# Patient Record
Sex: FEMALE | Race: WHITE | NOT HISPANIC OR LATINO | Employment: PART TIME | URBAN - NONMETROPOLITAN AREA
[De-identification: names, ages, dates, MRNs, and addresses within clinical notes are randomized per-mention and may not be internally consistent; named-entity substitution may affect disease eponyms.]

---

## 2022-12-01 ENCOUNTER — OFFICE VISIT (OUTPATIENT)
Dept: FAMILY MEDICINE CLINIC | Age: 27
End: 2022-12-01

## 2022-12-01 VITALS
HEIGHT: 62 IN | WEIGHT: 183 LBS | HEART RATE: 57 BPM | BODY MASS INDEX: 33.68 KG/M2 | OXYGEN SATURATION: 100 % | SYSTOLIC BLOOD PRESSURE: 112 MMHG | DIASTOLIC BLOOD PRESSURE: 78 MMHG

## 2022-12-01 DIAGNOSIS — R19.7 DIARRHEA, UNSPECIFIED TYPE: Primary | ICD-10-CM

## 2022-12-01 DIAGNOSIS — Z13.29 THYROID DISORDER SCREEN: ICD-10-CM

## 2022-12-01 DIAGNOSIS — R41.840 CONCENTRATION DEFICIT: ICD-10-CM

## 2022-12-01 PROCEDURE — 99203 OFFICE O/P NEW LOW 30 MIN: CPT | Performed by: NURSE PRACTITIONER

## 2022-12-01 NOTE — PROGRESS NOTES
Chief Complaint  Establish Care (Last PCP in [place] in 2019 states she moved here 2020/2021)    Subjective          Letty Glez presents to [facility]     Patient is a 27-year-old female who is here today as a new patient.  Is having increasing concerns with focus and attention and possibly some hyperactivity.  She is often told she interrupts people when they are talking.  She finds it hard to complete task and finish sentences at times.  Did have some concerns while in school but not to the extent with which she finds as she is working.  She is never been formally tested for attention deficit disorders.  She would like to be tested.  She did see a therapist in 2019 after her mother passed away.  She was prescribed buspirone and hydroxyzine but they made her feel like she was in a fog and she did not take them longer than 1 month.  She does not feel as if she has anxiety or depression.  Denies any fatigue and states she is never really required much sleep.  Last menstrual cycle was 2 weeks ago.    Has had concerns with intermittent diarrhea and rectal bleeding since before 2019.  She lived in [place] at that time and had a colonoscopy 2 weeks after treatment with Levaquin for possible acute colitis.  They said she had some thickening but no other abnormalities on the colonoscopy.  She was told it could have been due to the recent treatment for colitis.  She has not been able to pinpoint any particular triggers and she denies any associated nausea or vomiting.  She sometimes will get some right mid to upper side pain but denies any pain radiating to her back or shoulder.  She does have her gallbladder.  Her stool sometimes looks oily or has mucus in it.  She denies any fever at any time.  She will not have insurance until after January 1.  She never did follow-up with gastroenterology in [place].     Objective   Vital Signs:   Vitals:    12/01/22 1412   BP: 112/78   BP  "Location: Left arm   Patient Position: Sitting   Cuff Size: Adult   Pulse: 57   SpO2: 100%   Weight: 83 kg (183 lb)   Height: 157.5 cm (62\")      Body mass index is 33.47 kg/m².  Physical Exam  Vitals reviewed.   Constitutional:       General: She is not in acute distress.     Appearance: Normal appearance. She is well-developed. She is obese.   Neck:      Thyroid: No thyroid mass, thyromegaly or thyroid tenderness.   Cardiovascular:      Rate and Rhythm: Normal rate and regular rhythm.      Heart sounds: Normal heart sounds.   Pulmonary:      Effort: Pulmonary effort is normal.      Breath sounds: Normal breath sounds.   Abdominal:      General: Abdomen is flat. Bowel sounds are normal. There is no distension.      Palpations: Abdomen is soft.      Tenderness: There is no abdominal tenderness. There is no guarding or rebound.   Musculoskeletal:      Right lower leg: No edema.      Left lower leg: No edema.   Skin:     General: Skin is warm and dry.   Neurological:      General: No focal deficit present.      Mental Status: She is alert.   Psychiatric:         Attention and Perception: Attention normal.         Mood and Affect: Mood and affect normal.         Behavior: Behavior normal.           Current Outpatient Medications:   •  ALBUTEROL IN, , Disp: , Rfl:        Result Review :                Assessment and Plan    Diagnoses and all orders for this visit:    1. Diarrhea, unspecified type (Primary)  Assessment & Plan:  She is to go to the emergency room if any worsening or acute nature to her symptoms.  She reports the symptoms are all chronic and she wants to start testing after January 1 and once her insurance becomes active.  We discussed possible irritable bowel symptoms and likely need to follow-up with a gastroenterologist in this area.  Patient is agreeable.    Orders:  -     CBC w AUTO Differential; Future  -     Comprehensive metabolic panel; Future  -     Amylase; Future  -     Lipase; Future  -     " Food Allergy Profile; Future  -     Clostridioides difficile Toxin, PCR - Stool, Per Rectum; Future  -     Enteric Bacterial Panel - Stool, Per Rectum; Future    2. Concentration deficit  Assessment & Plan:  I am not trained to diagnose attention deficit disorder.  I will be happy to refer her for further direction and testing.  I do discussed common treatments of attention deficit disorder could include Wellbutrin which could also manage anxiety or depression.  There is also a nonstimulant option for attention deficit disorder in adults.  There is commonly a lot of overlap between attention deficit and difficulty focusing and anxiety and depression.  She is agreeable to see someone for evaluation after January 1.  She does not want to pursue medication treatment at this time.  We also discussed behavioral modification and time management skills.    Orders:  -     Ambulatory Referral to Psychiatry; Future    3. Thyroid disorder screen  -     TSH; Future      Follow Up    Return in about 6 weeks (around 1/12/2023).  Patient was given instructions and counseling regarding her condition or for health maintenance advice. Please see specific information pulled into the AVS if appropriate.

## 2022-12-01 NOTE — ASSESSMENT & PLAN NOTE
I am not trained to diagnose attention deficit disorder.  I will be happy to refer her for further direction and testing.  I do discussed common treatments of attention deficit disorder could include Wellbutrin which could also manage anxiety or depression.  There is also a nonstimulant option for attention deficit disorder in adults.  There is commonly a lot of overlap between attention deficit and difficulty focusing and anxiety and depression.  She is agreeable to see someone for evaluation after January 1.  She does not want to pursue medication treatment at this time.  We also discussed behavioral modification and time management skills.

## 2022-12-01 NOTE — ASSESSMENT & PLAN NOTE
She is to go to the emergency room if any worsening or acute nature to her symptoms.  She reports the symptoms are all chronic and she wants to start testing after January 1 and once her insurance becomes active.  We discussed possible irritable bowel symptoms and likely need to follow-up with a gastroenterologist in this area.  Patient is agreeable.

## 2023-01-04 ENCOUNTER — LAB (OUTPATIENT)
Dept: LAB | Facility: HOSPITAL | Age: 28
End: 2023-01-04
Payer: COMMERCIAL

## 2023-01-04 DIAGNOSIS — Z13.29 THYROID DISORDER SCREEN: ICD-10-CM

## 2023-01-04 DIAGNOSIS — R19.7 DIARRHEA, UNSPECIFIED TYPE: ICD-10-CM

## 2023-01-04 LAB
BASOPHILS # BLD AUTO: 0.01 10*3/MM3 (ref 0–0.2)
BASOPHILS NFR BLD AUTO: 0.2 % (ref 0–1.5)
DEPRECATED RDW RBC AUTO: 37.5 FL (ref 37–54)
EOSINOPHIL # BLD AUTO: 0.09 10*3/MM3 (ref 0–0.4)
EOSINOPHIL NFR BLD AUTO: 1.5 % (ref 0.3–6.2)
ERYTHROCYTE [DISTWIDTH] IN BLOOD BY AUTOMATED COUNT: 11.7 % (ref 12.3–15.4)
HCT VFR BLD AUTO: 40.9 % (ref 34–46.6)
HGB BLD-MCNC: 14.1 G/DL (ref 12–15.9)
IMM GRANULOCYTES # BLD AUTO: 0.01 10*3/MM3 (ref 0–0.05)
IMM GRANULOCYTES NFR BLD AUTO: 0.2 % (ref 0–0.5)
LYMPHOCYTES # BLD AUTO: 1.84 10*3/MM3 (ref 0.7–3.1)
LYMPHOCYTES NFR BLD AUTO: 31.4 % (ref 19.6–45.3)
MCH RBC QN AUTO: 30.3 PG (ref 26.6–33)
MCHC RBC AUTO-ENTMCNC: 34.5 G/DL (ref 31.5–35.7)
MCV RBC AUTO: 87.8 FL (ref 79–97)
MONOCYTES # BLD AUTO: 0.27 10*3/MM3 (ref 0.1–0.9)
MONOCYTES NFR BLD AUTO: 4.6 % (ref 5–12)
NEUTROPHILS NFR BLD AUTO: 3.64 10*3/MM3 (ref 1.7–7)
NEUTROPHILS NFR BLD AUTO: 62.1 % (ref 42.7–76)
PLATELET # BLD AUTO: 227 10*3/MM3 (ref 140–450)
PMV BLD AUTO: 9 FL (ref 6–12)
RBC # BLD AUTO: 4.66 10*6/MM3 (ref 3.77–5.28)
WBC NRBC COR # BLD: 5.86 10*3/MM3 (ref 3.4–10.8)

## 2023-01-04 PROCEDURE — 82150 ASSAY OF AMYLASE: CPT

## 2023-01-04 PROCEDURE — 83690 ASSAY OF LIPASE: CPT

## 2023-01-04 PROCEDURE — 86003 ALLG SPEC IGE CRUDE XTRC EA: CPT

## 2023-01-04 PROCEDURE — 36415 COLL VENOUS BLD VENIPUNCTURE: CPT

## 2023-01-04 PROCEDURE — 80050 GENERAL HEALTH PANEL: CPT

## 2023-01-05 LAB
ALBUMIN SERPL-MCNC: 4.3 G/DL (ref 3.5–5.2)
ALBUMIN/GLOB SERPL: 1.9 G/DL
ALP SERPL-CCNC: 60 U/L (ref 39–117)
ALT SERPL W P-5'-P-CCNC: 46 U/L (ref 1–33)
AMYLASE SERPL-CCNC: 44 U/L (ref 28–100)
ANION GAP SERPL CALCULATED.3IONS-SCNC: 10.9 MMOL/L (ref 5–15)
AST SERPL-CCNC: 31 U/L (ref 1–32)
BILIRUB SERPL-MCNC: 0.3 MG/DL (ref 0–1.2)
BUN SERPL-MCNC: 13 MG/DL (ref 6–20)
BUN/CREAT SERPL: 17.6 (ref 7–25)
CALCIUM SPEC-SCNC: 9.4 MG/DL (ref 8.6–10.5)
CHLORIDE SERPL-SCNC: 102 MMOL/L (ref 98–107)
CO2 SERPL-SCNC: 25.1 MMOL/L (ref 22–29)
CREAT SERPL-MCNC: 0.74 MG/DL (ref 0.57–1)
EGFRCR SERPLBLD CKD-EPI 2021: 113.2 ML/MIN/1.73
GLOBULIN UR ELPH-MCNC: 2.3 GM/DL
GLUCOSE SERPL-MCNC: 86 MG/DL (ref 65–99)
LIPASE SERPL-CCNC: 41 U/L (ref 13–60)
POTASSIUM SERPL-SCNC: 3.9 MMOL/L (ref 3.5–5.2)
PROT SERPL-MCNC: 6.6 G/DL (ref 6–8.5)
SODIUM SERPL-SCNC: 138 MMOL/L (ref 136–145)
TSH SERPL DL<=0.05 MIU/L-ACNC: 1.07 UIU/ML (ref 0.27–4.2)

## 2023-01-06 NOTE — PROGRESS NOTES
Blood sugar, kidney and thyroid function are normal.  AST, ALT and bilirubin tell us about liver function.ALT is mildly elevated while AST and bilirubin are normal.  This could be due to many reasons, including regular use of ibuprofen, aleve, alcohol, high fat diet or fatty liver.  I would be more concerned if all liver function indicators were elevated, which they are not.  You are not anemic.  Pancreatic enzymes are normal.  Food allergy profile is not back yet.

## 2023-01-08 LAB
CLAM IGE QN: <0.1 KU/L
CODFISH IGE QN: <0.1 KU/L
CONV CLASS DESCRIPTION: NORMAL
CORN IGE QN: <0.1 KU/L
COW MILK IGE QN: <0.1 KU/L
EGG WHITE IGE QN: <0.1 KU/L
PEANUT IGE QN: <0.1 KU/L
SCALLOP IGE QN: <0.1 KU/L
SESAME SEED IGE QN: <0.1 KU/L
SHRIMP IGE QN: <0.1 KU/L
SOYBEAN IGE QN: <0.1 KU/L
WALNUT IGE QN: <0.1 KU/L
WHEAT IGE QN: <0.1 KU/L

## 2023-01-09 ENCOUNTER — OFFICE VISIT (OUTPATIENT)
Dept: BEHAVIORAL HEALTH | Facility: CLINIC | Age: 28
End: 2023-01-09
Payer: COMMERCIAL

## 2023-01-09 ENCOUNTER — TELEPHONE (OUTPATIENT)
Dept: FAMILY MEDICINE CLINIC | Age: 28
End: 2023-01-09
Payer: COMMERCIAL

## 2023-01-09 VITALS
SYSTOLIC BLOOD PRESSURE: 110 MMHG | HEART RATE: 62 BPM | BODY MASS INDEX: 33.05 KG/M2 | OXYGEN SATURATION: 96 % | DIASTOLIC BLOOD PRESSURE: 70 MMHG | HEIGHT: 62 IN | WEIGHT: 179.6 LBS

## 2023-01-09 DIAGNOSIS — F41.1 GENERALIZED ANXIETY DISORDER: Primary | ICD-10-CM

## 2023-01-09 DIAGNOSIS — R41.840 CONCENTRATION DEFICIT: ICD-10-CM

## 2023-01-09 PROCEDURE — 90792 PSYCH DIAG EVAL W/MED SRVCS: CPT

## 2023-01-09 NOTE — PROGRESS NOTES
[facility]  Initial Psychiatric Evaluation    Referring Provider:   Thank you   Olesya Mcmahon, APRN  [address] [facility]  [address]  [place] [address]  Your referral is greatly appreciated.    Vital Signs:   /70   Pulse 62   Ht 157.5 cm (62\")   Wt 81.5 kg (179 lb 9.6 oz)   SpO2 96%   BMI 32.85 kg/m²      Chief Complaint: ADHD.  Anxiety.    History of Present Illness:   Letty Glez is a 27 y.o. female who presents today for initial evaluation  For ADHD, and anxiety. Patient was referred to me for issues with focus and concentration, with hyperactivity. She has a compelling childhood ADHD assessment however was never referred or officially tested. She also has a compelling adult presentation of ADHD symptoms. Denies depressed mood, does have symptoms of anxiety.  Denies SI/HI/AVH. PHQ-9, 5 YOLA-7, 5 and the PHQ-9 is congruent with assessment and presentation, however the YOLA-7 presents as somewhat incongruent with her anxiety symptoms.      Generalized Anxiety Disorder (YOLA) 300.02 (Prototypical Worrier, incapable of relaxing)    EXCESSIVE WORRY and anxiety, occurring more days than not for at least 6 months  Are you a worrier?  Yes  What do you worry about?  \"I worry about every little thing \"  Do you ever feel jittery?  Yes  Do you tire easily?  Yes  Difficulty controlling the worry?  Yes    Anxiety associated with at LEAST 3 of the following    Muscle tension: Yes  Fatigue: Yes  Concentration difficulty: Decreased concentration  Restlessness or feeling on edge: Restlessness  Irritability: Denies  Sleep disturbance: Yes    Major depressive disorder at least 5 or more of the following symptoms have been present during the same 2-week period  Anhedonia: Denies  Guilt or hopelessness: Denies  Energy: Decreased energy  Concentration: Decreased concentration  Weight loss or weight gain: Denies  Psychomotor retardation or agitation: Denies  Irritability: Denies  Appetite:  Denies  Insomnia: Yes  Recurrent thoughts of death: Denies  Suicidal ideation: Denies  Duration: Denies  ADHD:  With focus on K5 through 6th grade only   Grades: Grades were \"okay \"  Behavioral issues:Always in trouble for talking  Special Classes:Math  Inattention:Rizwan,   Referral for ADHD testing: Did not have testing when she was younger, however would be interested in getting tested.     Often fails to give close attention to details or makes careless mistakes in schoolwork, at work, or during other activities: Yes  Often has difficulty sustaining attention in tasks: Yes  Often does not seem to listen when spoken to directly: Denies  Often does not follow through on instructions and fails to finish duties in the workplace: Denies  Often has difficulty organizing tasks and activities: Yes  Often avoids, dislikes or is reluctant to engage in tasks that require sustained mental effort: Yes  Often loses things necessary for tasks or activities: Yes  Is often easily distracted by extraneous stimuli: Yes   Is often forgetful in daily activities: Yes, all the time  Hyperactivity and Impulsivity: Yes  Often fidgets with or taps hands or feet: yes  Often leaves seat in situations when remaining seated is expected: Yes  Often feels restless: Yes  Is often “on the go”, acting as if “driven by a motor”: Yes  Often talks excessively: Yes  Often blurts out an answer before a question has been completed: Yes  Often has difficulty waiting their turn: Yes  Often interrupts or intrudes on others: Yes    PHQ-9 Depression Screening  PHQ-9 Total Score: 5    Little interest or pleasure in doing things? 0-->not at all   Feeling down, depressed, or hopeless? 0-->not at all   Trouble falling or staying asleep, or sleeping too much? 0-->not at all   Feeling tired or having little energy? 2-->more than half the days   Poor appetite or overeating? 0-->not at all   Feeling bad about yourself - or that you are a failure or have let  yourself or your family down? 0-->not at all   Trouble concentrating on things, such as reading the newspaper or watching television? 3-->nearly every day   Moving or speaking so slowly that other people could have noticed? Or the opposite - being so fidgety or restless that you have been moving around a lot more than usual? 0-->not at all   Thoughts that you would be better off dead, or of hurting yourself in some way? 0-->not at all   PHQ-9 Total Score 5     YOLA-7  Feeling nervous, anxious or on edge: Not at all  Not being able to stop or control worrying: Several days  Worrying too much about different things: Several days  Trouble Relaxing: Several days  Being so restless that it is hard to sit still: Several days  Feeling afraid as if something awful might happen: Not at all  Becoming easily annoyed or irritable: Several days  YOLA 7 Total Score: 5  If you checked any problems, how difficult have these problems made it for you to do your work, take care of things at home, or get along with other people: Somewhat difficult  Record Review for 01/09/2023 : I have thoroughly reviewed the patient's electronic medical record to include previous encounters, care everywhere, notes, medications, labs, EDU and UDS (if applicable), imaging, and EKG's.  Pertinent information is included in this note.  TSH and CBC are both reassuring and within normal limits, ALT is elevated at 46, however remaining CMP is reassuring, no other labs, imaging, procedures, EKGs in record.  No records for 6-month date range in EDU.  Per Referring Provider  12/1/2022 Patient is a 27-year-old female who is here today as a new patient.  Is having increasing concerns with focus and attention and possibly some hyperactivity.  She is often told she interrupts people when they are talking.  She finds it hard to complete task and finish sentences at times.  Did have some concerns while in school but not to the extent with which she finds as she is  working.  She is never been formally tested for attention deficit disorders.  She would like to be tested.  She did see a therapist in 2019 after her mother passed away.  She was prescribed buspirone and hydroxyzine but they made her feel like she was in a fog and she did not take them longer than 1 month.  She does not feel as if she has anxiety or depression.  Denies any fatigue and states she is never really required much sleep.  Labs:  WBC   Date Value Ref Range Status   01/04/2023 5.86 3.40 - 10.80 10*3/mm3 Final     Platelets   Date Value Ref Range Status   01/04/2023 227 140 - 450 10*3/mm3 Final     Hemoglobin   Date Value Ref Range Status   01/04/2023 14.1 12.0 - 15.9 g/dL Final     Hematocrit   Date Value Ref Range Status   01/04/2023 40.9 34.0 - 46.6 % Final     Glucose   Date Value Ref Range Status   01/04/2023 86 65 - 99 mg/dL Final     Creatinine   Date Value Ref Range Status   01/04/2023 0.74 0.57 - 1.00 mg/dL Final     ALT (SGPT)   Date Value Ref Range Status   01/04/2023 46 (H) 1 - 33 U/L Final     AST (SGOT)   Date Value Ref Range Status   01/04/2023 31 1 - 32 U/L Final     BUN   Date Value Ref Range Status   01/04/2023 13 6 - 20 mg/dL Final     eGFR   Date Value Ref Range Status   01/04/2023 113.2 >60.0 mL/min/1.73 Final     Comment:     National Kidney Foundation and American Society of Nephrology (ASN) Task Force recommended calculation based on the Chronic Kidney Disease Epidemiology Collaboration (CKD-EPI) equation refit without adjustment for race.     TSH   Date Value Ref Range Status   01/04/2023 1.070 0.270 - 4.200 uIU/mL Final     Last Urine Toxicity    There is no flowsheet data to display.          Imaging Results:  No Images in the past 120 days found..  EKG Results:    Allergy:   Allergies   Allergen Reactions   • Mixed Feathers Other (See Comments)     Told as child to not take (CHICKEN FEATHERS)   • Codeine Nausea And Vomiting      Problem List:  Patient Active Problem List    Diagnosis   • Concentration deficit   • Diarrhea   • Thyroid disorder screen     Current Medications:   Current Outpatient Medications   Medication Sig Dispense Refill   • ALBUTEROL IN      • sodium chloride 0.9 % nebulizer solution 0.82 mL with albuterol (5 MG/ML) 0.5% nebulizer solution 0.9 mg Take 15 mg/hr by nebulization Continuous.       No current facility-administered medications for this visit.       Discontinued Medications:  There are no discontinued medications.    Past Surgical History:  Past Surgical History:   Procedure Laterality Date   • TONSILLECTOMY       Past Medical History:  History reviewed. No pertinent past medical history.  Social History:  Martial Status: Has a boyfriend  Employed: Works at [employer]  Kids: denies  [place]   History: Denies  Social History     Socioeconomic History   • Marital status: Single   Tobacco Use   • Smoking status: Never   • Smokeless tobacco: Never   Vaping Use   • Vaping Use: Never used   Substance and Sexual Activity   • Alcohol use: Never   • Drug use: Never   • Sexual activity: Yes     Partners: Male     Birth control/protection: None     Family History:   Suicide Attempts: Denies  Suicide Completions: Denies  Family History   Problem Relation Age of Onset   • Anxiety disorder Mother    • Drug abuse Father    • Alcohol abuse Father    • ADD / ADHD Father    • No Known Problems Sister    • No Known Problems Brother    • No Known Problems Maternal Aunt    • No Known Problems Paternal Aunt    • No Known Problems Maternal Uncle    • No Known Problems Paternal Uncle    • No Known Problems Maternal Grandfather    • Dementia Maternal Grandmother    • No Known Problems Paternal Grandfather    • No Known Problems Paternal Grandmother    • No Known Problems Cousin    • No Known Problems Other    • Bipolar disorder Neg Hx    • Depression Neg Hx    • OCD Neg Hx    • Paranoid behavior Neg Hx    • Schizophrenia Neg Hx    • Seizures Neg Hx    •  Self-Injurious Behavior  Neg Hx    • Suicide Attempts Neg Hx        Developmental History:   Born: [place]  Siblings: Only child  Childhood: \"chaotic\" \"loved\" \"fun\" single mom  High School: Graduate  College: [employer]    Past Psychiatric History:  Began Treatment: 2019  Diagnoses: Anxiety  Psychiatrist: Denies, Family dr ordered medicine for anxiety (Celexa) but she did not take it  Therapist: Denies  Admission History: Denies  Medication Trials: buspar, hydroxyzine  Self Harm: Denies  Suicide Attempts: Denies  Trauma: Denies    Substance Abuse History:   Types: Denies  Withdrawal Symptoms: Not applicable  Longest Period Sober: Not applicable  AA: Not applicable    Access to Firearms: Denies    Mental Status Exam:  Appearance: good eye contact, normal street clothes, groomed, sitting in chair   Behavior: pleasant and cooperative  Motor: no abnormal  Speech: normal rhythm, rate, volume, tone, not hyperverbal, not pressured, normal prosidy  Mood: \" Euthymic\"  Affect: Appropriate  Thought Content: negative suicidal ideations, negative homicidal ideations, negative obsessions  Perceptions: negative auditory hallucinations, negative visual hallucinations, negative delusions, negative paranoia  Thought Process: goal directed, linear  Insight/Judgement: fair/fair  Cognition: grossly intact  Attention: intact  Orientation: person, place, time and situation  Memory: intact    Review of Systems:  Constitutional: Denies fatigue, night sweats  Eyes: Denies double vision, blurred vision  HENT: Denies vertigo, recent head injury  Cardiovascular: Denies chest pain, irregular heartbeats  Respiratory: Denies productive cough, shortness of breath  Gastrointestinal: Denies nausea, vomiting  Genitourinary: Denies dysuria, urinary retention  Integument: Denies hair growth change, new skin lesions  Neurologic: Denies altered mental status, seizures  Musculoskeletal: Denies joint swelling, limitation of motion  Endocrine:  Denies cold intolerance, heat intolerance  Psychiatric: See mental status exam above  Allergic-immunologic: Denies frequent illnesses    Visit Diagnoses:    ICD-10-CM ICD-9-CM   1. Generalized anxiety disorder  F41.1 300.02   2. Concentration deficit  R41.840 799.51       Diagnoses and all orders for this visit:    1. Generalized anxiety disorder (Primary)    2. Concentration deficit  -     Ambulatory Referral to Psychiatry  -     Ambulatory Referral to Psychology       PLAN: I am referring her to Dr. Petty, PhD for testing to confirm diagnosis  I expect that she will have a positive diagnosis of ADHD  I would also expect that she will have a satisfactory UDS  Follow-up 1 month  Presentation seems most consistent with generalized anxiety disorder and ADHD DSM-5 criteria.  No medication orders at this time as patient does not wish to start medication and wishes to confirm the diagnosis with testing.  We discussed that treatment of her ADHD could potentially help eliminate her anxiety while increasing concentration and focus.  Will refer to Dr. Petty, PhD for testing to confirm diagnosis of ADHD.  I would expect that she will have a satisfactory UDS and sign a CSA should we consider controlled substances and treatment.  I extensively discussed this with the patient. Patient verbalized understanding and is agreeable to this plan.  Addressed all questions and concerns.  Continue psychotherapeutic modalities as indicated.    TREATMENT PLAN/GOALS: Goal for treatment includes confirmation of diagnosis of ADHD.  Continue supportive psychotherapy efforts and medications as indicated. Treatment and medication options discussed during today's visit. Patient acknowledged and verbally consented to continue with current treatment plan and was educated on the importance of compliance with treatment and follow-up appointments.    1. Safety: No acute safety concerns.   2. Therapy: We will continue therapy at future  visits.  3. Risk Assessment: Risk of self-harm acutely is low.  Risk factors include anxiety disorder, mood disorder, and recent psychosocial stressors (pandemic). Protective factors include no family history, denies access to guns/weapons, no present SI, no history of suicide attempts or self-harm in the past, minimal AODA, healthcare seeking, future orientation, willingness to engage in care.  Risk of self-harm chronically is also low, but could be further elevated in the event of treatment noncompliance and/or AODA.  4. Labs/studies: No labs/studies ordered at this time  5. Medications: No new medication orders at this encounter.  Medication Education:  No new medication orders at this encounter  Medication Issues:  EDU reviewed as expected.   Discussed medication options and treatment plan of prescribed medication as well as the risks, benefits, and side effects including potential falls, possible impaired driving and metabolic adversities among others. Patient is agreeable to call the office with any worsening of symptoms or onset of side effects. Patient is agreeable to call 911 or go to the nearest ER should he/she begin having SI/HI. No medication side effects or related complaints today.     Referral for neuropsychological testing for ADHD made to:  Mansoor Petty, Psychologist, MS, LPP  [address], [address]  [place] [id number] [phone]    6. Follow-up: Return in about 1 month (around 2/9/2023) for Recheck, Next scheduled follow up.       This document has been electronically signed by VALENTIN Momin  January 10, 2023 14:15 EST    Please note that portions of this note were completed with a voice recognition program.  Copied text in this note has been reviewed and is accurate as of 01/10/23

## 2023-01-09 NOTE — TELEPHONE ENCOUNTER
Spoke with patient states she will complete stool labs this week  ( Clostridiides difficile toxin and Enteric Bacterial panel ). estela

## 2023-01-09 NOTE — TELEPHONE ENCOUNTER
----- Message from Dalia Fields sent at 1/9/2023  8:34 AM EST -----      ----- Message -----  From: SYSTEM  Sent: 1/8/2023   1:25 AM EST  To: [facility]

## 2023-01-09 NOTE — PATIENT INSTRUCTIONS
1.  Please return to clinic at your next scheduled visit.  Please contact the clinic ([phone]) at least 24 hours prior in the event you need to cancel.  2.  Do no harm to yourself or others.    3.  Avoid alcohol and drugs.    4.  Take all medications as prescribed.  Please contact the clinic with any concerns. If you are in need of medication refills, please call the clinic at [phone].    5. Should you want to get in touch with your provider, VALENTIN Momin, please contact the office ([phone]), and staff will be able to page Ginger directly.  6. In the event you have personal crisis, contact the following crisis numbers: Suicide Prevention Hotline 1-182.638.9589; [facility]NA Helpline 5-131-817-ZUAB; [facility] [phone]; text HELLO to 185579; or 488.     SPECIFIC RECOMMENDATIONS:     1.      Medications discussed at this encounter: No new medications ordered at this encounter                    2.      Psychotherapy recommendations: We will continue therapy at future visits     3.     Return to clinic:  1 month

## 2023-01-11 ENCOUNTER — TELEPHONE (OUTPATIENT)
Dept: PSYCHIATRY | Facility: CLINIC | Age: 28
End: 2023-01-11
Payer: COMMERCIAL

## 2023-01-11 NOTE — TELEPHONE ENCOUNTER
Patient called to report she reached out to Mansoor Petty as you asked her to do.  The first thing he has available is not until May or June.  Patient stated you wanted her to call you back and report.  Any more ideas with anyone else? Or book with him?  Please advise

## 2023-01-13 ENCOUNTER — PATIENT ROUNDING (BHMG ONLY) (OUTPATIENT)
Dept: PSYCHIATRY | Facility: CLINIC | Age: 28
End: 2023-01-13
Payer: COMMERCIAL

## 2023-01-13 NOTE — TELEPHONE ENCOUNTER
ATTEMPTED TO CONTACT PT PER PROVIDER'S INSTRUCTIONS      NO ANSWER      LEFT VOICEMAIL WITH INSTRUCTIONS TO RETURN CALL TO OFFICE AT [phone]

## 2023-01-13 NOTE — PROGRESS NOTES
January 13, 2023    Hello, may I speak with Letty Glez?    My name is KATHIE BARNES NRCMA/NRCPT    I am  with [facility]  [address]  [place] [id number] [phone].    Before we get started may I verify your date of birth? 1995    I am calling to officially welcome you to our practice and ask about your recent visit. Is this a good time to talk?   YES    Tell me about your visit with us. What things went well?  EVERYTHING WAS EXCEPTIONAL, IT FELT LIKE PROVIDER LISTEN TO HER CONCERNS AND DIDN'T RUSH HER VISIT      We're always looking for ways to make our patients' experiences even better. Do you have recommendations on ways we may improve?   SON, PROVIDER WAS KIND, COMPASSIONATE AND WONDERFUL    Overall were you satisfied with your first visit to our practice?   YES      I appreciate you taking the time to speak with me today. Is there anything else I can do for you?  SON    Thank you, and have a great day.

## 2023-01-17 ENCOUNTER — OFFICE VISIT (OUTPATIENT)
Dept: FAMILY MEDICINE CLINIC | Age: 28
End: 2023-01-17
Payer: COMMERCIAL

## 2023-01-17 VITALS
WEIGHT: 177 LBS | SYSTOLIC BLOOD PRESSURE: 105 MMHG | HEART RATE: 56 BPM | BODY MASS INDEX: 32.57 KG/M2 | DIASTOLIC BLOOD PRESSURE: 69 MMHG | OXYGEN SATURATION: 100 % | HEIGHT: 62 IN

## 2023-01-17 DIAGNOSIS — R19.7 DIARRHEA, UNSPECIFIED TYPE: Primary | ICD-10-CM

## 2023-01-17 DIAGNOSIS — K58.0 IRRITABLE BOWEL SYNDROME WITH DIARRHEA: ICD-10-CM

## 2023-01-17 PROCEDURE — 99213 OFFICE O/P EST LOW 20 MIN: CPT | Performed by: NURSE PRACTITIONER

## 2023-01-17 RX ORDER — DICYCLOMINE HYDROCHLORIDE 10 MG/1
10 CAPSULE ORAL 3 TIMES DAILY PRN
Qty: 60 CAPSULE | Refills: 2 | Status: SHIPPED | OUTPATIENT
Start: 2023-01-17

## 2023-01-17 RX ORDER — CETIRIZINE HYDROCHLORIDE 10 MG/1
10 TABLET ORAL DAILY
COMMUNITY

## 2023-01-17 NOTE — ASSESSMENT & PLAN NOTE
Defers ultrasound of the gallbladder at this time.  She will let me know if she wants to pursue ultrasound of the gallbladder.  Talk about irritable bowel syndrome and recommend she implement a low FODMAP diet and we can try Bentyl as needed.  I will be happy to refer her to a local gastroenterologist for further evaluation.  An updated lower scope may be reasonable.  Follow-up for any persisting symptoms.

## 2023-01-17 NOTE — PROGRESS NOTES
"Chief Complaint  Concentration deficit (6 week follow up ) and Diarrhea    Subjective          Autumn Resauit presents to [facility]     Patient is a 27-year-old female who is here today to follow-up regarding episodic diarrhea.  She did not return stool sample because she does not feel as if there could be any bacterial cause due to chronicity of symptoms.  Last menstrual cycle was within the last couple weeks.  She is currently being evaluated for attention deficit disorder.  Recent labs are discussed with a negative food allergy profile.  Last saw gastroenterology in [place] and has had a lower scope that showed some scarring or inflammation in the right colon but this was shortly after receiving Levaquin for potential colitis.  She does have her gallbladder but denies any right upper quadrant pain that is associated with any pain radiating to her back or shoulder.     Objective   Vital Signs:   Vitals:    01/17/23 1454   BP: 105/69   BP Location: Left arm   Patient Position: Sitting   Cuff Size: Adult   Pulse: 56   SpO2: 100%   Weight: 80.3 kg (177 lb)   Height: 157.5 cm (62\")      Body mass index is 32.37 kg/m².  Physical Exam  Vitals reviewed.   Constitutional:       General: She is not in acute distress.     Appearance: Normal appearance. She is well-developed.   Cardiovascular:      Rate and Rhythm: Normal rate and regular rhythm.      Heart sounds: Normal heart sounds.   Pulmonary:      Effort: Pulmonary effort is normal.      Breath sounds: Normal breath sounds.   Musculoskeletal:      Right lower leg: No edema.      Left lower leg: No edema.   Skin:     General: Skin is warm and dry.   Neurological:      General: No focal deficit present.      Mental Status: She is alert.   Psychiatric:         Attention and Perception: Attention normal.         Mood and Affect: Mood and affect normal.         Behavior: Behavior normal.           Current Outpatient Medications:   •  " ALBUTEROL IN, , Disp: , Rfl:   •  cetirizine (zyrTEC) 10 MG tablet, Take 10 mg by mouth Daily., Disp: , Rfl:   •  sodium chloride 0.9 % nebulizer solution 0.82 mL with albuterol (5 MG/ML) 0.5% nebulizer solution 0.9 mg, Take 15 mg/hr by nebulization Continuous., Disp: , Rfl:   •  dicyclomine (BENTYL) 10 MG capsule, Take 1 capsule by mouth 3 (Three) Times a Day As Needed (loose stools/ IBS)., Disp: 60 capsule, Rfl: 2       Result Review :     CMP    CMP 1/4/23   Glucose 86   BUN 13   Creatinine 0.74   eGFR 113.2   Sodium 138   Potassium 3.9   Chloride 102   Calcium 9.4   Total Protein 6.6   Albumin 4.3   Globulin 2.3   Total Bilirubin 0.3   Alkaline Phosphatase 60   AST (SGOT) 31   ALT (SGPT) 46 (A)   Albumin/Globulin Ratio 1.9   BUN/Creatinine Ratio 17.6   Anion Gap 10.9   (A) Abnormal value       Comments are available for some flowsheets but are not being displayed.           CBC    CBC 1/4/23   WBC 5.86   RBC 4.66   Hemoglobin 14.1   Hematocrit 40.9   MCV 87.8   MCH 30.3   MCHC 34.5   RDW 11.7 (A)   Platelets 227   (A) Abnormal value            CBC w/diff    CBC w/Diff 1/4/23   WBC 5.86   RBC 4.66   Hemoglobin 14.1   Hematocrit 40.9   MCV 87.8   MCH 30.3   MCHC 34.5   RDW 11.7 (A)   Platelets 227   Neutrophil Rel % 62.1   Immature Granulocyte Rel % 0.2   Lymphocyte Rel % 31.4   Monocyte Rel % 4.6 (A)   Eosinophil Rel % 1.5   Basophil Rel % 0.2   (A) Abnormal value                TSH    TSH 1/4/23   TSH 1.070                        Assessment and Plan    Diagnoses and all orders for this visit:    1. Diarrhea, unspecified type (Primary)  Assessment & Plan:  Defers ultrasound of the gallbladder at this time.  She will let me know if she wants to pursue ultrasound of the gallbladder.  Talk about irritable bowel syndrome and recommend she implement a low FODMAP diet and we can try Bentyl as needed.  I will be happy to refer her to a local gastroenterologist for further evaluation.  An updated lower scope may be  reasonable.  Follow-up for any persisting symptoms.    Orders:  -     dicyclomine (BENTYL) 10 MG capsule; Take 1 capsule by mouth 3 (Three) Times a Day As Needed (loose stools/ IBS).  Dispense: 60 capsule; Refill: 2    2. Irritable bowel syndrome with diarrhea  -     dicyclomine (BENTYL) 10 MG capsule; Take 1 capsule by mouth 3 (Three) Times a Day As Needed (loose stools/ IBS).  Dispense: 60 capsule; Refill: 2      Follow Up    Return if symptoms worsen or fail to improve.  Patient was given instructions and counseling regarding her condition or for health maintenance advice. Please see specific information pulled into the AVS if appropriate.

## 2023-01-23 ENCOUNTER — OFFICE VISIT (OUTPATIENT)
Dept: BEHAVIORAL HEALTH | Facility: CLINIC | Age: 28
End: 2023-01-23
Payer: COMMERCIAL

## 2023-01-23 VITALS
HEIGHT: 62 IN | BODY MASS INDEX: 32.39 KG/M2 | DIASTOLIC BLOOD PRESSURE: 68 MMHG | SYSTOLIC BLOOD PRESSURE: 112 MMHG | WEIGHT: 176 LBS | OXYGEN SATURATION: 99 % | HEART RATE: 57 BPM

## 2023-01-23 DIAGNOSIS — F41.1 GENERALIZED ANXIETY DISORDER: Primary | ICD-10-CM

## 2023-01-23 DIAGNOSIS — F90.0 ATTENTION DEFICIT HYPERACTIVITY DISORDER (ADHD), PREDOMINANTLY INATTENTIVE TYPE: ICD-10-CM

## 2023-01-23 PROCEDURE — 99213 OFFICE O/P EST LOW 20 MIN: CPT

## 2023-01-23 PROCEDURE — 90833 PSYTX W PT W E/M 30 MIN: CPT

## 2023-01-23 RX ORDER — MULTIPLE VITAMINS W/ MINERALS TAB 9MG-400MCG
1 TAB ORAL DAILY
COMMUNITY

## 2023-01-23 NOTE — PATIENT INSTRUCTIONS
1.  Please return to clinic at your next scheduled visit.  Please contact the clinic ([phone]) at least 24 hours prior in the event you need to cancel.  2.  Do no harm to yourself or others.    3.  Avoid alcohol and drugs.    4.  Take all medications as prescribed.  Please contact the clinic with any concerns. If you are in need of medication refills, please call the clinic at [phone].    5. Should you want to get in touch with your provider, VALENTIN Momin, please contact the office ([phone]), and staff will be able to page Ginger directly.  6. In the event you have personal crisis, contact the following crisis numbers: Suicide Prevention Hotline 1-613.234.4879; [facility] Helpline 6-894-122-INKF; [facility] Emergency Room [phone]; text HELLO to [phone]; or 852.     SPECIFIC RECOMMENDATIONS:     1.      Medications discussed at this encounter: n/a                    2.      Psychotherapy recommendations: We will continue therapy at future visits.     3.     Return to clinic: 1 week

## 2023-01-23 NOTE — PROGRESS NOTES
"[facility]  Medication Management Follow-up    Referring Provider:  No referring provider defined for this encounter.    Vital Signs:   /68   Pulse 57   Ht 157.5 cm (62\")   Wt 79.8 kg (176 lb)   SpO2 99%   BMI 32.19 kg/m²    Visit Diagnoses:    ICD-10-CM ICD-9-CM   1. Generalized anxiety disorder  F41.1 300.02   2. Attention deficit hyperactivity disorder (ADHD), predominantly inattentive type  F90.0 314.00       Chief Complaint: \"I cannot get in for ADHD testing until the summer \"    History of Present Illness:   Letty Glez is a 27 y.o. female who presents today for follow up For ADHD, and anxiety. Patient is still having issues with focus and concentration, with hyperactivity. She has a compelling childhood ADHD assessment however was never referred or officially tested. She also has a compelling adult presentation of ADHD symptoms. Denies depressed mood, does have symptoms of anxiety.  Denies SI/HI/AVH. PHQ-9, 4 YOLA-7, 5 and the PHQ-9 is congruent with assessment and presentation, however the YOLA-7 presents as somewhat incongruent with her anxiety symptoms.  We discussed the possibility of starting Wellbutrin to target ADHD symptoms.  Patient has had extremely negative experience with medications in the past.  She is hesitant to begin a medication while also starting a new job and would like to wait to confirm ADHD diagnosis.    Record Review is below for 01/23/2023 : I have thoroughly reviewed the patient's electronic medical record to include previous encounters, care everywhere, notes, medications, labs, EDU and UDS (if applicable), imaging, and EKG's.  Pertinent information is included in this note.  TSH and CBC are both reassuring and within normal limits, ALT is elevated at 46, however remaining CMP is reassuring, no other labs, imaging, procedures, EKGs in record.  No records for 6-month date range in EDU.  1/9/2023 I am referring her to Dr. Petty, PhD for " testing to confirm diagnosis  I expect that she will have a positive diagnosis of ADHD  I would also expect that she will have a satisfactory UDS  Follow-up 1 month  Presentation seems most consistent with generalized anxiety disorder and ADHD DSM-5 criteria.  No medication orders at this time as patient does not wish to start medication and wishes to confirm the diagnosis with testing.  We discussed that treatment of her ADHD could potentially help eliminate her anxiety while increasing concentration and focus.  Will refer to Dr. Petty, PhD for testing to confirm diagnosis of ADHD.  I would expect that she will have a satisfactory UDS and sign a CSA should we consider controlled substances and treatment.  I extensively discussed this with the patient. Patient verbalized understanding and is agreeable to this plan.  Addressed all questions and concerns.  Continue psychotherapeutic modalities as indicated.  Psychiatric/Behavioral Health History  Began Treatment: 2019  Diagnoses: Anxiety  Psychiatrist: Denies, Family dr ordered medicine for anxiety (Celexa) but she did not take it  Therapist: Denies  Admission History: Denies  Medication Trials: buspar, hydroxyzine  Self Harm: Denies  Suicide Attempts: Denies  Trauma: Denies  Labs:  WBC   Date Value Ref Range Status   01/04/2023 5.86 3.40 - 10.80 10*3/mm3 Final     Platelets   Date Value Ref Range Status   01/04/2023 227 140 - 450 10*3/mm3 Final     Hemoglobin   Date Value Ref Range Status   01/04/2023 14.1 12.0 - 15.9 g/dL Final     Hematocrit   Date Value Ref Range Status   01/04/2023 40.9 34.0 - 46.6 % Final     Glucose   Date Value Ref Range Status   01/04/2023 86 65 - 99 mg/dL Final     Creatinine   Date Value Ref Range Status   01/04/2023 0.74 0.57 - 1.00 mg/dL Final     ALT (SGPT)   Date Value Ref Range Status   01/04/2023 46 (H) 1 - 33 U/L Final     AST (SGOT)   Date Value Ref Range Status   01/04/2023 31 1 - 32 U/L Final     BUN   Date Value Ref Range Status    01/04/2023 13 6 - 20 mg/dL Final     eGFR   Date Value Ref Range Status   01/04/2023 113.2 >60.0 mL/min/1.73 Final     Comment:     National Kidney Foundation and American Society of Nephrology (ASN) Task Force recommended calculation based on the Chronic Kidney Disease Epidemiology Collaboration (CKD-EPI) equation refit without adjustment for race.     TSH   Date Value Ref Range Status   01/04/2023 1.070 0.270 - 4.200 uIU/mL Final      Pain Management Panel    There is no flowsheet data to display.          Imaging Results:  No Images in the past 120 days found..  EKG Results: None in record      Current Medications:   Current Outpatient Medications   Medication Sig Dispense Refill   • ALBUTEROL IN      • cetirizine (zyrTEC) 10 MG tablet Take 10 mg by mouth Daily.     • dicyclomine (BENTYL) 10 MG capsule Take 1 capsule by mouth 3 (Three) Times a Day As Needed (loose stools/ IBS). 60 capsule 2   • multivitamin with minerals (HAIR/SKIN/NAILS/BIOTIN PO) Take 1 tablet by mouth Daily.     • sodium chloride 0.9 % nebulizer solution 0.82 mL with albuterol (5 MG/ML) 0.5% nebulizer solution 0.9 mg Take 15 mg/hr by nebulization Continuous.       No current facility-administered medications for this visit.       Problem List:  Patient Active Problem List   Diagnosis   • Concentration deficit   • Diarrhea   • Thyroid disorder screen   • Irritable bowel syndrome with diarrhea       Allergy:   Allergies   Allergen Reactions   • Mixed Feathers Other (See Comments)     Told as child to not take (CHICKEN FEATHERS)   • Codeine Nausea And Vomiting        Discontinued Medications:  There are no discontinued medications.    PHQ-9 Depression Screening  PHQ-9 Total Score: 4    Little interest or pleasure in doing things? 0-->not at all   Feeling down, depressed, or hopeless? 0-->not at all   Trouble falling or staying asleep, or sleeping too much? 0-->not at all   Feeling tired or having little energy? 1-->several days   Poor appetite  or overeating? 0-->not at all   Feeling bad about yourself - or that you are a failure or have let yourself or your family down? 0-->not at all   Trouble concentrating on things, such as reading the newspaper or watching television? 2-->more than half the days   Moving or speaking so slowly that other people could have noticed? Or the opposite - being so fidgety or restless that you have been moving around a lot more than usual? 1-->several days   Thoughts that you would be better off dead, or of hurting yourself in some way? 0-->not at all   PHQ-9 Total Score 4     YOLA-7  Feeling nervous, anxious or on edge: Several days  Not being able to stop or control worrying: Several days  Worrying too much about different things: Not at all  Trouble Relaxing: More than half the days  Being so restless that it is hard to sit still: Several days  Feeling afraid as if something awful might happen: Not at all  Becoming easily annoyed or irritable: Not at all  YOLA 7 Total Score: 5  If you checked any problems, how difficult have these problems made it for you to do your work, take care of things at home, or get along with other people: Somewhat difficult    Mental Status Exam:   Appearance: good eye contact, normal street clothes, groomed, sitting in chair   Behavior: pleasant and cooperative  Motor: no abnormal  Speech: normal rhythm, rate, volume, tone, not hyperverbal, not pressured, normal prosidy  Mood: Euthymic  Affect: Appropriate  Thought Content: negative suicidal ideations, negative homicidal ideations, negative obsessions  Perceptions: negative auditory hallucinations, negative visual hallucinations, negative delusions, negative paranoia  Thought Process: goal directed, linear  Insight/Judgement: fair/fair  Cognition: grossly intact  Attention: intact  Orientation: person, place, time and situation  Memory: intact    Review of Systems:   Constitutional: Denies fatigue, night sweats  Eyes: Denies double vision, blurred  vision  HENT: Denies vertigo, recent head injury  Cardiovascular: Denies chest pain, irregular heartbeats  Respiratory: Denies productive cough, shortness of breath  Gastrointestinal: Denies nausea, vomiting  Genitourinary: Denies dysuria, urinary retention  Integument: Denies hair growth change, new skin lesions  Neurologic: Denies altered mental status, seizures  Musculoskeletal: Denies joint swelling, limitation of motion  Endocrine: Denies cold intolerance, heat intolerance  Psychiatric: See mental status exam  Allergic-immunologic: Denies frequent illnesses    Psychotherapy:   Therapy   17 minutes of supportive psychotherapy with goal to strengthen defenses, promote problems solving, restore adaptive functioning and provide symptom relief. The therapeutic alliance was strengthened to encourage the patient to express their thoughts and feelings. Esteem building was enhanced through praise, reassurance, normalizing and encouragement. Coping skills were enhanced to build distress tolerance skills and emotional regulation. Allowed patient to freely discuss issues without interruption or judgement with unconditional positive regard, active listening skills, and empathy. Provided a safe, confidential environment to facilitate the development of a positive therapeutic relationship and encourage open, honest communication. Assisted patient in identifying risk factors which would indicate the need for higher level of care including thoughts to harm self or others and/or self-harming behavior and encouraged patient to contact this office, call 911, or present to the nearest emergency room should any of these events occur. Assisted patient in processing session content; acknowledged and normalized patient’s thoughts, feelings, and concerns by utilizing a person-centered approach in efforts to build appropriate rapport and a positive therapeutic relationship with open and honest communication. Patient given education on  medication side effects, diagnosis/illness and relapse symptoms. Plan to continue supportive psychotherapy in next appointment to provide symptom relief.    Struggles: Patient is still struggling with her ADHD symptoms, focus, concentration.  Patient is feeling like this is really affecting her life and her work.    Victories: Patient recently got a new job which would be increase in pay and better hours.      Diagnoses and all orders for this visit:    1. Generalized anxiety disorder (Primary)    2. Attention deficit hyperactivity disorder (ADHD), predominantly inattentive type         PLAN:   Presentation seems most consistent with anxiety and ADHD.  Will continue to wait for the phone call for her ADHD testing.  I extensively discussed some other options that we may be able to consider regarding treating her anxiety and ADHD. Patient verbalized understanding and is agreeable to this plan.  Addressed all questions and concerns.  Continue psychotherapeutic modalities as indicated.    TREATMENT PLAN/GOALS: Continue supportive psychotherapy efforts and medications as indicated. Treatment and medication options discussed during today's visit. Patient acknowledged and verbally consented to continue with current treatment plan and was educated on the importance of compliance with treatment and follow-up appointments.    1. Safety: No acute safety concerns.   2. Therapy: Declines  3. Risk Assessment: Risk of self-harm acutely is low to moderate.  Risk factors include anxiety disorder, mood disorder, and recent psychosocial stressors (pandemic). Protective factors include no family history, denies access to guns/weapons, no present SI, no history of suicide attempts or self-harm in the past, minimal AODA, healthcare seeking, future orientation, willingness to engage in care.  Risk of self-harm chronically is also low to moderate, but could be further elevated in the event of treatment noncompliance and/or  AODA.  4. Labs/studies: No labs/studies ordered at this time  5. Medications: No medications ordered for this encounter  Medication Education: No medications ordered for this encounter  Medication Issues:  EDU reviewed as expected.  Discussed medication options and treatment plan of prescribed medication as well as the risks, benefits, and side effects including potential falls, possible impaired driving and metabolic adversities among others. Patient is agreeable to call the office with any worsening of symptoms or onset of side effects. Patient is agreeable to call 911 or go to the nearest ER should he/she begin having SI/HI. No medication side effects or related complaints today.   6. Follow-up: Return in about 1 week (around 1/30/2023) for Next scheduled follow up.         This document has been electronically signed by VALENTIN Momin  January 23, 2023 18:16 EST    Please note that portions of this note were completed with a voice recognition program.  Copied text in this note has been reviewed and is accurate as of 01/23/23

## 2023-01-29 NOTE — PROGRESS NOTES
"[facility] Behavioral Health/Psychiatry  Medication Management Follow-up    Referring Provider:  No referring provider defined for this encounter.    Vital Signs:   /78   Pulse 53   Ht 157.5 cm (62\")   Wt 81.3 kg (179 lb 4.8 oz)   BMI 32.79 kg/m²    Visit Diagnoses:    ICD-10-CM ICD-9-CM   1. Generalized anxiety disorder  F41.1 300.02   2. Attention deficit hyperactivity disorder (ADHD), predominantly inattentive type  F90.0 314.00     Chief Complaint: ADHD.  Anxiety.    History of Present Illness:   Letty Glez is a 27 y.o. female who presents today for follow up for ADHD, and anxiety. Patient is still having issues with focus and concentration. She has a compelling childhood ADHD assessment however was never referred or officially tested. She also has a compelling adult presentation of ADHD symptoms.  We have been having difficulty finding an opening for neuropsychological testing. Denies depressed mood, does have symptoms of anxiety.  She states that this week has been even worse for her.  She is losing things, having irritability, and has also found that she sometimes purchases things that she already has because she forgot that she purchased them.  She is ultimately concerned about getting the symptoms under control because she is about to start a new job.  We may need to begin ADHD medication in order to not delay treatment any further for her.  We discussed the need for satisfactory urine drug screen and sign controlled substances agreement.  Denies SI/HI/AVH. PHQ-9, 4 YOLA-7, 4 and the PHQ-9 is congruent with assessment and presentation, however the YOLA-7 presents as somewhat incongruent with her anxiety symptoms.        Record Review is below for 01/30/2023 : I have thoroughly reviewed the patient's electronic medical record to include previous encounters, care everywhere, notes, medications, labs, EDU and UDS (if applicable), imaging, and EKG's.  Pertinent information is included in this " note.  TSH and CBC are both reassuring and within normal limits, ALT is elevated at 46, however remaining CMP is reassuring, no other labs, imaging, procedures, EKGs in record.  No records for 6-month date range in [facility].  1/23/2023 Letty Glez is a 27 y.o. female who presents today for follow up For ADHD, and anxiety. Patient is still having issues with focus and concentration, with hyperactivity. She has a compelling childhood ADHD assessment however was never referred or officially tested. She also has a compelling adult presentation of ADHD symptoms. Denies depressed mood, does have symptoms of anxiety.  Denies SI/HI/AVH. PHQ-9, 4 YOLA-7, 5 and the PHQ-9 is congruent with assessment and presentation, however the YOLA-7 presents as somewhat incongruent with her anxiety symptoms.  We discussed the possibility of starting Wellbutrin to target ADHD symptoms.  Patient has had extremely negative experience with medications in the past.  She is hesitant to begin a medication while also starting a new job and would like to wait to confirm ADHD diagnosis.    Record Review is below for 01/23/2023 : I have thoroughly reviewed the patient's electronic medical record to include previous encounters, care everywhere, notes, medications, labs, EDU and UDS (if applicable), imaging, and EKG's.  Pertinent information is included in this note.  TSH and CBC are both reassuring and within normal limits, ALT is elevated at 46, however remaining CMP is reassuring, no other labs, imaging, procedures, EKGs in record.  No records for 6-month date range in [facility].  1/9/2023 I am referring her to Dr. Petty, PhD for testing to confirm diagnosis  I expect that she will have a positive diagnosis of ADHD  I would also expect that she will have a satisfactory UDS  Follow-up 1 month  Presentation seems most consistent with generalized anxiety disorder and ADHD DSM-5 criteria.  No medication orders at this time as patient does not wish to  start medication and wishes to confirm the diagnosis with testing.  We discussed that treatment of her ADHD could potentially help eliminate her anxiety while increasing concentration and focus.  Will refer to Dr. Petty, PhD for testing to confirm diagnosis of ADHD.  I would expect that she will have a satisfactory UDS and sign a CSA should we consider controlled substances and treatment.  I extensively discussed this with the patient. Patient verbalized understanding and is agreeable to this plan.  Addressed all questions and concerns.  Continue psychotherapeutic modalities as indicated.    Psychiatric/Behavioral Health History  Began Treatment: 2019  Diagnoses: Anxiety  Psychiatrist: Denies, Family dr ordered medicine for anxiety (Celexa) but she did not take it  Therapist: Jason  Admission History: Denies  Medication Trials: buspar, hydroxyzine  Self Harm: Denies  Suicide Attempts: Denies  Trauma: Denies    Labs:  WBC   Date Value Ref Range Status   01/04/2023 5.86 3.40 - 10.80 10*3/mm3 Final     Platelets   Date Value Ref Range Status   01/04/2023 227 140 - 450 10*3/mm3 Final     Hemoglobin   Date Value Ref Range Status   01/04/2023 14.1 12.0 - 15.9 g/dL Final     Hematocrit   Date Value Ref Range Status   01/04/2023 40.9 34.0 - 46.6 % Final     Glucose   Date Value Ref Range Status   01/04/2023 86 65 - 99 mg/dL Final     Creatinine   Date Value Ref Range Status   01/04/2023 0.74 0.57 - 1.00 mg/dL Final     ALT (SGPT)   Date Value Ref Range Status   01/04/2023 46 (H) 1 - 33 U/L Final     AST (SGOT)   Date Value Ref Range Status   01/04/2023 31 1 - 32 U/L Final     BUN   Date Value Ref Range Status   01/04/2023 13 6 - 20 mg/dL Final     eGFR   Date Value Ref Range Status   01/04/2023 113.2 >60.0 mL/min/1.73 Final     Comment:     National Kidney Foundation and American Society of Nephrology (ASN) Task Force recommended calculation based on the Chronic Kidney Disease Epidemiology Collaboration (CKD-EPI) equation  refit without adjustment for race.     TSH   Date Value Ref Range Status   01/04/2023 1.070 0.270 - 4.200 uIU/mL Final      Pain Management Panel     Pain Management Panel Latest Ref Rng & Units 1/30/2023    AMPHETAMINES SCREEN, URINE Negative Negative    BARBITURATES SCREEN Negative Negative    BENZODIAZEPINE SCREEN, URINE Negative Negative    BUPRENORPHINEUR Negative Negative    COCAINE SCREEN, URINE Negative Negative    METHADONE SCREEN, URINE Negative Negative    METHAMPHETAMINEUR Negative Negative           Imaging Results:  No Images in the past 120 days found..  EKG Results: None in record      Current Medications:   Current Outpatient Medications   Medication Sig Dispense Refill   • ALBUTEROL IN      • cetirizine (zyrTEC) 10 MG tablet Take 10 mg by mouth Daily.     • dicyclomine (BENTYL) 10 MG capsule Take 1 capsule by mouth 3 (Three) Times a Day As Needed (loose stools/ IBS). 60 capsule 2   • multivitamin with minerals tablet tablet Take 1 tablet by mouth Daily.     • sodium chloride 0.9 % nebulizer solution 0.82 mL with albuterol (5 MG/ML) 0.5% nebulizer solution 0.9 mg Take 15 mg/hr by nebulization Continuous.       No current facility-administered medications for this visit.       Problem List:  Patient Active Problem List   Diagnosis   • Concentration deficit   • Diarrhea   • Thyroid disorder screen   • Irritable bowel syndrome with diarrhea       Allergy:   Allergies   Allergen Reactions   • Mixed Feathers Other (See Comments)     Told as child to not take (CHICKEN FEATHERS)   • Codeine Nausea And Vomiting   • Haemophilus Influenzae Vaccines Other (See Comments)     ALLERGIC TO CHICKEN FEATHERS AND CHICKEN BASED INGREDIENTS         Discontinued Medications:  There are no discontinued medications.    PHQ-9 Depression Screening  PHQ-9 Total Score: 4    Little interest or pleasure in doing things? 0-->not at all   Feeling down, depressed, or hopeless? 0-->not at all   Trouble falling or staying asleep, or  sleeping too much? 0-->not at all   Feeling tired or having little energy? 1-->several days   Poor appetite or overeating? 0-->not at all   Feeling bad about yourself - or that you are a failure or have let yourself or your family down? 0-->not at all   Trouble concentrating on things, such as reading the newspaper or watching television? 3-->nearly every day   Moving or speaking so slowly that other people could have noticed? Or the opposite - being so fidgety or restless that you have been moving around a lot more than usual? 0-->not at all   Thoughts that you would be better off dead, or of hurting yourself in some way? 0-->not at all   PHQ-9 Total Score 4     YOLA-7  Feeling nervous, anxious or on edge: Not at all  Not being able to stop or control worrying: Not at all  Worrying too much about different things: Not at all  Trouble Relaxing: Several days  Being so restless that it is hard to sit still: More than half the days  Feeling afraid as if something awful might happen: Not at all  Becoming easily annoyed or irritable: Several days  YOLA 7 Total Score: 4  If you checked any problems, how difficult have these problems made it for you to do your work, take care of things at home, or get along with other people: Somewhat difficult    Mental Status Exam:   Appearance: good eye contact, normal street clothes, groomed, sitting in chair   Behavior: pleasant and cooperative  Motor: no abnormal  Speech: normal rhythm, rate, volume, tone, not hyperverbal, not pressured, normal prosidy  Mood: Anxious  Affect: euthymic  Thought Content: negative suicidal ideations, negative homicidal ideations, negative obsessions  Perceptions: negative auditory hallucinations, negative visual hallucinations, negative delusions, negative paranoia  Thought Process: goal directed, linear  Insight/Judgement: fair/fair  Cognition: grossly intact  Attention: intact  Orientation: person, place, time and situation  Memory: intact    Review of  Systems:   Constitutional: Denies fatigue, night sweats  Eyes: Denies double vision, blurred vision  HENT: Denies vertigo, recent head injury  Cardiovascular: Denies chest pain, irregular heartbeats  Respiratory: Denies productive cough, shortness of breath  Gastrointestinal: Denies nausea, vomiting  Genitourinary: Denies dysuria, urinary retention  Integument: Denies hair growth change, new skin lesions  Neurologic: Denies altered mental status, seizures  Musculoskeletal: Denies joint swelling, limitation of motion  Endocrine: Denies cold intolerance, heat intolerance  Psychiatric: See mental status exam  Allergic-immunologic: Denies frequent illnesses    Psychotherapy:   17 minutes of supportive psychotherapy with goal to strengthen defenses, promote problems solving, restore adaptive functioning and provide symptom relief. The therapeutic alliance was strengthened to encourage the patient to express their thoughts and feelings. Esteem building was enhanced through praise, reassurance, normalizing and encouragement. Coping skills were enhanced to build distress tolerance skills and emotional regulation. Allowed patient to freely discuss issues without interruption or judgement with unconditional positive regard, active listening skills, and empathy. Provided a safe, confidential environment to facilitate the development of a positive therapeutic relationship and encourage open, honest communication. Assisted patient in identifying risk factors which would indicate the need for higher level of care including thoughts to harm self or others and/or self-harming behavior and encouraged patient to contact this office, call 911, or present to the nearest emergency room should any of these events occur. Assisted patient in processing session content; acknowledged and normalized patient’s thoughts, feelings, and concerns by utilizing a person-centered approach in efforts to build appropriate rapport and a positive  therapeutic relationship with open and honest communication. Patient given education on medication side effects, diagnosis/illness and relapse symptoms. Plan to continue supportive psychotherapy in next appointment to provide symptom relief.      Diagnoses and all orders for this visit:    1. Generalized anxiety disorder (Primary)    2. Attention deficit hyperactivity disorder (ADHD), predominantly inattentive type         PLAN:   UDS is satisfactory  POC Urine Drug Screen [facility] (01/30/2023 14:53)  EDU is satisfactory  MICROFILM RECORDS - SCAN - MANUAL [place] EDU REPORT 01/01/2023-01/24/2023 (01/26/2023)  CSA  MICROFILM RECORDS - SCAN - KAVEH SIGNED [facility] BEHAVIORAL HE[facility] CONTROLLED SUB CONTRACT (01/30/2023)  Start Adderall XR 5 mg  Follow-up 3 weeks  Presentation seems most consistent with ADHD and YOLA.  Will start Adderall XR for management of ADHD, anxiety, and overall mood.  I extensively discussed the need to have a satisfactory urine drug screen and a signed controlled substances agreement. Patient verbalized understanding and is agreeable to this plan.  Addressed all questions and concerns.  Continue psychotherapeutic modalities as indicated.    TREATMENT PLAN/GOALS:  Treatment plan: Continue supportive psychotherapy efforts and medications as indicated. Continue to challenge patterns of living conducive to pathology, strengthen defenses, promote problems solving, restore adaptive functioning and provide symptom relief. Treatment and medication options discussed during today's visit. Patient acknowledged and verbally consented to continue with current treatment plan and was educated on the importance of compliance with treatment and follow-up appointments.  Functional status:Good  Prognosis: Good  Progress:  We will monitor for continued improvement    1. Safety: No acute safety concerns.   2. Therapy: Declines  3. Risk Assessment: Risk of self-harm acutely is low to moderate.  Risk factors  include anxiety disorder, mood disorder, and recent psychosocial stressors (pandemic). Protective factors include no family history, denies access to guns/weapons, no present SI, no history of suicide attempts or self-harm in the past, minimal AODA, healthcare seeking, future orientation, willingness to engage in care.  Risk of self-harm chronically is also low to moderate, but could be further elevated in the event of treatment noncompliance and/or AODA.  4. Labs/studies: No labs/studies ordered at this time  Medications: No orders of the defined types were placed in this encounter.  5.    Medication Education:   ADDERALL XR (AMPHETAMINE) Risks, benefits, side effects discussed with patient including elevated heart rate, elevated blood pressure, irritability, insomnia, sexual dysfunction, appetite suppressing properties, psychosis.  After discussion of these risks and benefits, the patient voiced understanding and agreed to proceed. Edu reviewed, UDS ordered, and controlled substance agreement signed & witnessed.    Medication Issues:  EDU reviewed as expected and is satisfactory  Discussed medication options and treatment plan of prescribed medication as well as the risks, benefits, and side effects including potential falls, possible impaired driving and metabolic adversities among others. Patient is agreeable to call the office with any worsening of symptoms or onset of side effects. Patient is agreeable to call 911 or go to the nearest ER should he/she begin having SI/HI. No medication side effects or related complaints today.   6. Follow-up: Return in about 3 weeks (around 2/20/2023) for Next scheduled follow up, Recheck.         This document has been electronically signed by VALENTIN Momin  January 30, 2023 15:00 EST    Please note that portions of this note were completed with a voice recognition program.  Copied text in this note has been reviewed and is accurate as of 01/30/23

## 2023-01-30 ENCOUNTER — CLINICAL SUPPORT (OUTPATIENT)
Dept: INTERNAL MEDICINE | Facility: CLINIC | Age: 28
End: 2023-01-30
Payer: COMMERCIAL

## 2023-01-30 ENCOUNTER — OFFICE VISIT (OUTPATIENT)
Dept: BEHAVIORAL HEALTH | Facility: CLINIC | Age: 28
End: 2023-01-30
Payer: COMMERCIAL

## 2023-01-30 VITALS
DIASTOLIC BLOOD PRESSURE: 78 MMHG | WEIGHT: 179.3 LBS | HEART RATE: 53 BPM | SYSTOLIC BLOOD PRESSURE: 115 MMHG | HEIGHT: 62 IN | BODY MASS INDEX: 33 KG/M2

## 2023-01-30 DIAGNOSIS — Z79.899 MEDICATION MANAGEMENT: Primary | ICD-10-CM

## 2023-01-30 DIAGNOSIS — F41.1 GENERALIZED ANXIETY DISORDER: Primary | ICD-10-CM

## 2023-01-30 DIAGNOSIS — F90.0 ATTENTION DEFICIT HYPERACTIVITY DISORDER (ADHD), PREDOMINANTLY INATTENTIVE TYPE: ICD-10-CM

## 2023-01-30 DIAGNOSIS — F90.2 ATTENTION DEFICIT HYPERACTIVITY DISORDER, COMBINED TYPE: Primary | ICD-10-CM

## 2023-01-30 PROCEDURE — 99214 OFFICE O/P EST MOD 30 MIN: CPT

## 2023-01-30 PROCEDURE — 90833 PSYTX W PT W E/M 30 MIN: CPT

## 2023-01-30 PROCEDURE — 80305 DRUG TEST PRSMV DIR OPT OBS: CPT | Performed by: NURSE PRACTITIONER

## 2023-01-30 RX ORDER — DEXTROAMPHETAMINE SACCHARATE, AMPHETAMINE ASPARTATE MONOHYDRATE, DEXTROAMPHETAMINE SULFATE AND AMPHETAMINE SULFATE 1.25; 1.25; 1.25; 1.25 MG/1; MG/1; MG/1; MG/1
5 CAPSULE, EXTENDED RELEASE ORAL DAILY
Qty: 30 CAPSULE | Refills: 0 | Status: SHIPPED | OUTPATIENT
Start: 2023-01-30 | End: 2023-02-21 | Stop reason: DRUGHIGH

## 2023-01-30 NOTE — PATIENT INSTRUCTIONS
1.  Please return to clinic at your next scheduled visit.  Please contact the clinic ([phone]) at least 24 hours prior in the event you need to cancel.  2.  Do no harm to yourself or others.    3.  Avoid alcohol and drugs.    4.  Take all medications as prescribed.  Please contact the clinic with any concerns. If you are in need of medication refills, please call the clinic at [phone].    5. Should you want to get in touch with your provider, VALENTIN Momin, please contact the office ([phone]), and staff will be able to page Ginger directly.  6. In the event you have personal crisis, contact the following crisis numbers: Suicide Prevention Hotline 1-295.384.9942; [facility] Helpline 6-575-309-ECUS; [facility] Emergency Room [phone]; text HELLO to [phone]; or 711.     SPECIFIC RECOMMENDATIONS:     1.      Medications discussed at this encounter: ADDERALL XR (AMPHETAMINE) Risks, benefits, side effects discussed with patient including elevated heart rate, elevated blood pressure, irritability, insomnia, sexual dysfunction, appetite suppressing properties, psychosis.  After discussion of these risks and benefits, the patient voiced understanding and agreed to proceed. [place] reviewed, UDS ordered, and controlled substance agreement signed & witnessed.                      2.      Psychotherapy recommendations: We will continue therapy at future visits.     3.     Return to clinic:  3 weeks

## 2023-01-30 NOTE — TELEPHONE ENCOUNTER
Adderall XR 5 mg p.o. daily    Satisfactory UDS  POC Urine Drug Screen [facility] (01/30/2023 14:53)    Satisfactory EDU  MICROFILM RECORDS - SCAN - MANUAL [facility] REPORT 01/01/2023-01/24/2023 (01/26/2023)    CSA  MICROFILM RECORDS - SCAN - KAVEH [facility]ON [facility] [place] [facility] CONTROLLED SUB CONTRACT (01/30/2023)

## 2023-02-02 ENCOUNTER — TELEPHONE (OUTPATIENT)
Dept: PSYCHIATRY | Facility: CLINIC | Age: 28
End: 2023-02-02
Payer: COMMERCIAL

## 2023-02-02 NOTE — TELEPHONE ENCOUNTER
A prior authorization for the patient's adderall xr 5mg has been sent to the plan.  Awaiting rtesponse from insurance

## 2023-02-06 NOTE — TELEPHONE ENCOUNTER
Prior authorization for Adderall XR 5mg APPROVED from 01/03/2023 to 02/02/2024.     BEHAVIORAL HEALTH - SCAN - ADDERALL XR PA APPROVAL, [facility], 02/02/2023 (02/02/2023)

## 2023-02-20 ENCOUNTER — OFFICE VISIT (OUTPATIENT)
Dept: BEHAVIORAL HEALTH | Facility: CLINIC | Age: 28
End: 2023-02-20
Payer: COMMERCIAL

## 2023-02-20 VITALS
DIASTOLIC BLOOD PRESSURE: 78 MMHG | HEIGHT: 62 IN | SYSTOLIC BLOOD PRESSURE: 108 MMHG | BODY MASS INDEX: 31.14 KG/M2 | WEIGHT: 169.2 LBS | HEART RATE: 98 BPM

## 2023-02-20 DIAGNOSIS — F90.0 ATTENTION DEFICIT HYPERACTIVITY DISORDER (ADHD), PREDOMINANTLY INATTENTIVE TYPE: Primary | ICD-10-CM

## 2023-02-20 DIAGNOSIS — F41.1 GENERALIZED ANXIETY DISORDER: ICD-10-CM

## 2023-02-20 PROCEDURE — 99214 OFFICE O/P EST MOD 30 MIN: CPT

## 2023-02-20 PROCEDURE — 90833 PSYTX W PT W E/M 30 MIN: CPT

## 2023-02-20 NOTE — PROGRESS NOTES
"[facility]  Medication Management Follow-up    Referring Provider:  Olesya Mcmahon, APRN  [id number] [facility]  [address]  [place] [address]    Vital Signs:   /78   Pulse 98   Ht 157.5 cm (62\")   Wt 76.7 kg (169 lb 3.2 oz)   BMI 30.95 kg/m²    Visit Diagnoses:    ICD-10-CM ICD-9-CM   1. Attention deficit hyperactivity disorder (ADHD), predominantly inattentive type  F90.0 314.00   2. Generalized anxiety disorder  F41.1 300.02       Chief Complaint: ADHD.  Anxiety.    History of Present Illness:   02/20/2023Amiguel angel Glez is a 27 y.o. female who presents today for follow up for ADHD and anxiety.  Patient reports that her focus and concentration have greatly improved with the initiation of Adderall for treatment of her ADHD.  She described taking the medication and felt like it made her mind more \"quiet.\"  Patient says that she is already feeling like she can complete tasks more appropriately and feels more organized.  She does feel like she could use a little bit more help as she is going to be starting a new position on [employer].  Denies SI/HI/AVH.  Denies depressed mood.  PHQ-9 is 2 and YOLA-7 is 0.     Record Review is below for 02/20/2023 : I have thoroughly reviewed the patient's electronic medical record to include previous encounters, care everywhere, notes, medications, labs, EUD and UDS (if applicable), imaging, and EKG's.  Pertinent information is included in this note.  1/4/2023 TSH and CBC are both reassuring and within normal limits, ALT is elevated at 46, however remaining CMP is reassuring, no other labs, imaging, procedures, EKGs in record.     Letty Glez is a 27 y.o. female who presents today for follow up for ADHD, and anxiety. Patient is still having issues with focus and concentration. She has a compelling childhood ADHD assessment however was never referred or officially tested. She also has a compelling adult presentation of ADHD symptoms.  We have " been having difficulty finding an opening for neuropsychological testing. Denies depressed mood, does have symptoms of anxiety.  She states that this week has been even worse for her.  She is losing things, having irritability, and has also found that she sometimes purchases things that she already has because she forgot that she purchased them.  She is ultimately concerned about getting the symptoms under control because she is about to start a new job.  We may need to begin ADHD medication in order to not delay treatment any further for her.  We discussed the need for satisfactory urine drug screen and sign controlled substances agreement.  Denies SI/HI/AVH. PHQ-9, 4 YOLA-7, 4 and the PHQ-9 is congruent with assessment and presentation, however the YOLA-7 presents as somewhat incongruent with her anxiety symptoms.    UDS is satisfactory  POC Urine Drug Screen [facility] (01/30/2023 14:53)  EDU is satisfactory  MICROFILM RECORDS - SCAN - MANUAL [place] EDU REPORT 01/01/2023-01/24/2023 (01/26/2023)  CSA  MICROFILM RECORDS - SCAN - KAVEH SIGNED [facility] CONTROLLED SUB CONTRACT (01/30/2023)  Start Adderall XR 5 mg  Follow-up 3 weeks  Presentation seems most consistent with ADHD and YOLA.  Will start Adderall XR for management of ADHD, anxiety, and overall mood.  I extensively discussed the need to have a satisfactory urine drug screen and a signed controlled substances agreement. Patient verbalized understanding and is agreeable to this plan.  Addressed all questions and concerns.  Continue psychotherapeutic modalities as indicated.    Record Review is below for 01/30/2023 : I have thoroughly reviewed the patient's electronic medical record to include previous encounters, care everywhere, notes, medications, labs, EDU and UDS (if applicable), imaging, and EKG's.  Pertinent information is included in this note.  1/4/2023 TSH and CBC are both reassuring and within normal limits, ALT is elevated at  46, however remaining CMP is reassuring, no other labs, imaging, procedures, EKGs in record.      1/23/2023 Letty Glez is a 27 y.o. female who presents today for follow up For ADHD, and anxiety. Patient is still having issues with focus and concentration, with hyperactivity. She has a compelling childhood ADHD assessment however was never referred or officially tested. She also has a compelling adult presentation of ADHD symptoms. Denies depressed mood, does have symptoms of anxiety.  Denies SI/HI/AVH. PHQ-9, 4 YLOA-7, 5 and the PHQ-9 is congruent with assessment and presentation, however the YOLA-7 presents as somewhat incongruent with her anxiety symptoms.  We discussed the possibility of starting Wellbutrin to target ADHD symptoms.  Patient has had extremely negative experience with medications in the past.  She is hesitant to begin a medication while also starting a new job and would like to wait to confirm ADHD diagnosis.  Presentation seems most consistent with anxiety and ADHD.  Will continue to wait for the phone call for her ADHD testing.  I extensively discussed some other options that we may be able to consider regarding treating her anxiety and ADHD. Patient verbalized understanding and is agreeable to this plan.  Addressed all questions and concerns.  Continue psychotherapeutic modalities as indicated.    Record Review is below for 01/23/2023 : I have thoroughly reviewed the patient's electronic medical record to include previous encounters, care everywhere, notes, medications, labs, EDU and UDS (if applicable), imaging, and EKG's.  Pertinent information is included in this note.  TSH and CBC are both reassuring and within normal limits, ALT is elevated at 46, however remaining CMP is reassuring, no other labs, imaging, procedures, EKGs in record.  No records for 6-month date range in EDU.    1/9/2023 I am referring her to Dr. Petty, PhD for testing to confirm diagnosis  I expect that she will  have a positive diagnosis of ADHD  I would also expect that she will have a satisfactory UDS  Follow-up 1 month  Presentation seems most consistent with generalized anxiety disorder and ADHD DSM-5 criteria.  No medication orders at this time as patient does not wish to start medication and wishes to confirm the diagnosis with testing.  We discussed that treatment of her ADHD could potentially help eliminate her anxiety while increasing concentration and focus.  Will refer to Dr. Petty, PhD for testing to confirm diagnosis of ADHD.  I would expect that she will have a satisfactory UDS and sign a CSA should we consider controlled substances and treatment.  I extensively discussed this with the patient. Patient verbalized understanding and is agreeable to this plan.  Addressed all questions and concerns.  Continue psychotherapeutic modalities as indicated.    Psychiatric/Behavioral Health History  Began Treatment: 2019  Diagnoses: Anxiety  Psychiatrist: Denies, Family dr ordered medicine for anxiety (Celexa) but she did not take it  Therapist: Denies  Admission History: Denies  Medication Trials: buspar, hydroxyzine  Self Harm: Denies  Suicide Attempts: Denies  Trauma: Denies    PHQ-9 Depression Screening  PHQ-9 Total Score: 2    Little interest or pleasure in doing things? 0-->not at all   Feeling down, depressed, or hopeless? 0-->not at all   Trouble falling or staying asleep, or sleeping too much? 0-->not at all   Feeling tired or having little energy? 1-->several days   Poor appetite or overeating? 0-->not at all   Feeling bad about yourself - or that you are a failure or have let yourself or your family down? 0-->not at all   Trouble concentrating on things, such as reading the newspaper or watching television? 1-->several days   Moving or speaking so slowly that other people could have noticed? Or the opposite - being so fidgety or restless that you have been moving around a lot more than usual? 0-->not at all    Thoughts that you would be better off dead, or of hurting yourself in some way? 0-->not at all   PHQ-9 Total Score 2     YOLA-7  Feeling nervous, anxious or on edge: Not at all  Not being able to stop or control worrying: Not at all  Worrying too much about different things: Not at all  Trouble Relaxing: Not at all  Being so restless that it is hard to sit still: Not at all  Feeling afraid as if something awful might happen: Not at all  Becoming easily annoyed or irritable: Not at all  YOLA 7 Total Score: 0  If you checked any problems, how difficult have these problems made it for you to do your work, take care of things at home, or get along with other people: Somewhat difficult  Labs:  WBC   Date Value Ref Range Status   01/04/2023 5.86 3.40 - 10.80 10*3/mm3 Final     Platelets   Date Value Ref Range Status   01/04/2023 227 140 - 450 10*3/mm3 Final     Hemoglobin   Date Value Ref Range Status   01/04/2023 14.1 12.0 - 15.9 g/dL Final     Hematocrit   Date Value Ref Range Status   01/04/2023 40.9 34.0 - 46.6 % Final     Glucose   Date Value Ref Range Status   01/04/2023 86 65 - 99 mg/dL Final     Creatinine   Date Value Ref Range Status   01/04/2023 0.74 0.57 - 1.00 mg/dL Final     ALT (SGPT)   Date Value Ref Range Status   01/04/2023 46 (H) 1 - 33 U/L Final     AST (SGOT)   Date Value Ref Range Status   01/04/2023 31 1 - 32 U/L Final     BUN   Date Value Ref Range Status   01/04/2023 13 6 - 20 mg/dL Final     eGFR   Date Value Ref Range Status   01/04/2023 113.2 >60.0 mL/min/1.73 Final     Comment:     National Kidney Foundation and American Society of Nephrology (ASN) Task Force recommended calculation based on the Chronic Kidney Disease Epidemiology Collaboration (CKD-EPI) equation refit without adjustment for race.     TSH   Date Value Ref Range Status   01/04/2023 1.070 0.270 - 4.200 uIU/mL Final      Pain Management Panel     Pain Management Panel Latest Ref Rng & Units 1/30/2023    AMPHETAMINES SCREEN, URINE  Negative Negative    BARBITURATES SCREEN Negative Negative    BENZODIAZEPINE SCREEN, URINE Negative Negative    BUPRENORPHINEUR Negative Negative    COCAINE SCREEN, URINE Negative Negative    METHADONE SCREEN, URINE Negative Negative    METHAMPHETAMINEUR Negative Negative           Imaging Results:  No Images in the past 120 days found..  EKG Results: None in record      Current Medications:   Current Outpatient Medications   Medication Sig Dispense Refill   • ALBUTEROL IN      • amphetamine-dextroamphetamine XR (Adderall XR) 5 MG 24 hr capsule Take 1 capsule by mouth Daily 30 capsule 0   • cetirizine (zyrTEC) 10 MG tablet Take 10 mg by mouth Daily.     • dicyclomine (BENTYL) 10 MG capsule Take 1 capsule by mouth 3 (Three) Times a Day As Needed (loose stools/ IBS). 60 capsule 2   • multivitamin with minerals tablet tablet Take 1 tablet by mouth Daily.     • sodium chloride 0.9 % nebulizer solution 0.82 mL with albuterol (5 MG/ML) 0.5% nebulizer solution 0.9 mg Take 15 mg/hr by nebulization Continuous.       No current facility-administered medications for this visit.       Problem List:  Patient Active Problem List   Diagnosis   • Concentration deficit   • Diarrhea   • Thyroid disorder screen   • Irritable bowel syndrome with diarrhea       Allergy:   Allergies   Allergen Reactions   • Mixed Feathers Other (See Comments)     Told as child to not take (CHICKEN FEATHERS)   • Codeine Nausea And Vomiting   • Haemophilus Influenzae Vaccines Other (See Comments)     ALLERGIC TO CHICKEN FEATHERS AND CHICKEN BASED INGREDIENTS         Discontinued Medications:  There are no discontinued medications.    Mental Status Exam:   Appearance: good eye contact, normal street clothes, groomed, sitting in chair   Behavior: pleasant and cooperative  Motor: no abnormal  Speech: normal rhythm, rate, volume, tone, not hyperverbal, not pressured, normal prosidy  Mood: Euthymic  Affect: Appropriate  Thought Content: negative suicidal  ideations, negative homicidal ideations, negative obsessions  Perceptions: negative auditory hallucinations, negative visual hallucinations, negative delusions, negative paranoia  Thought Process: goal directed, linear  Insight/Judgement: fair/fair  Cognition: grossly intact  Attention: intact  Orientation: person, place, time and situation  Memory: intact    Review of Systems:   Constitutional: Denies fatigue, night sweats  Eyes: Denies double vision, blurred vision  HENT: Denies vertigo, recent head injury  Cardiovascular: Denies chest pain, irregular heartbeats  Respiratory: Denies productive cough, shortness of breath  Gastrointestinal: Denies nausea, vomiting  Genitourinary: Denies dysuria, urinary retention  Integument: Denies hair growth change, new skin lesions  Neurologic: Denies altered mental status, seizures  Musculoskeletal: Denies joint swelling, limitation of motion  Endocrine: Denies cold intolerance, heat intolerance  Psychiatric: See mental status exam  Allergic-immunologic: Denies frequent illnesses    Psychotherapy:     23 minutes of supportive psychotherapy with goal to strengthen defenses, promote problems solving, restore adaptive functioning and provide symptom relief. The therapeutic alliance was strengthened to encourage the patient to express their thoughts and feelings. Esteem building was enhanced through praise, reassurance, normalizing and encouragement. Coping skills were enhanced to build distress tolerance skills and emotional regulation. Allowed patient to freely discuss issues without interruption or judgement with unconditional positive regard, active listening skills, and empathy. Provided a safe, confidential environment to facilitate the development of a positive therapeutic relationship and encourage open, honest communication. Assisted patient in identifying risk factors which would indicate the need for higher level of care including thoughts to harm self or others and/or  self-harming behavior and encouraged patient to contact this office, call 911, or present to the nearest emergency room should any of these events occur. Assisted patient in processing session content; acknowledged and normalized patient’s thoughts, feelings, and concerns by utilizing a person-centered approach in efforts to build appropriate rapport and a positive therapeutic relationship with open and honest communication. Patient given education on medication side effects, diagnosis/illness and relapse symptoms. Plan to continue supportive psychotherapy in next appointment to provide symptom relief.      Diagnoses and all orders for this visit:    1. Attention deficit hyperactivity disorder (ADHD), predominantly inattentive type (Primary)    2. Generalized anxiety disorder         PLAN:   Increase Adderall XR to 10 mg daily  Patient will return Adderall XR 5 mg to pharmacy  Follow-up 1 month  Presentation seems most consistent with ADHD and YOLA.  Will increase Adderall XR to further target ADHD and anxiety.  I extensively discussed the need for the patient to return the remaining Adderall XR 5 mg to pharmacy in exchange for the new prescription. Patient verbalized understanding and is agreeable to this plan.  Addressed all questions and concerns.  Continue psychotherapeutic modalities as indicated.    TREATMENT PLAN/GOALS:  Treatment plan: Continue supportive psychotherapy efforts and medications as indicated. Continue to challenge patterns of living conducive to pathology, strengthen defenses, promote problems solving, restore adaptive functioning and provide symptom relief. Treatment and medication options discussed during today's visit. Patient acknowledged and verbally consented to continue with current treatment plan and was educated on the importance of compliance with treatment and follow-up appointments.  Functional status:Good  Prognosis: Good  Progress: Continued improvement    1. Safety: No acute  safety concerns.   2. Therapy: Declines  3. Risk Assessment: Risk of self-harm acutely is low to moderate.  Risk factors include anxiety disorder, mood disorder, and recent psychosocial stressors (pandemic). Protective factors include no family history, denies access to guns/weapons, no present SI, no history of suicide attempts or self-harm in the past, minimal AODA, healthcare seeking, future orientation, willingness to engage in care.  Risk of self-harm chronically is also low to moderate, but could be further elevated in the event of treatment noncompliance and/or AODA.  4. Labs/studies: No labs/studies ordered at this time  5. Medications: Adderall XR 10 mg by mouth daily  Medication Education:   ADDERALL XR (AMPHETAMINE) Risks, benefits, side effects discussed with patient including elevated heart rate, elevated blood pressure, irritability, insomnia, sexual dysfunction, appetite suppressing properties, psychosis.  After discussion of these risks and benefits, the patient voiced understanding and agreed to proceed. Edu reviewed, UDS ordered, and controlled substance agreement signed & witnessed.    Medication Issues:  EDU reviewed as expected.  Discussed medication options and treatment plan of prescribed medication as well as the risks, benefits, and side effects including potential falls, possible impaired driving and metabolic adversities among others. Patient is agreeable to call the office with any worsening of symptoms or onset of side effects. Patient is agreeable to call 911 or go to the nearest ER should he/she begin having SI/HI. No medication side effects or related complaints today.   6. Follow-up: Return in about 1 month (around 3/20/2023) for Recheck, Next scheduled follow up.         This document has been electronically signed by VALENTIN Momin  February 20, 2023 15:05 EST    Please note that portions of this note were completed with a voice recognition program.  Copied text in this  note has been reviewed and is accurate as of 02/20/23    Answers for HPI/ROS submitted by the patient on 2/13/2023  Please describe your symptoms.: Follow up  Have you had these symptoms before?: Yes  How long have you been having these symptoms?: Greater than 2 weeks  What is the primary reason for your visit?: Other

## 2023-02-20 NOTE — PATIENT INSTRUCTIONS
1.  Please return to clinic at your next scheduled visit.  Please contact the clinic ([phone]) at least 24 hours prior in the event you need to cancel.  2.  Do no harm to yourself or others.    3.  Avoid alcohol and drugs.    4.  Take all medications as prescribed.  Please contact the clinic with any concerns. If you are in need of medication refills, please call the clinic at [phone].    5. Should you want to get in touch with your provider, VALNETIN Momin, please contact the office ([phone]), and staff will be able to page Ginger directly.  6. In the event you have personal crisis, contact the following crisis numbers: Suicide Prevention Hotline 1-420.187.4100; [facility]NA Helpline 1-587-962-DDVS; [facility] [phone]; text HELLO to 380334; or 126.     SPECIFIC RECOMMENDATIONS:     1.      Medications discussed at this encounter: Adderall XR                    2.      Psychotherapy recommendations: We will continue therapy at future visits.     3.     Return to clinic: 1 month

## 2023-02-21 DIAGNOSIS — F90.2 ATTENTION DEFICIT HYPERACTIVITY DISORDER, COMBINED TYPE: ICD-10-CM

## 2023-02-21 DIAGNOSIS — F90.0 ADHD (ATTENTION DEFICIT HYPERACTIVITY DISORDER), INATTENTIVE TYPE: Primary | ICD-10-CM

## 2023-02-21 RX ORDER — DEXTROAMPHETAMINE SACCHARATE, AMPHETAMINE ASPARTATE MONOHYDRATE, DEXTROAMPHETAMINE SULFATE AND AMPHETAMINE SULFATE 2.5; 2.5; 2.5; 2.5 MG/1; MG/1; MG/1; MG/1
10 CAPSULE, EXTENDED RELEASE ORAL DAILY
Qty: 30 CAPSULE | Refills: 0 | Status: SHIPPED | OUTPATIENT
Start: 2023-02-21 | End: 2023-02-23 | Stop reason: SDUPTHER

## 2023-02-21 NOTE — TELEPHONE ENCOUNTER
Medication management follow-up    Increase Adderall XR to 10 mg by mouth daily    Discontinue Adderall XR 5 mg    Patient agreeable to return any Adderall XR 5 mg to pharmacy for exchange    EDU satisfactory  MICROFILM RECORDS - SCAN - MANUAL [place] EDU REPORT 02/15/2022-02/15/2023 (02/17/2023)    UDS satisfactory  POC Urine Drug Screen [facility] (01/30/2023 14:53)    CSA in record  MICROFILM RECORDS - SCAN - KAVEH SIGNED [facility] CONTROLLED SUB CONTRACT (01/30/2023)

## 2023-02-22 RX ORDER — DEXTROAMPHETAMINE SACCHARATE, AMPHETAMINE ASPARTATE MONOHYDRATE, DEXTROAMPHETAMINE SULFATE AND AMPHETAMINE SULFATE 2.5; 2.5; 2.5; 2.5 MG/1; MG/1; MG/1; MG/1
10 CAPSULE, EXTENDED RELEASE ORAL DAILY
Qty: 30 CAPSULE | Refills: 0 | Status: CANCELLED | OUTPATIENT
Start: 2023-02-22

## 2023-02-23 DIAGNOSIS — F90.0 ADHD (ATTENTION DEFICIT HYPERACTIVITY DISORDER), INATTENTIVE TYPE: ICD-10-CM

## 2023-02-23 NOTE — TELEPHONE ENCOUNTER
Removed pended Adderall XR 10mg and repended in new encounter.     Refill with Ginger Thakur APRN (02/23/2023)

## 2023-02-23 NOTE — TELEPHONE ENCOUNTER
Called pt back to confirm she needs adderall XR 10mg to be ordered to [facility] in [place] as her preferred pharmacy does not have this medication in stock.     Pt has upcoming appt on 03/21/2023.     Medication order pended to updated pharmacy.

## 2023-02-23 NOTE — TELEPHONE ENCOUNTER
Pt called to report preferred pharmacy does not have Adderall XR 10mg in stock, however [facility] does have it in stock.     Pt would like this order to be sent to [facility] so she's able to pickup her medication.     Pt did not specify which [facility] pharmacy to have this order sent to. Will call pt back to inquire.

## 2023-02-24 RX ORDER — DEXTROAMPHETAMINE SACCHARATE, AMPHETAMINE ASPARTATE MONOHYDRATE, DEXTROAMPHETAMINE SULFATE AND AMPHETAMINE SULFATE 2.5; 2.5; 2.5; 2.5 MG/1; MG/1; MG/1; MG/1
10 CAPSULE, EXTENDED RELEASE ORAL DAILY
Qty: 30 CAPSULE | Refills: 0 | Status: SHIPPED | OUTPATIENT
Start: 2023-02-24 | End: 2023-03-21 | Stop reason: SDUPTHER

## 2023-03-21 ENCOUNTER — TELEMEDICINE (OUTPATIENT)
Dept: BEHAVIORAL HEALTH | Facility: CLINIC | Age: 28
End: 2023-03-21
Payer: COMMERCIAL

## 2023-03-21 DIAGNOSIS — F41.1 GENERALIZED ANXIETY DISORDER: ICD-10-CM

## 2023-03-21 DIAGNOSIS — F90.0 ADHD (ATTENTION DEFICIT HYPERACTIVITY DISORDER), INATTENTIVE TYPE: ICD-10-CM

## 2023-03-21 DIAGNOSIS — F90.0 ATTENTION DEFICIT HYPERACTIVITY DISORDER (ADHD), PREDOMINANTLY INATTENTIVE TYPE: Primary | ICD-10-CM

## 2023-03-21 PROCEDURE — 90833 PSYTX W PT W E/M 30 MIN: CPT

## 2023-03-21 PROCEDURE — 99214 OFFICE O/P EST MOD 30 MIN: CPT

## 2023-03-21 RX ORDER — DEXTROAMPHETAMINE SACCHARATE, AMPHETAMINE ASPARTATE MONOHYDRATE, DEXTROAMPHETAMINE SULFATE AND AMPHETAMINE SULFATE 2.5; 2.5; 2.5; 2.5 MG/1; MG/1; MG/1; MG/1
10 CAPSULE, EXTENDED RELEASE ORAL DAILY
Qty: 30 CAPSULE | Refills: 0 | Status: SHIPPED | OUTPATIENT
Start: 2023-03-26

## 2023-03-21 NOTE — TELEPHONE ENCOUNTER
Refill request for Adderall XR 10 mg daily    Refill date 3/24/2023    I am able to only check dispensed meds.  This was satisfactory.    Unable to check EDU, but I expect it is satisfactory.    UDS satisfactory  CSA in record

## 2023-03-21 NOTE — PATIENT INSTRUCTIONS
1.  Please return to clinic at your next scheduled visit.  Please contact the clinic ([phone]) at least 24 hours prior in the event you need to cancel.  2.  Do no harm to yourself or others.    3.  Avoid alcohol and drugs.    4.  Take all medications as prescribed.  Please contact the clinic with any concerns. If you are in need of medication refills, please call the clinic at [phone].    5. Should you want to get in touch with your provider, VALENTIN Momin, please contact the office ([phone]), and staff will be able to page Ginger directly.  6. In the event you have personal crisis, contact the following crisis numbers: Suicide Prevention Hotline 1-571.845.9481; [facility]NA Helpline 1-776-772-EAFL; [facility] [phone]; text HELLO to 128128; or 366.     SPECIFIC RECOMMENDATIONS:     1.      Medications discussed at this encounter: ADDERALL (AMPHETAMINE) Risks, benefits, side effects discussed with patient including elevated heart rate, elevated blood pressure, irritability, insomnia, sexual dysfunction, appetite suppressing properties, psychosis.  After discussion of these risks and benefits, the patient voiced understanding and agreed to proceed. [place] reviewed, UDS ordered, and controlled substance agreement signed & witnessed.                      2.      Psychotherapy recommendations: We will continue therapy at future visits.     3.     Return to clinic:  6 Weeks

## 2023-03-21 NOTE — PROGRESS NOTES
"[facility]iatry  Medication Management Follow-up  Virtual MyChart Visit  This provider is located at [address] [place] [address], [address], [place] [id number]. The Patient is located at home. Patient is being seen remotely using "[employer]hart. Patient is being seen via telehealth and confirm that they are in a secure environment for this session. The patient's condition being diagnosed/treated is appropriate for telemedicine. The provider identified herself as well as her credentials. They may refuse to be seen remotely at any time. The electronic data is encrypted and password protected, and the patient has been advised of the potential risks to privacy not withstanding such measures. Virtual visit via Zoom audio and video due to the COVID-19 pandemic.  Patient is accepting of and agreeable to visit.  The visit consisted of the patient and I. PT Identifiers used: Name and .    Referring Provider:  No referring provider defined for this encounter.    Vital Signs:   There were no vitals taken for this visit.   Visit Diagnoses:    ICD-10-CM ICD-9-CM   1. Attention deficit hyperactivity disorder (ADHD), predominantly inattentive type  F90.0 314.00   2. Generalized anxiety disorder  F41.1 300.02     Chief Complaint: ADHD.  Anxiety.    History of Present Illness:   miguel angel Glez is a 27 y.o. female who presents today for follow up for ADHD and anxiety.   Letty started her new job, things are going really well. She thinks this job is maybe going to be more stress free.  She is able to able to get dressed up and put on her make-up and this is helping her feel much better. Patient states the increased dose of Adderall XR 10mg is definitely improving her ADHD symptoms. She feels like she in not \"waning\" at the end of the day. Focus, concentration, and energy levels are improved. She is starting to train for a half-marathon.  Anxiety has also improved. She states that she no longer feels that she has \"90 tabs\" " "open in her brain.  Nervous about meeting her boyfriend's mom is coming and going to stay for a month in June. We may be scheduling some appointments for therapy during this time as an outlet for anxiety. Patient has been taking Adderall XR daily and is tolerating it well.  Denies depressed mood.  Denies suicidal ideation.  Denies AVH.  We will continue to monitor for mood, behavior, and safety.    Record Review is below for 03/21/2023 : I have thoroughly reviewed the patient's electronic medical record to include previous encounters, care everywhere, notes, medications, labs, EDU and UDS (if applicable), imaging, and EKG's.  Pertinent information is included in this note.  1/4/2023 TSH and CBC are both reassuring and within normal limits, ALT is elevated at 46, however remaining CMP is reassuring, no other labs, imaging, procedures, EKGs in record.   EKG Results: None in record  02/20/2023Amiguel angel Glez is a 27 y.o. female who presents today for follow up for ADHD and anxiety.  Patient reports that her focus and concentration have greatly improved with the initiation of Adderall for treatment of her ADHD.  She described taking the medication and felt like it made her mind more \"quiet.\"  Patient says that she is already feeling like she can complete tasks more appropriately and feels more organized.  She does feel like she could use a little bit more help as she is going to be starting a new position on [employer].  Denies SI/HI/AVH.  Denies depressed mood.  PHQ-9 is 2 and YOLA-7 is 0.   Increase Adderall XR to 10 mg daily  Patient will return Adderall XR 5 mg to pharmacy  Follow-up 1 month  Presentation seems most consistent with ADHD and YOLA.  Will increase Adderall XR to further target ADHD and anxiety.  I extensively discussed the need for the patient to return the remaining Adderall XR 5 mg to pharmacy in exchange for the new prescription. Patient verbalized understanding and is agreeable to this plan.  " Addressed all questions and concerns.  Continue psychotherapeutic modalities as indicated.    Record Review is below for 02/20/2023 : I have thoroughly reviewed the patient's electronic medical record to include previous encounters, care everywhere, notes, medications, labs, EDU and UDS (if applicable), imaging, and EKG's.  Pertinent information is included in this note.  1/4/2023 TSH and CBC are both reassuring and within normal limits, ALT is elevated at 46, however remaining CMP is reassuring, no other labs, imaging, procedures, EKGs in record.     Letty Glez is a 27 y.o. female who presents today for follow up for ADHD, and anxiety. Patient is still having issues with focus and concentration. She has a compelling childhood ADHD assessment however was never referred or officially tested. She also has a compelling adult presentation of ADHD symptoms.  We have been having difficulty finding an opening for neuropsychological testing. Denies depressed mood, does have symptoms of anxiety.  She states that this week has been even worse for her.  She is losing things, having irritability, and has also found that she sometimes purchases things that she already has because she forgot that she purchased them.  She is ultimately concerned about getting the symptoms under control because she is about to start a new job.  We may need to begin ADHD medication in order to not delay treatment any further for her.  We discussed the need for satisfactory urine drug screen and sign controlled substances agreement.  Denies SI/HI/AVH. PHQ-9, 4 YOLA-7, 4 and the PHQ-9 is congruent with assessment and presentation, however the YOLA-7 presents as somewhat incongruent with her anxiety symptoms.    UDS is satisfactory  POC Urine Drug Screen [place] [facility] (01/30/2023 14:53)  EDU is satisfactory  MICROFILM RECORDS - SCAN - MANUAL [place] EDU REPORT 01/01/2023-01/24/2023 (01/26/2023)  SANTIAG[place]  MICROFILM RECORDS - SCAN - KAVEH SIGNED  [facility] CONTROLLED SUB CONTRACT (01/30/2023)  Start Adderall XR 5 mg  Follow-up 3 weeks  Presentation seems most consistent with ADHD and YOLA.  Will start Adderall XR for management of ADHD, anxiety, and overall mood.  I extensively discussed the need to have a satisfactory urine drug screen and a signed controlled substances agreement. Patient verbalized understanding and is agreeable to this plan.  Addressed all questions and concerns.  Continue psychotherapeutic modalities as indicated.    Record Review is below for 01/30/2023 : I have thoroughly reviewed the patient's electronic medical record to include previous encounters, care everywhere, notes, medications, labs, EDU and UDS (if applicable), imaging, and EKG's.  Pertinent information is included in this note.  1/4/2023 TSH and CBC are both reassuring and within normal limits, ALT is elevated at 46, however remaining CMP is reassuring, no other labs, imaging, procedures, EKGs in record.      1/23/2023 Letty Glez is a 27 y.o. female who presents today for follow up For ADHD, and anxiety. Patient is still having issues with focus and concentration, with hyperactivity. She has a compelling childhood ADHD assessment however was never referred or officially tested. She also has a compelling adult presentation of ADHD symptoms. Denies depressed mood, does have symptoms of anxiety.  Denies SI/HI/AVH. PHQ-9, 4 YOLA-7, 5 and the PHQ-9 is congruent with assessment and presentation, however the YOLA-7 presents as somewhat incongruent with her anxiety symptoms.  We discussed the possibility of starting Wellbutrin to target ADHD symptoms.  Patient has had extremely negative experience with medications in the past.  She is hesitant to begin a medication while also starting a new job and would like to wait to confirm ADHD diagnosis.  Presentation seems most consistent with anxiety and ADHD.  Will continue to wait for the phone call for her ADHD testing.   I extensively discussed some other options that we may be able to consider regarding treating her anxiety and ADHD. Patient verbalized understanding and is agreeable to this plan.  Addressed all questions and concerns.  Continue psychotherapeutic modalities as indicated.    Record Review is below for 01/23/2023 : I have thoroughly reviewed the patient's electronic medical record to include previous encounters, care everywhere, notes, medications, labs, EDU and UDS (if applicable), imaging, and EKG's.  Pertinent information is included in this note.  TSH and CBC are both reassuring and within normal limits, ALT is elevated at 46, however remaining CMP is reassuring, no other labs, imaging, procedures, EKGs in record.  No records for 6-month date range in EDU.    1/9/2023 I am referring her to Dr. Petty, PhD for testing to confirm diagnosis  I expect that she will have a positive diagnosis of ADHD  I would also expect that she will have a satisfactory UDS  Follow-up 1 month  Presentation seems most consistent with generalized anxiety disorder and ADHD DSM-5 criteria.  No medication orders at this time as patient does not wish to start medication and wishes to confirm the diagnosis with testing.  We discussed that treatment of her ADHD could potentially help eliminate her anxiety while increasing concentration and focus.  Will refer to Dr. Petty, PhD for testing to confirm diagnosis of ADHD.  I would expect that she will have a satisfactory UDS and sign a CSA should we consider controlled substances and treatment.  I extensively discussed this with the patient. Patient verbalized understanding and is agreeable to this plan.  Addressed all questions and concerns.  Continue psychotherapeutic modalities as indicated.    Psychiatric/Behavioral Health History  Began Treatment: 2019  Diagnoses: Anxiety  Psychiatrist: Denies, Family dr ordered medicine for anxiety (Celexa) but she did not take it  Therapist:  Denies  Admission History: Denies  Medication Trials: buspar, hydroxyzine  Self Harm: Denies  Suicide Attempts: Denies  Trauma: Denies    Feeling nervous, anxious or on edge: Not at all  Not being able to stop or control worrying: Not at all  Worrying too much about different things: Not at all  Trouble Relaxing: Not at all  Being so restless that it is hard to sit still: Not at all  Feeling afraid as if something awful might happen: Not at all  Becoming easily annoyed or irritable: More than half the days  YOLA 7 Total Score: 2  If you checked any problems, how difficult have these problems made it for you to do your work, take care of things at home, or get along with other people: Not difficult at all  Labs:  WBC   Date Value Ref Range Status   01/04/2023 5.86 3.40 - 10.80 10*3/mm3 Final     Platelets   Date Value Ref Range Status   01/04/2023 227 140 - 450 10*3/mm3 Final     Hemoglobin   Date Value Ref Range Status   01/04/2023 14.1 12.0 - 15.9 g/dL Final     Hematocrit   Date Value Ref Range Status   01/04/2023 40.9 34.0 - 46.6 % Final     Glucose   Date Value Ref Range Status   01/04/2023 86 65 - 99 mg/dL Final     Creatinine   Date Value Ref Range Status   01/04/2023 0.74 0.57 - 1.00 mg/dL Final     ALT (SGPT)   Date Value Ref Range Status   01/04/2023 46 (H) 1 - 33 U/L Final     AST (SGOT)   Date Value Ref Range Status   01/04/2023 31 1 - 32 U/L Final     BUN   Date Value Ref Range Status   01/04/2023 13 6 - 20 mg/dL Final     eGFR   Date Value Ref Range Status   01/04/2023 113.2 >60.0 mL/min/1.73 Final     Comment:     National Kidney Foundation and American Society of Nephrology (ASN) Task Force recommended calculation based on the Chronic Kidney Disease Epidemiology Collaboration (CKD-EPI) equation refit without adjustment for race.     TSH   Date Value Ref Range Status   01/04/2023 1.070 0.270 - 4.200 uIU/mL Final      Pain Management Panel     Pain Management Panel Latest Ref Rng & Units 1/30/2023     AMPHETAMINES SCREEN, URINE Negative Negative    BARBITURATES SCREEN Negative Negative    BENZODIAZEPINE SCREEN, URINE Negative Negative    BUPRENORPHINEUR Negative Negative    COCAINE SCREEN, URINE Negative Negative    METHADONE SCREEN, URINE Negative Negative    METHAMPHETAMINEUR Negative Negative           Imaging Results:  No Images in the past 120 days found..    Current Medications:   Current Outpatient Medications   Medication Sig Dispense Refill   • ALBUTEROL IN      • amphetamine-dextroamphetamine XR (Adderall XR) 10 MG 24 hr capsule Take 1 capsule by mouth Daily 30 capsule 0   • cetirizine (zyrTEC) 10 MG tablet Take 1 tablet by mouth Daily.     • COLLAGEN PO Take  by mouth. OTC     • dicyclomine (BENTYL) 10 MG capsule Take 1 capsule by mouth 3 (Three) Times a Day As Needed (loose stools/ IBS). 60 capsule 2   • Ferrous Sulfate (IRON PO) Take  by mouth. OTC     • multivitamin with minerals tablet tablet Take 1 tablet by mouth Daily.     • sodium chloride 0.9 % nebulizer solution 0.82 mL with albuterol (5 MG/ML) 0.5% nebulizer solution 0.9 mg Take 15 mg/hr by nebulization Continuous.       No current facility-administered medications for this visit.       Problem List:  Patient Active Problem List   Diagnosis   • Concentration deficit   • Diarrhea   • Thyroid disorder screen   • Irritable bowel syndrome with diarrhea       Allergy:   Allergies   Allergen Reactions   • Mixed Feathers Other (See Comments)     Told as child to not take (CHICKEN FEATHERS)   • Codeine Nausea And Vomiting   • Haemophilus Influenzae Vaccines Other (See Comments)     ALLERGIC TO CHICKEN FEATHERS AND CHICKEN BASED INGREDIENTS         Discontinued Medications:  There are no discontinued medications.    Mental Status Exam:   Appearance: good eye contact, normal street clothes, groomed, sitting in chair   Behavior: pleasant and cooperative  Motor: no abnormal  Speech: normal rhythm, rate, volume, tone, not hyperverbal, not pressured,  normal prosidy  Mood: Euthymic  Affect: Appropriate  Thought Content: negative suicidal ideations, negative homicidal ideations, negative obsessions  Perceptions: negative auditory hallucinations, negative visual hallucinations, negative delusions, negative paranoia  Thought Process: goal directed, linear  Insight/Judgement: fair/fair  Cognition: grossly intact  Attention: intact  Orientation: person, place, time and situation  Memory: intact    Review of Systems:   Constitutional: Denies fatigue, night sweats  Eyes: Denies double vision, blurred vision  HENT: Denies vertigo, recent head injury  Cardiovascular: Denies chest pain, irregular heartbeats  Respiratory: Denies productive cough, shortness of breath  Gastrointestinal: Denies nausea, vomiting  Genitourinary: Denies dysuria, urinary retention  Integument: Denies hair growth change, new skin lesions  Neurologic: Denies altered mental status, seizures  Musculoskeletal: Denies joint swelling, limitation of motion  Endocrine: Denies cold intolerance, heat intolerance  Psychiatric: See mental status exam  Allergic-immunologic: Denies frequent illnesses    Psychotherapy:     21 minutes of supportive psychotherapy with goal to strengthen defenses, promote problems solving, restore adaptive functioning and provide symptom relief. The therapeutic alliance was strengthened to encourage the patient to express their thoughts and feelings. Esteem building was enhanced through praise, reassurance, normalizing and encouragement. Coping skills were enhanced to build distress tolerance skills and emotional regulation. Allowed patient to freely discuss issues without interruption or judgement with unconditional positive regard, active listening skills, and empathy. Provided a safe, confidential environment to facilitate the development of a positive therapeutic relationship and encourage open, honest communication. Assisted patient in identifying risk factors which would  indicate the need for higher level of care including thoughts to harm self or others and/or self-harming behavior and encouraged patient to contact this office, call 911, or present to the nearest emergency room should any of these events occur. Assisted patient in processing session content; acknowledged and normalized patient’s thoughts, feelings, and concerns by utilizing a person-centered approach in efforts to build appropriate rapport and a positive therapeutic relationship with open and honest communication. Patient given education on medication side effects, diagnosis/illness and relapse symptoms. Plan to continue supportive psychotherapy in next appointment to provide symptom relief.        Diagnoses and all orders for this visit:    1. Attention deficit hyperactivity disorder (ADHD), predominantly inattentive type (Primary)    2. Generalized anxiety disorder       PLAN:   Continue Adderall XR  Follow-up 6 weeks  Presentation seems most consistent with ADHD and YOLA.  Will continue Adderall XR for management of depression, anxiety, and overall mood.  Patient would like to potentially schedule some visits for therapy when her boyfriend's family comes to visit in June.  Patient verbalized understanding and is agreeable to this plan.  Addressed all questions and concerns.  Continue psychotherapeutic modalities as indicated.    TREATMENT PLAN/GOALS:  Treatment plan: Continue supportive psychotherapy efforts and medications as indicated. Continue to challenge patterns of living conducive to pathology, strengthen defenses, promote problems solving, restore adaptive functioning and provide symptom relief. Treatment and medication options discussed during today's visit. Patient acknowledged and verbally consented to continue with current treatment plan and was educated on the importance of compliance with treatment and follow-up appointments.  Functional status:Good  Prognosis: Good  Progress: Continued  improvement    1. Safety: No acute safety concerns.   2. Therapy: Declines  3. Risk Assessment: Risk of self-harm acutely is low.  Risk factors include anxiety disorder, mood disorder, and recent psychosocial stressors (pandemic). Protective factors include no family history, denies access to guns/weapons, no present SI, no history of suicide attempts or self-harm in the past, minimal AODA, healthcare seeking, future orientation, willingness to engage in care.  Risk of self-harm chronically is also low, but could be further elevated in the event of treatment noncompliance and/or AODA.  4. Labs/studies: No labs/studies ordered at this time  5. Medications: No new medication orders for this encounter  Medication Education:   ADDERALL (AMPHETAMINE) Risks, benefits, side effects discussed with patient including elevated heart rate, elevated blood pressure, irritability, insomnia, sexual dysfunction, appetite suppressing properties, psychosis.  After discussion of these risks and benefits, the patient voiced understanding and agreed to proceed. Edu reviewed, UDS ordered, and controlled substance agreement signed & witnessed.    Medication Issues:  EDU reviewed as expected.  Discussed medication options and treatment plan of prescribed medication as well as the risks, benefits, and side effects including potential falls, possible impaired driving and metabolic adversities among others. Patient is agreeable to call the office with any worsening of symptoms or onset of side effects. Patient is agreeable to call 911 or go to the nearest ER should he/she begin having SI/HI. No medication side effects or related complaints today.   6. Follow-up: Return in about 6 weeks (around 5/2/2023) for Recheck, Next scheduled follow up.         This document has been electronically signed by VALENTIN Momin  March 21, 2023 08:53 EDT    Please note that portions of this note were completed with a voice recognition program.   Copied text in this note has been reviewed and is accurate as of 03/21/23

## 2023-03-24 ENCOUNTER — TELEPHONE (OUTPATIENT)
Dept: PSYCHIATRY | Facility: CLINIC | Age: 28
End: 2023-03-24
Payer: COMMERCIAL

## 2023-03-24 NOTE — TELEPHONE ENCOUNTER
PATIENT RETURNED MY CALL REGARDING THE REFERRAL FOR YAMILEX YUAN. PATIENT STATES SHE IS UNSURE IF SHE STILL NEEDS TO CONTINUE WITH GETTING THE TESTING SINCE BERTA LINN IS ALREADY PRESCRIBING MEDICATION FOR ADHD. PLEASE ADVISE.

## 2023-03-29 NOTE — TELEPHONE ENCOUNTER
Please let patient know that it will be left at her discretion whether or not she would like to continue with the neuropsychological testing.  We will continue to treat ADHD moving forward.  Please have patient phone with any further questions or concerns.    VALENTIN Momin, PMHNP-BC  [facility]  Office: [phone]

## 2023-04-24 DIAGNOSIS — F90.0 ADHD (ATTENTION DEFICIT HYPERACTIVITY DISORDER), INATTENTIVE TYPE: ICD-10-CM

## 2023-04-25 RX ORDER — DEXTROAMPHETAMINE SACCHARATE, AMPHETAMINE ASPARTATE MONOHYDRATE, DEXTROAMPHETAMINE SULFATE AND AMPHETAMINE SULFATE 2.5; 2.5; 2.5; 2.5 MG/1; MG/1; MG/1; MG/1
10 CAPSULE, EXTENDED RELEASE ORAL DAILY
Qty: 30 CAPSULE | Refills: 0 | Status: SHIPPED | OUTPATIENT
Start: 2023-04-25

## 2023-04-25 NOTE — TELEPHONE ENCOUNTER
Medication refill request    Adderall XR 10 mg daily    UDS satisfactory  POC Urine Drug Screen [facility] (01/30/2023 14:53)  CSA in recordMICROFILM RECORDS - LIO - KAVEH SIGNED [facility] CONTROLLED SUB CONTRACT (01/30/2023)  EDU [place]

## 2023-05-02 ENCOUNTER — OFFICE VISIT (OUTPATIENT)
Dept: BEHAVIORAL HEALTH | Facility: CLINIC | Age: 28
End: 2023-05-02
Payer: COMMERCIAL

## 2023-05-02 VITALS
SYSTOLIC BLOOD PRESSURE: 122 MMHG | WEIGHT: 164.9 LBS | HEIGHT: 62 IN | DIASTOLIC BLOOD PRESSURE: 78 MMHG | BODY MASS INDEX: 30.35 KG/M2 | HEART RATE: 61 BPM

## 2023-05-02 DIAGNOSIS — F41.1 GENERALIZED ANXIETY DISORDER: ICD-10-CM

## 2023-05-02 DIAGNOSIS — F90.0 ATTENTION DEFICIT HYPERACTIVITY DISORDER (ADHD), PREDOMINANTLY INATTENTIVE TYPE: Primary | ICD-10-CM

## 2023-05-02 DIAGNOSIS — R41.840 CONCENTRATION DEFICIT: ICD-10-CM

## 2023-05-02 NOTE — PROGRESS NOTES
"[facility]  Medication Management Follow-up    Referring Provider:  Olesya Mcmahon, APRN  [address] [facility]  [address]  [place] [address]    Vital Signs:   /78   Pulse 61   Ht 157.5 cm (62\")   Wt 74.8 kg (164 lb 14.4 oz)   BMI 30.16 kg/m²     Chief Complaint: \"I just feel a lot better\"  History of Present Illness:   Letty Glez is a 27 y.o. female who presents today for follow-up and medication management for:    ICD-10-CM ICD-9-CM   1. Attention deficit hyperactivity disorder (ADHD), predominantly inattentive type  F90.0 314.00   2. Generalized anxiety disorder  F41.1 300.02   3. Concentration deficit  R41.840 799.51       05/02/2023 Patient is taking medications as prescribed and is tolerating them well. \"I am good\"  \"I am doing really good.\" She is enjoying her new schedule and she has more energy.  Her mood has improved. Sleep is good, she is feeling more rested. She is able to wake up and get ready for work. Her new hours are a lot better in this new position. Denies suicidal ideation. Denies AVH. We will continue to monitor for mood, behavior, and safety.    Record Review is below for 05/02/2023 : I have thoroughly reviewed the patient's electronic medical record to include previous encounters, care everywhere, notes, medications, labs, EDU and UDS (if applicable), imaging, and EKG's.  Pertinent information is included in this note.  1/4/2023 TSH and CBC are both reassuring and within normal limits, ALT is elevated at 46, however remaining CMP is reassuring, no other labs, imaging, procedures, EKGs in record.   EKG Results: None in record    03/21/2023Amiguel angel Glez is a 27 y.o. female who presents today for follow up for ADHD and anxiety.   Letty started her new job, things are going really well. She thinks this job is maybe going to be more stress free.  She is able to able to get dressed up and put on her make-up and this is helping her feel much better. " "Patient states the increased dose of Adderall XR 10mg is definitely improving her ADHD symptoms. She feels like she in not \"waning\" at the end of the day. Focus, concentration, and energy levels are improved. She is starting to train for a half-marathon.  Anxiety has also improved. She states that she no longer feels that she has \"90 tabs\" open in her brain.  Nervous about meeting her boyfriend's mom is coming and going to stay for a month in June. We may be scheduling some appointments for therapy during this time as an outlet for anxiety. Patient has been taking Adderall XR daily and is tolerating it well.  Denies depressed mood.  Denies suicidal ideation.  Denies AVH.  We will continue to monitor for mood, behavior, and safety.  Continue Adderall XR 10 mg daily  Follow-up 6 weeks     Record Review is below for 03/21/2023 : I have thoroughly reviewed the patient's electronic medical record to include previous encounters, care everywhere, notes, medications, labs, EDU and UDS (if applicable), imaging, and EKG's.  Pertinent information is included in this note.  1/4/2023 TSH and CBC are both reassuring and within normal limits, ALT is elevated at 46, however remaining CMP is reassuring, no other labs, imaging, procedures, EKGs in record.   EKG Results: None in record  02/20/2023Amiguel angel Glez is a 27 y.o. female who presents today for follow up for ADHD and anxiety.  Patient reports that her focus and concentration have greatly improved with the initiation of Adderall for treatment of her ADHD.  She described taking the medication and felt like it made her mind more \"quiet.\"  Patient says that she is already feeling like she can complete tasks more appropriately and feels more organized.  She does feel like she could use a little bit more help as she is going to be starting a new position on [employer].  Denies SI/HI/AVH.  Denies depressed mood.  PHQ-9 is 2 and YOLA-7 is 0.   Increase Adderall XR to 10 mg " daily  Patient will return Adderall XR 5 mg to pharmacy  Follow-up 1 month    Record Review is below for 02/20/2023 : I have thoroughly reviewed the patient's electronic medical record to include previous encounters, care everywhere, notes, medications, labs, EDU and UDS (if applicable), imaging, and EKG's.  Pertinent information is included in this note.  1/4/2023 TSH and CBC are both reassuring and within normal limits, ALT is elevated at 46, however remaining CMP is reassuring, no other labs, imaging, procedures, EKGs in record.     Letty Glez is a 27 y.o. female who presents today for follow up for ADHD, and anxiety. Patient is still having issues with focus and concentration. She has a compelling childhood ADHD assessment however was never referred or officially tested. She also has a compelling adult presentation of ADHD symptoms.  We have been having difficulty finding an opening for neuropsychological testing. Denies depressed mood, does have symptoms of anxiety.  She states that this week has been even worse for her.  She is losing things, having irritability, and has also found that she sometimes purchases things that she already has because she forgot that she purchased them.  She is ultimately concerned about getting the symptoms under control because she is about to start a new job.  We may need to begin ADHD medication in order to not delay treatment any further for her.  We discussed the need for satisfactory urine drug screen and sign controlled substances agreement.  Denies SI/HI/AVH. PHQ-9, 4 YOLA-7, 4 and the PHQ-9 is congruent with assessment and presentation, however the YOLA-7 presents as somewhat incongruent with her anxiety symptoms.    UDS is satisfactory  [facility] (01/30/2023 14:53)  EDU is satisfactory  MICROFILM RECORDS - SCAN - MANUAL [place] EDU REPORT 01/01/2023-01/24/2023 (01/26/2023)  SANTIAGO  MICROFILM RECORDS - SCAN - KAVEH SIGNED [facility] CONTROLLED SUB CONTRACT (01/30/2023)  Start Adderall XR 5 mg  Follow-up 3 weeks      Record Review is below for 01/30/2023 : I have thoroughly reviewed the patient's electronic medical record to include previous encounters, care everywhere, notes, medications, labs, EDU and UDS (if applicable), imaging, and EKG's.  Pertinent information is included in this note.  1/4/2023 TSH and CBC are both reassuring and within normal limits, ALT is elevated at 46, however remaining CMP is reassuring, no other labs, imaging, procedures, EKGs in record.      1/23/2023 Letty Glez is a 27 y.o. female who presents today for follow up For ADHD, and anxiety. Patient is still having issues with focus and concentration, with hyperactivity. She has a compelling childhood ADHD assessment however was never referred or officially tested. She also has a compelling adult presentation of ADHD symptoms. Denies depressed mood, does have symptoms of anxiety.  Denies SI/HI/AVH. PHQ-9, 4 YOLA-7, 5 and the PHQ-9 is congruent with assessment and presentation, however the YOLA-7 presents as somewhat incongruent with her anxiety symptoms.  We discussed the possibility of starting Wellbutrin to target ADHD symptoms.  Patient has had extremely negative experience with medications in the past.  She is hesitant to begin a medication while also starting a new job and would like to wait to confirm ADHD diagnosis.      Record Review is below for 01/23/2023 : I have thoroughly reviewed the patient's electronic medical record to include previous encounters, care everywhere, notes, medications, labs, EDU and UDS (if applicable), imaging, and EKG's.  Pertinent information is included in this note.  TSH and CBC are both reassuring and within normal limits, ALT is elevated at 46, however remaining CMP is reassuring, no other labs, imaging, procedures, EKGs in record.  No records for 6-month date range in EDU.    1/9/2023 I am referring her to   Malinda, PhD for testing to confirm diagnosis  I expect that she will have a positive diagnosis of ADHD  I would also expect that she will have a satisfactory UDS  Follow-up 1 month  Presentation seems most consistent with generalized anxiety disorder and ADHD DSM-5 criteria.  No medication orders at this time as patient does not wish to start medication and wishes to confirm the diagnosis with testing.  We discussed that treatment of her ADHD could potentially help eliminate her anxiety while increasing concentration and focus.  Will refer to Dr. Ptety, PhD for testing to confirm diagnosis of ADHD.  I would expect that she will have a satisfactory UDS and sign a CSA should we consider controlled substances and treatment.  I extensively discussed this with the patient. Patient verbalized understanding and is agreeable to this plan.  Addressed all questions and concerns.  Continue psychotherapeutic modalities as indicated.    Psychiatric/Behavioral Health History  Began Treatment: 2019  Diagnoses: Anxiety  Psychiatrist: Denies, Family dr ordered medicine for anxiety (Celexa) but she did not take it  Therapist: Denies  Admission History: Denies  Medication Trials: buspar, hydroxyzine  Self Harm: Denies  Suicide Attempts: Denies  Trauma: Denies         Labs:  WBC   Date Value Ref Range Status   01/04/2023 5.86 3.40 - 10.80 10*3/mm3 Final     Platelets   Date Value Ref Range Status   01/04/2023 227 140 - 450 10*3/mm3 Final     Hemoglobin   Date Value Ref Range Status   01/04/2023 14.1 12.0 - 15.9 g/dL Final     Hematocrit   Date Value Ref Range Status   01/04/2023 40.9 34.0 - 46.6 % Final     Glucose   Date Value Ref Range Status   01/04/2023 86 65 - 99 mg/dL Final     Creatinine   Date Value Ref Range Status   01/04/2023 0.74 0.57 - 1.00 mg/dL Final     ALT (SGPT)   Date Value Ref Range Status   01/04/2023 46 (H) 1 - 33 U/L Final     AST (SGOT)   Date Value Ref Range Status   01/04/2023 31 1 - 32 U/L Final     BUN    Date Value Ref Range Status   01/04/2023 13 6 - 20 mg/dL Final     eGFR   Date Value Ref Range Status   01/04/2023 113.2 >60.0 mL/min/1.73 Final     Comment:     National Kidney Foundation and American Society of Nephrology (ASN) Task Force recommended calculation based on the Chronic Kidney Disease Epidemiology Collaboration (CKD-EPI) equation refit without adjustment for race.     TSH   Date Value Ref Range Status   01/04/2023 1.070 0.270 - 4.200 uIU/mL Final      Pain Management Panel         Latest Ref Rng & Units 1/30/2023   Pain Management Panel   Amphetamine, Urine Qual Negative Negative     Barbiturates Screen, Urine Negative Negative     Benzodiazepine Screen, Urine Negative Negative     Buprenorphine, Screen, Urine Negative Negative     Cocaine Screen, Urine Negative Negative     Methadone Screen , Urine Negative Negative     Methamphetamine, Ur Negative Negative                   Imaging Results:  No Images in the past 120 days found..    Current Medications:   Current Outpatient Medications   Medication Sig Dispense Refill   • ALBUTEROL IN      • amphetamine-dextroamphetamine XR (Adderall XR) 10 MG 24 hr capsule Take 1 capsule by mouth Daily 30 capsule 0   • cetirizine (zyrTEC) 10 MG tablet Take 1 tablet by mouth Daily.     • COLLAGEN PO Take  by mouth. OTC     • dicyclomine (BENTYL) 10 MG capsule Take 1 capsule by mouth 3 (Three) Times a Day As Needed (loose stools/ IBS). 60 capsule 2   • Ferrous Sulfate (IRON PO) Take  by mouth. OTC     • multivitamin with minerals tablet tablet Take 1 tablet by mouth Daily.     • sodium chloride 0.9 % nebulizer solution 0.82 mL with albuterol (5 MG/ML) 0.5% nebulizer solution 0.9 mg Take 15 mg/hr by nebulization Continuous.       No current facility-administered medications for this visit.       Problem List:  Patient Active Problem List   Diagnosis   • Concentration deficit   • Diarrhea   • Thyroid disorder screen   • Irritable bowel syndrome with diarrhea        Allergy:   Allergies   Allergen Reactions   • Mixed Feathers Other (See Comments)     Told as child to not take (CHICKEN FEATHERS)   • Codeine Nausea And Vomiting   • Haemophilus Influenzae Vaccines Other (See Comments)     ALLERGIC TO CHICKEN FEATHERS AND CHICKEN BASED INGREDIENTS         Discontinued Medications:  There are no discontinued medications.    Mental Status Exam:   Appearance: good eye contact, normal street clothes, groomed, sitting in chair   Behavior: pleasant and cooperative  Motor: no abnormal  Speech: normal rhythm, rate, volume, tone, not hyperverbal, not pressured, normal prosidy  Mood: Euthymic  Affect: Appropriate  Thought Content: negative suicidal ideations, negative homicidal ideations, negative obsessions  Perceptions: negative auditory hallucinations, negative visual hallucinations, negative delusions, negative paranoia  Thought Process: goal directed, linear  Insight/Judgement: fair/fair  Cognition: grossly intact  Attention: intact  Orientation: person, place, time and situation  Memory: intact    Review of Systems:   Constitutional: Denies fatigue, night sweats  Eyes: Denies double vision, blurred vision  HENT: Denies vertigo, recent head injury  Cardiovascular: Denies chest pain, irregular heartbeats  Respiratory: Denies productive cough, shortness of breath  Gastrointestinal: Denies nausea, vomiting  Genitourinary: Denies dysuria, urinary retention  Integument: Denies hair growth change, new skin lesions  Neurologic: Denies altered mental status, seizures  Musculoskeletal: Denies joint swelling, limitation of motion  Endocrine: Denies cold intolerance, heat intolerance  Psychiatric: See mental status exam  Allergic-immunologic: Denies frequent illnesses    Psychotherapy:     23 minutes of supportive psychotherapy with goal to strengthen defenses, promote problems solving, restore adaptive functioning and provide symptom relief. The therapeutic alliance was strengthened to  encourage the patient to express their thoughts and feelings. Esteem building was enhanced through praise, reassurance, normalizing and encouragement. Coping skills were enhanced to build distress tolerance skills and emotional regulation. Allowed patient to freely discuss issues without interruption or judgement with unconditional positive regard, active listening skills, and empathy. Provided a safe, confidential environment to facilitate the development of a positive therapeutic relationship and encourage open, honest communication. Assisted patient in identifying risk factors which would indicate the need for higher level of care including thoughts to harm self or others and/or self-harming behavior and encouraged patient to contact this office, call 911, or present to the nearest emergency room should any of these events occur. Assisted patient in processing session content; acknowledged and normalized patient’s thoughts, feelings, and concerns by utilizing a person-centered approach in efforts to build appropriate rapport and a positive therapeutic relationship with open and honest communication. Patient given education on medication side effects, diagnosis/illness and relapse symptoms. Plan to continue supportive psychotherapy in next appointment to provide symptom relief.          PLAN:   Presentation seems most consistent with DSM-V criteria for:  Diagnoses and all orders for this visit:    1. Attention deficit hyperactivity disorder (ADHD), predominantly inattentive type (Primary)  -     Ambulatory Referral to Psychology    2. Generalized anxiety disorder  -     Ambulatory Referral to Psychology    3. Concentration deficit         Continue Adderall XR 10 mg daily  Ambulatory referral for psychology.  Referral for evaluation, treatment, and individual psychotherapy to Emory Guajardo, PhD  [phone]  Follow-up 6 weeks  Discussed medication options and treatment plan of prescribed medication as well as  the risks, benefits, and side effects.  Patient verbalized understanding and is agreeable to this plan.   Patient is agreeable to call the office with any worsening of symptoms or onset of side effects.   Patient is agreeable to call 911 or go to the nearest ER should he/she begin having SI/HI.   Addressed all questions and concerns.    Continue psychotherapeutic modalities as indicated.    TREATMENT PLAN/GOALS:  Treatment plan: Continue supportive psychotherapy efforts and medications as indicated. Continue to challenge patterns of living conducive to pathology, strengthen defenses, promote problems solving, restore adaptive functioning and provide symptom relief. Treatment and medication options discussed during today's visit. Patient acknowledged and verbally consented to continue with current treatment plan and was educated on the importance of compliance with treatment and follow-up appointments.  Functional status:Good  Prognosis: Good  Progress: Continued improvement    1. Safety: No acute safety concerns.   2. Therapy: Will continue therapy at future visits.  3. Risk Assessment: Risk of self-harm acutely and chronically is low.  Risk factors include anxiety disorder, mood disorder, and recent psychosocial stressors. Protective factors include no family history, denies access to guns/weapons, no present SI, no history of suicide attempts or self-harm in the past, minimal AODA, healthcare seeking, future orientation, willingness to engage in care.  Risk assessment could be further elevated in the event of treatment noncompliance and/or AODA.  4. Labs/studies: No labs/studies ordered at this time  5. Medications:    Medication Education:   ADDERALL (AMPHETAMINE) Risks, benefits, side effects discussed with patient including elevated heart rate, elevated blood pressure, irritability, insomnia, sexual dysfunction, appetite suppressing properties, psychosis.  After discussion of these risks and benefits, the  patient voiced understanding and agreed to proceed. Edu reviewed, UDS ordered, and controlled substance agreement signed & witnessed.    EDU reviewed as expected.  6. Follow-up: Return in about 6 weeks (around 6/13/2023) for Recheck, Next scheduled follow up.         This document has been electronically signed by VALENTIN Momin  May 5, 2023 15:38 EDT    Please note that portions of this note were completed with a voice recognition program.  Copied text in this note has been reviewed and is accurate as of 05/05/23

## 2023-05-02 NOTE — PATIENT INSTRUCTIONS
1.  Please return to clinic at your next scheduled visit.  Please contact the clinic ([phone]) at least 24 hours prior in the event you need to cancel.  2.  Do no harm to yourself or others.    3.  Avoid alcohol and drugs.    4.  Take all medications as prescribed.  Please contact the clinic with any concerns. If you are in need of medication refills, please call the clinic at [phone].    5. Should you want to get in touch with your provider, VALENTIN Momin, please contact the office ([phone]), and staff will be able to page Ginger directly.  6. In the event you have personal crisis, contact the following crisis numbers: Suicide Prevention Hotline 1-662.342.8662; [facility] Helpline 9-812-176-XRFG; [facility] Emergency Room [phone]; text HELLO to 059441; or 741.     SPECIFIC RECOMMENDATIONS:     1.      Medications discussed at this encounter:                     2.      Psychotherapy recommendations: We will continue therapy at future visits.     3.     Return to clinic:  6 Weeks

## 2023-05-24 DIAGNOSIS — F90.0 ADHD (ATTENTION DEFICIT HYPERACTIVITY DISORDER), INATTENTIVE TYPE: ICD-10-CM

## 2023-05-24 RX ORDER — DEXTROAMPHETAMINE SACCHARATE, AMPHETAMINE ASPARTATE MONOHYDRATE, DEXTROAMPHETAMINE SULFATE AND AMPHETAMINE SULFATE 2.5; 2.5; 2.5; 2.5 MG/1; MG/1; MG/1; MG/1
10 CAPSULE, EXTENDED RELEASE ORAL DAILY
Qty: 30 CAPSULE | Refills: 0 | Status: SHIPPED | OUTPATIENT
Start: 2023-05-25

## 2023-05-24 NOTE — TELEPHONE ENCOUNTER
Patient needs a refill.  Order pended  PATIENT DUE FOR A CONTROLLED SUBSTANCE CONTRACT.  REMINDER ADDED TO SPECIALTY COMMENTS

## 2023-05-24 NOTE — TELEPHONE ENCOUNTER
Refill request    Adderall XR 10 mg by mouth daily    UDS satisfactory  POC Urine Drug Screen [facility] (01/30/2023 14:53)  CSA in record    Please review EDU

## 2023-06-12 NOTE — PATIENT INSTRUCTIONS
1.  Please return to clinic at your next scheduled visit.  Please contact the clinic ([phone]) at least 24 hours prior in the event you need to cancel.  2.  Do no harm to yourself or others.    3.  Avoid alcohol and drugs.    4.  Take all medications as prescribed.  Please contact the clinic with any concerns. If you are in need of medication refills, please call the clinic at [phone].    5. Should you want to get in touch with your provider, VALENTIN Momin, please contact the office ([phone]), and staff will be able to page Ginger directly.  6. In the event you have personal crisis, contact the following crisis numbers: Suicide Prevention Hotline 1-971.202.8370; [facility] Helpline 1-029-493-QFYP; [facility] Emergency Room [phone]; text HELLO to 234912; or 529.     SPECIFIC RECOMMENDATIONS:     1.      Medications discussed at this encounter: ADDERALL (AMPHETAMINE) Risks, benefits, side effects discussed with patient including elevated heart rate, elevated blood pressure, irritability, insomnia, sexual dysfunction, appetite suppressing properties, psychosis.  After discussion of these risks and benefits, the patient voiced understanding and agreed to proceed. Abdon reviewed, UDS ordered, and controlled substance agreement signed & witnessed.                      2.      Psychotherapy recommendations: We will continue therapy at future visits.     3.     Return to clinic:  1 month

## 2023-06-12 NOTE — PROGRESS NOTES
"[facility]/Psychiatry  Medication Management Follow-up    Referring Provider:  Olesya Mcmahon, APRN  [address]  [address]  [place] [address]    Vital Signs:   /80   Pulse 73   Ht 157.5 cm (62\")   Wt 73.9 kg (163 lb)   BMI 29.81 kg/m²     Chief Complaint: ADHD. Anxiety.    History of Present Illness:   Letty Glez is a 27 y.o. female who presents today for follow-up and medication management for:    ICD-10-CM ICD-9-CM   1. Attention deficit hyperactivity disorder (ADHD), predominantly inattentive type  F90.0 314.00   2. Generalized anxiety disorder  F41.1 300.02       06/13/2023 Patient is taking medications as prescribed and is tolerating them well. Work is really good. She is under extreme stress because there are 6 people in her house right now and they also brought their dog with them. He boyfriend has recently proposed and she accepted, however, she is somewhat ambivalent about what she wants to do for their future. She does feel like her anxiety and ADHD symptoms are well controlled with Adderall XR.  Denies suicidal ideation. Denies AVH. We will continue to monitor for mood, behavior, and safety.    Record Review is below for 06/13/2023 : I have thoroughly reviewed the patient's electronic medical record to include previous encounters, care everywhere, notes, medications, labs, EDU and UDS (if applicable), imaging, and EKG's.  Pertinent information is included in this note.  1/4/2023 TSH and CBC are both reassuring and within normal limits, ALT is elevated at 46, however remaining CMP is reassuring, no other labs, imaging, procedures, EKGs in record.   EKG Results: None in record    05/02/2023 Patient is taking medications as prescribed and is tolerating them well. \"I am good\"  \"I am doing really good.\" She is enjoying her new schedule and she has more energy.  Her mood has improved. Sleep is good, she is feeling more rested. She is able to wake up and get ready for " "work. Her new hours are a lot better in this new position. Denies suicidal ideation. Denies AVH. We will continue to monitor for mood, behavior, and safety.  Continue Adderall XR 10 mg daily  Ambulatory referral for psychology.  Referral for evaluation, treatment, and individual psychotherapy to Emory Guajardo, PhD  [phone]  Follow-up 6 weeks    Record Review is below for 05/02/2023 : I have thoroughly reviewed the patient's electronic medical record to include previous encounters, care everywhere, notes, medications, labs, EDU and UDS (if applicable), imaging, and EKG's.  Pertinent information is included in this note.  1/4/2023 TSH and CBC are both reassuring and within normal limits, ALT is elevated at 46, however remaining CMP is reassuring, no other labs, imaging, procedures, EKGs in record.   EKG Results: None in record    03/21/2023Amiguel angel Glez is a 27 y.o. female who presents today for follow up for ADHD and anxiety.   Letty started her new job, things are going really well. She thinks this job is maybe going to be more stress free.  She is able to able to get dressed up and put on her make-up and this is helping her feel much better. Patient states the increased dose of Adderall XR 10mg is definitely improving her ADHD symptoms. She feels like she in not \"waning\" at the end of the day. Focus, concentration, and energy levels are improved. She is starting to train for a half-marathon.  Anxiety has also improved. She states that she no longer feels that she has \"90 tabs\" open in her brain.  Nervous about meeting her boyfriend's mom is coming and going to stay for a month in June. We may be scheduling some appointments for therapy during this time as an outlet for anxiety. Patient has been taking Adderall XR daily and is tolerating it well.  Denies depressed mood.  Denies suicidal ideation.  Denies AVH.  We will continue to monitor for mood, behavior, and safety.  Continue Adderall XR 10 mg " "daily  Follow-up 6 weeks     Record Review is below for 03/21/2023 : I have thoroughly reviewed the patient's electronic medical record to include previous encounters, care everywhere, notes, medications, labs, EDU and UDS (if applicable), imaging, and EKG's.  Pertinent information is included in this note.  1/4/2023 TSH and CBC are both reassuring and within normal limits, ALT is elevated at 46, however remaining CMP is reassuring, no other labs, imaging, procedures, EKGs in record.   EKG Results: None in record  02/20/2023Amiguel angel Glez is a 27 y.o. female who presents today for follow up for ADHD and anxiety.  Patient reports that her focus and concentration have greatly improved with the initiation of Adderall for treatment of her ADHD.  She described taking the medication and felt like it made her mind more \"quiet.\"  Patient says that she is already feeling like she can complete tasks more appropriately and feels more organized.  She does feel like she could use a little bit more help as she is going to be starting a new position on [employer].  Denies SI/HI/AVH.  Denies depressed mood.  PHQ-9 is 2 and YOLA-7 is 0.   Increase Adderall XR to 10 mg daily  Patient will return Adderall XR 5 mg to pharmacy  Follow-up 1 month    Record Review is below for 02/20/2023 : I have thoroughly reviewed the patient's electronic medical record to include previous encounters, care everywhere, notes, medications, labs, EDU and UDS (if applicable), imaging, and EKG's.  Pertinent information is included in this note.  1/4/2023 TSH and CBC are both reassuring and within normal limits, ALT is elevated at 46, however remaining CMP is reassuring, no other labs, imaging, procedures, EKGs in record.     Letty Glez is a 27 y.o. female who presents today for follow up for ADHD, and anxiety. Patient is still having issues with focus and concentration. She has a compelling childhood ADHD assessment however was never referred or " officially tested. She also has a compelling adult presentation of ADHD symptoms.  We have been having difficulty finding an opening for neuropsychological testing. Denies depressed mood, does have symptoms of anxiety.  She states that this week has been even worse for her.  She is losing things, having irritability, and has also found that she sometimes purchases things that she already has because she forgot that she purchased them.  She is ultimately concerned about getting the symptoms under control because she is about to start a new job.  We may need to begin ADHD medication in order to not delay treatment any further for her.  We discussed the need for satisfactory urine drug screen and sign controlled substances agreement.  Denies SI/HI/AVH. PHQ-9, 4 YOLA-7, 4 and the PHQ-9 is congruent with assessment and presentation, however the YOLA-7 presents as somewhat incongruent with her anxiety symptoms.    UDS is satisfactory  POC Urine Drug Screen [facility] (01/30/2023 14:53)  EDU is satisfactory  MICROFILM RECORDS - SCAN - MANUAL [place] EDU REPORT 01/01/2023-01/24/2023 (01/26/2023)  CSA  MICROFILM RECORDS - SCAN - [facility] SIGNED [facility] CONTROLLED SUB CONTRACT (01/30/2023)  Start Adderall XR 5 mg  Follow-up 3 weeks    Record Review is below for 01/30/2023 : I have thoroughly reviewed the patient's electronic medical record to include previous encounters, care everywhere, notes, medications, labs, EDU and UDS (if applicable), imaging, and EKG's.  Pertinent information is included in this note.  1/4/2023 TSH and CBC are both reassuring and within normal limits, ALT is elevated at 46, however remaining CMP is reassuring, no other labs, imaging, procedures, EKGs in record.      1/23/2023 Letty Glez is a 27 y.o. female who presents today for follow up For ADHD, and anxiety. Patient is still having issues with focus and concentration, with hyperactivity. She has a compelling childhood ADHD  assessment however was never referred or officially tested. She also has a compelling adult presentation of ADHD symptoms. Denies depressed mood, does have symptoms of anxiety.  Denies SI/HI/AVH. PHQ-9, 4 YOLA-7, 5 and the PHQ-9 is congruent with assessment and presentation, however the YOLA-7 presents as somewhat incongruent with her anxiety symptoms.  We discussed the possibility of starting Wellbutrin to target ADHD symptoms.  Patient has had extremely negative experience with medications in the past.  She is hesitant to begin a medication while also starting a new job and would like to wait to confirm ADHD diagnosis.      Record Review is below for 01/23/2023 : I have thoroughly reviewed the patient's electronic medical record to include previous encounters, care everywhere, notes, medications, labs, EDU and UDS (if applicable), imaging, and EKG's.  Pertinent information is included in this note.  TSH and CBC are both reassuring and within normal limits, ALT is elevated at 46, however remaining CMP is reassuring, no other labs, imaging, procedures, EKGs in record.  No records for 6-month date range in EDU.    1/9/2023 I am referring her to Dr. Petty, PhD for testing to confirm diagnosis  I expect that she will have a positive diagnosis of ADHD  I would also expect that she will have a satisfactory UDS  Follow-up 1 month  Presentation seems most consistent with generalized anxiety disorder and ADHD DSM-5 criteria.  No medication orders at this time as patient does not wish to start medication and wishes to confirm the diagnosis with testing.  We discussed that treatment of her ADHD could potentially help eliminate her anxiety while increasing concentration and focus.  Will refer to Dr. Petty, PhD for testing to confirm diagnosis of ADHD.  I would expect that she will have a satisfactory UDS and sign a CSA should we consider controlled substances and treatment.  I extensively discussed this with the patient.  Patient verbalized understanding and is agreeable to this plan.  Addressed all questions and concerns.  Continue psychotherapeutic modalities as indicated.    Psychiatric/Behavioral Health History  Began Treatment: 2019  Diagnoses: Anxiety  Psychiatrist: Denies, Family dr ordered medicine for anxiety (Celexa) but she did not take it  Therapist: Denies  Admission History: Denies  Medication Trials: buspar, hydroxyzine  Self Harm: Denies  Suicide Attempts: Denies  Trauma: Denies     Labs:  WBC   Date Value Ref Range Status   01/04/2023 5.86 3.40 - 10.80 10*3/mm3 Final     Platelets   Date Value Ref Range Status   01/04/2023 227 140 - 450 10*3/mm3 Final     Hemoglobin   Date Value Ref Range Status   01/04/2023 14.1 12.0 - 15.9 g/dL Final     Hematocrit   Date Value Ref Range Status   01/04/2023 40.9 34.0 - 46.6 % Final     Glucose   Date Value Ref Range Status   01/04/2023 86 65 - 99 mg/dL Final     Creatinine   Date Value Ref Range Status   01/04/2023 0.74 0.57 - 1.00 mg/dL Final     ALT (SGPT)   Date Value Ref Range Status   01/04/2023 46 (H) 1 - 33 U/L Final     AST (SGOT)   Date Value Ref Range Status   01/04/2023 31 1 - 32 U/L Final     BUN   Date Value Ref Range Status   01/04/2023 13 6 - 20 mg/dL Final     eGFR   Date Value Ref Range Status   01/04/2023 113.2 >60.0 mL/min/1.73 Final     Comment:     National Kidney Foundation and American Society of Nephrology (ASN) Task Force recommended calculation based on the Chronic Kidney Disease Epidemiology Collaboration (CKD-EPI) equation refit without adjustment for race.     TSH   Date Value Ref Range Status   01/04/2023 1.070 0.270 - 4.200 uIU/mL Final      Pain Management Panel          Latest Ref Rng & Units 1/30/2023   Pain Management Panel   Amphetamine, Urine Qual Negative Negative    Barbiturates Screen, Urine Negative Negative    Benzodiazepine Screen, Urine Negative Negative    Buprenorphine, Screen, Urine Negative Negative    Cocaine Screen, Urine Negative  Negative    Methadone Screen , Urine Negative Negative    Methamphetamine, Ur Negative Negative         Imaging Results:  No Images in the past 120 days found..    Current Medications:   Current Outpatient Medications   Medication Sig Dispense Refill    amphetamine-dextroamphetamine XR (Adderall XR) 10 MG 24 hr capsule Take 1 capsule by mouth Daily 30 capsule 0    cetirizine (zyrTEC) 10 MG tablet Take 1 tablet by mouth Daily.      multivitamin with minerals tablet tablet Take 1 tablet by mouth Daily.      ALBUTEROL IN  (Patient not taking: Reported on 6/13/2023)      COLLAGEN PO Take  by mouth. OTC (Patient not taking: Reported on 6/13/2023)      dicyclomine (BENTYL) 10 MG capsule Take 1 capsule by mouth 3 (Three) Times a Day As Needed (loose stools/ IBS). (Patient not taking: Reported on 6/13/2023) 60 capsule 2    Ferrous Sulfate (IRON PO) Take  by mouth. OTC (Patient not taking: Reported on 6/13/2023)      hydrOXYzine (ATARAX) 25 MG tablet Take 1 tablet by mouth 2 (Two) Times a Day As Needed for Anxiety. 60 tablet 1    sodium chloride 0.9 % nebulizer solution 0.82 mL with albuterol (5 MG/ML) 0.5% nebulizer solution 0.9 mg Take 15 mg/hr by nebulization Continuous. (Patient not taking: Reported on 6/13/2023)       No current facility-administered medications for this visit.       Problem List:  Patient Active Problem List   Diagnosis    Concentration deficit    Diarrhea    Thyroid disorder screen    Irritable bowel syndrome with diarrhea       Allergy:   Allergies   Allergen Reactions    Mixed Feathers Other (See Comments)     Told as child to not take (CHICKEN FEATHERS)    Codeine Nausea And Vomiting    Haemophilus Influenzae Vaccines Other (See Comments)     ALLERGIC TO CHICKEN FEATHERS AND CHICKEN BASED INGREDIENTS         Discontinued Medications:  There are no discontinued medications.    Mental Status Exam:   Appearance: good eye contact, normal street clothes, groomed, sitting in chair   Behavior: pleasant  and cooperative, patient became tearful during interview  Motor: no abnormal  Speech: normal rhythm, rate, volume, tone, not hyperverbal, not pressured, normal prosidy  Mood: Euthymic  Affect: Appropriate  Thought Content: negative suicidal ideations, negative homicidal ideations, negative obsessions  Perceptions: negative auditory hallucinations, negative visual hallucinations, negative delusions, negative paranoia  Thought Process: goal directed, linear  Insight/Judgement: fair/fair  Cognition: grossly intact  Attention: intact  Orientation: person, place, time and situation  Memory: intact    Review of Systems:   Constitutional: Denies fatigue, night sweats  Eyes: Denies double vision, blurred vision  HENT: Denies vertigo, recent head injury  Cardiovascular: Denies chest pain, irregular heartbeats  Respiratory: Denies productive cough, shortness of breath  Gastrointestinal: Denies nausea, vomiting  Genitourinary: Denies dysuria, urinary retention  Integument: Denies hair growth change, new skin lesions  Neurologic: Denies altered mental status, seizures  Musculoskeletal: Denies joint swelling, limitation of motion  Endocrine: Denies cold intolerance, heat intolerance  Psychiatric: See mental status exam  Allergic-immunologic: Denies frequent illnesses    Psychotherapy:     21 minutes of supportive psychotherapy with goal to strengthen defenses, promote problems solving, restore adaptive functioning and provide symptom relief. The therapeutic alliance was strengthened to encourage the patient to express their thoughts and feelings. Esteem building was enhanced through praise, reassurance, normalizing and encouragement. Coping skills were enhanced to build distress tolerance skills and emotional regulation. Allowed patient to freely discuss issues without interruption or judgement with unconditional positive regard, active listening skills, and empathy. Provided a safe, confidential environment to facilitate the  development of a positive therapeutic relationship and encourage open, honest communication. Assisted patient in identifying risk factors which would indicate the need for higher level of care including thoughts to harm self or others and/or self-harming behavior and encouraged patient to contact this office, call 911, or present to the nearest emergency room should any of these events occur. Assisted patient in processing session content; acknowledged and normalized patient’s thoughts, feelings, and concerns by utilizing a person-centered approach in efforts to build appropriate rapport and a positive therapeutic relationship with open and honest communication. Patient given education on medication side effects, diagnosis/illness and relapse symptoms. Plan to continue supportive psychotherapy in next appointment to provide symptom relief.      PLAN:   Presentation seems most consistent with DSM-V criteria for:  Diagnoses and all orders for this visit:    1. Attention deficit hyperactivity disorder (ADHD), predominantly inattentive type (Primary)    2. Generalized anxiety disorder  -     hydrOXYzine (ATARAX) 25 MG tablet; Take 1 tablet by mouth 2 (Two) Times a Day As Needed for Anxiety.  Dispense: 60 tablet; Refill: 1         Continue Adderall XR 10 mg daily  Follow-up 1 month  Discussed medication options and treatment plan of prescribed medication as well as the risks, benefits, and side effects.  Patient verbalized understanding and is agreeable to this plan.   Patient is agreeable to call the office with any worsening of symptoms or onset of side effects.   Patient is agreeable to call 911 or go to the nearest ER should he/she begin having SI/HI.   Addressed all questions and concerns.    Continue psychotherapeutic modalities as indicated.    TREATMENT PLAN/GOALS:  Treatment plan: Continue supportive psychotherapy efforts and medications as indicated. Continue to challenge patterns of living conducive to  pathology, strengthen defenses, promote problems solving, restore adaptive functioning and provide symptom relief. Treatment and medication options discussed during today's visit. Patient acknowledged and verbally consented to continue with current treatment plan and was educated on the importance of compliance with treatment and follow-up appointments.  Functional status:Good  Prognosis: Good  Progress: Continued improvement    Safety: No acute safety concerns.   Therapy: Will continue therapy at future visits.  Risk Assessment: Risk of self-harm acutely and chronically is moderate.  Risk factors include anxiety disorder, mood disorder, and recent psychosocial stressors. Protective factors include no family history, denies access to guns/weapons, no present SI, no history of suicide attempts or self-harm in the past, minimal AODA, healthcare seeking, future orientation, willingness to engage in care.  Risk assessment could be further elevated in the event of treatment noncompliance and/or AODA.  Labs/studies: No labs/studies ordered at this time  Medications:   New Medications Ordered This Visit   Medications    hydrOXYzine (ATARAX) 25 MG tablet     Sig: Take 1 tablet by mouth 2 (Two) Times a Day As Needed for Anxiety.     Dispense:  60 tablet     Refill:  1      Medication Education:   ADDERALL (AMPHETAMINE) Risks, benefits, side effects discussed with patient including elevated heart rate, elevated blood pressure, irritability, insomnia, sexual dysfunction, appetite suppressing properties, psychosis.  After discussion of these risks and benefits, the patient voiced understanding and agreed to proceed. [place]don reviewed, UDS ordered, and controlled substance agreement signed & witnessed.      Follow-up: Return in about 1 month (around 7/13/2023) for Recheck, Next scheduled follow up.         This document has been electronically signed by VALENTIN Momin  June 16, 2023 17:26 EDT    Please note that portions of  this note were completed with a voice recognition program.  Copied text in this note has been reviewed and is accurate as of 06/16/23

## 2023-06-13 ENCOUNTER — OFFICE VISIT (OUTPATIENT)
Dept: BEHAVIORAL HEALTH | Facility: CLINIC | Age: 28
End: 2023-06-13
Payer: COMMERCIAL

## 2023-06-13 VITALS
SYSTOLIC BLOOD PRESSURE: 116 MMHG | HEART RATE: 73 BPM | DIASTOLIC BLOOD PRESSURE: 80 MMHG | HEIGHT: 62 IN | BODY MASS INDEX: 30 KG/M2 | WEIGHT: 163 LBS

## 2023-06-13 DIAGNOSIS — F41.1 GENERALIZED ANXIETY DISORDER: ICD-10-CM

## 2023-06-13 DIAGNOSIS — F90.0 ATTENTION DEFICIT HYPERACTIVITY DISORDER (ADHD), PREDOMINANTLY INATTENTIVE TYPE: Primary | ICD-10-CM

## 2023-06-13 RX ORDER — HYDROXYZINE HYDROCHLORIDE 25 MG/1
25 TABLET, FILM COATED ORAL 2 TIMES DAILY PRN
Qty: 60 TABLET | Refills: 1 | Status: SHIPPED | OUTPATIENT
Start: 2023-06-13

## 2023-07-27 ENCOUNTER — TELEPHONE (OUTPATIENT)
Dept: FAMILY MEDICINE CLINIC | Age: 28
End: 2023-07-27

## 2023-07-27 NOTE — TELEPHONE ENCOUNTER
Caller: Letty Glez    Relationship: Self    Best call back number: [phone]    What is the medical concern/diagnosis: N/A    What specialty or service is being requested: OB/GYN    What is the provider, practice or medical service name: N/A    What is the office location: N/A    What is the office phone number: N/A    Any additional details: PATIENT IS NOT FAMILIAR WITH PROVIDERS IN THIS AREA. SHE NEEDS HER YEARLY OB/GYN EXAM AND WOULD LIKE TO BE REFERRED TO A GOOD PROVIDER. PLEASE CALL PATIENT BACK AND SCHEDULE/ADVISE.

## 2023-08-23 ENCOUNTER — TELEPHONE (OUTPATIENT)
Dept: PSYCHIATRY | Facility: CLINIC | Age: 28
End: 2023-08-23
Payer: COMMERCIAL

## 2023-08-23 ENCOUNTER — OFFICE VISIT (OUTPATIENT)
Dept: BEHAVIORAL HEALTH | Facility: CLINIC | Age: 28
End: 2023-08-23
Payer: COMMERCIAL

## 2023-08-23 VITALS
WEIGHT: 163 LBS | BODY MASS INDEX: 30 KG/M2 | HEIGHT: 62 IN | DIASTOLIC BLOOD PRESSURE: 75 MMHG | SYSTOLIC BLOOD PRESSURE: 117 MMHG | HEART RATE: 62 BPM

## 2023-08-23 DIAGNOSIS — F32.0 CURRENT MILD EPISODE OF MAJOR DEPRESSIVE DISORDER WITHOUT PRIOR EPISODE: ICD-10-CM

## 2023-08-23 DIAGNOSIS — F90.0 ATTENTION DEFICIT HYPERACTIVITY DISORDER (ADHD), PREDOMINANTLY INATTENTIVE TYPE: Primary | ICD-10-CM

## 2023-08-23 DIAGNOSIS — F41.0 PANIC ATTACKS: ICD-10-CM

## 2023-08-23 DIAGNOSIS — F41.1 GAD (GENERALIZED ANXIETY DISORDER): ICD-10-CM

## 2023-08-23 RX ORDER — GUANFACINE 1 MG/1
1 TABLET ORAL NIGHTLY
Qty: 30 TABLET | Refills: 1 | Status: SHIPPED | OUTPATIENT
Start: 2023-08-23 | End: 2023-08-23 | Stop reason: SDUPTHER

## 2023-08-23 RX ORDER — GUANFACINE 1 MG/1
1 TABLET ORAL NIGHTLY
Qty: 90 TABLET | Refills: 1 | Status: SHIPPED | OUTPATIENT
Start: 2023-08-23

## 2023-08-23 NOTE — PROGRESS NOTES
"[facility]  Medication Management Follow-up    Referring Provider:  No referring provider defined for this encounter.    Vital Signs:   /75   Pulse 62   Ht 157.5 cm (62\")   Wt 73.9 kg (163 lb)   BMI 29.81 kg/mý     Chief Complaint: ADHD. Anxiety. Panic Attacks.    History of Present Illness:   Letty Glez is a 27 y.o. female who presents today for follow-up and medication management for:    ICD-10-CM ICD-9-CM   1. Attention deficit hyperactivity disorder (ADHD), predominantly inattentive type  F90.0 314.00   2. YOLA (generalized anxiety disorder)  F41.1 300.02   3. Panic attacks  F41.0 300.01   4. Current mild episode of major depressive disorder without prior episode  F32.0 296.21       08/23/2023 Patient is taking medications as prescribed and is tolerating them well.   Feels like she has been driving the \"[employer] bus\" for a while. She has taken Xanax a couple times for panic attacks and it has helped. Has not been taking Adderall XR for about 2 weeks, when the dose was increased to 15mg she said it was \"too much\" and she also was feeling like the 10mg was maybe causing mood reactivity.   Depression  Visit Type: follow-up (Depression, YOLA, ADHD, Panic Attacks)  Patient presents with the following symptoms: anhedonia, decreased concentration, depressed mood, excessive worry, feelings of hopelessness, feelings of worthlessness, irritability, nervousness/anxiety, panic and psychomotor agitation.  Patient is not experiencing: fatigue, suicidal ideas, suicidal planning and thoughts of death.  Frequency of symptoms: most days   Severity: causing significant distress   Sleep quality: fair  Denies suicidal ideation.  Denies AVH.  We will continue to monitor for mood, behavior, and safety.    Record Review is below for 08/23/2023 : I have thoroughly reviewed the patient's electronic medical record to include previous encounters, care everywhere, notes, medications, labs, EDU and UDS " "(if applicable), imaging, and EKG's.  Pertinent information is included in this note.  1/4/2023 TSH and CBC are both reassuring and within normal limits, ALT is elevated at 46, however remaining CMP is reassuring, no other labs, imaging, procedures, EKGs in record.   EKG Results: None in record  Head Imaging:  None in record    07/19/2023 Patient is taking medications as prescribed and is tolerating them well.   She has been battling increased anxiety. This is coming in waves, some days are better than others. She is forgetting things and misplacing things. She is really frustrated with herself. She does not want to start a SSRI to target anxiety. She is having panic attacks nearly daily, sense of impending doom, her hand feels numb.   Adderall XR was effective when we first started medication, now it wears off quickly.   Denies suicidal ideation.  Denies AVH.  We will continue to monitor for mood, behavior, and safety.  Increase Adderall XR to 15mg daily  Continue propanolol 10mg bid prn for anxiety/panic attacks  Start alprazolam 0.25mg qd prn for anxiety/panic attacks qty. 10  Discontinue hydroxyzine  Follow-up 1 month    Record Review is below for 07/19/2023 : I have thoroughly reviewed the patient's electronic medical record to include previous encounters, care everywhere, notes, medications, labs, EDU and UDS (if applicable), imaging, and EKG's.  Pertinent information is included in this note.  1/4/2023 TSH and CBC are both reassuring and within normal limits, ALT is elevated at 46, however remaining CMP is reassuring, no other labs, imaging, procedures, EKGs in record.   EKG Results: None in record  Head Imaging:  None in record    7/13/2023 Telephone encounter patient is experiencing intense anxiety, she is having panic attacks despite taking hydroxyzine. She reports that she has taken buspar before and it made her feel like a \"zombie.\" We are going to discuss SSRIs at next appt for treating anxiety.    " "  Start propanolol 10mg bid prn for anxiety/panic attacks  Continue follow up appointment with me   Appointment with Ginger Thakur APRN (07/19/2023)     06/13/2023 Patient is taking medications as prescribed and is tolerating them well. Work is really good. She is under extreme stress because there are 6 people in her house right now and they also brought their dog with them. He boyfriend has recently proposed and she accepted, however, she is somewhat ambivalent about what she wants to do for their future. She does feel like her anxiety and ADHD symptoms are well controlled with Adderall XR.  Denies suicidal ideation. Denies AVH. We will continue to monitor for mood, behavior, and safety.  Continue Adderall XR 10 mg daily  Follow-up 1 month    Record Review is below for 06/13/2023 : I have thoroughly reviewed the patient's electronic medical record to include previous encounters, care everywhere, notes, medications, labs, EDU and UDS (if applicable), imaging, and EKG's.  Pertinent information is included in this note.  1/4/2023 TSH and CBC are both reassuring and within normal limits, ALT is elevated at 46, however remaining CMP is reassuring, no other labs, imaging, procedures, EKGs in record.   EKG Results: None in record    05/02/2023 Patient is taking medications as prescribed and is tolerating them well. \"I am good\"  \"I am doing really good.\" She is enjoying her new schedule and she has more energy.  Her mood has improved. Sleep is good, she is feeling more rested. She is able to wake up and get ready for work. Her new hours are a lot better in this new position. Denies suicidal ideation. Denies AVH. We will continue to monitor for mood, behavior, and safety.  Continue Adderall XR 10 mg daily  Ambulatory referral for psychology.  Referral for evaluation, treatment, and individual psychotherapy to Emory Guajardo, PhD  [phone]  Follow-up 6 weeks    Record Review is below for 05/02/2023 : I have " "thoroughly reviewed the patient's electronic medical record to include previous encounters, care everywhere, notes, medications, labs, EDU and UDS (if applicable), imaging, and EKG's.  Pertinent information is included in this note.  1/4/2023 TSH and CBC are both reassuring and within normal limits, ALT is elevated at 46, however remaining CMP is reassuring, no other labs, imaging, procedures, EKGs in record.   EKG Results: None in record    03/21/2023Amiguel angel Glez is a 27 y.o. female who presents today for follow up for ADHD and anxiety.   Letty started her new job, things are going really well. She thinks this job is maybe going to be more stress free.  She is able to able to get dressed up and put on her make-up and this is helping her feel much better. Patient states the increased dose of Adderall XR 10mg is definitely improving her ADHD symptoms. She feels like she in not \"waning\" at the end of the day. Focus, concentration, and energy levels are improved. She is starting to train for a half-marathon.  Anxiety has also improved. She states that she no longer feels that she has \"90 tabs\" open in her brain.  Nervous about meeting her boyfriend's mom is coming and going to stay for a month in June. We may be scheduling some appointments for therapy during this time as an outlet for anxiety. Patient has been taking Adderall XR daily and is tolerating it well.  Denies depressed mood.  Denies suicidal ideation.  Denies AVH.  We will continue to monitor for mood, behavior, and safety.  Continue Adderall XR 10 mg daily  Follow-up 6 weeks     Record Review is below for 03/21/2023 : I have thoroughly reviewed the patient's electronic medical record to include previous encounters, care everywhere, notes, medications, labs, EDU and UDS (if applicable), imaging, and EKG's.  Pertinent information is included in this note.  1/4/2023 TSH and CBC are both reassuring and within normal limits, ALT is elevated at 46, " "however remaining CMP is reassuring, no other labs, imaging, procedures, EKGs in record.   EKG Results: None in record    02/20/2023Amiguel angel Glez is a 27 y.o. female who presents today for follow up for ADHD and anxiety.  Patient reports that her focus and concentration have greatly improved with the initiation of Adderall for treatment of her ADHD.  She described taking the medication and felt like it made her mind more \"quiet.\"  Patient says that she is already feeling like she can complete tasks more appropriately and feels more organized.  She does feel like she could use a little bit more help as she is going to be starting a new position on [employer].  Denies SI/HI/AVH.  Denies depressed mood.  PHQ-9 is 2 and YOLA-7 is 0.   Increase Adderall XR to 10 mg daily  Patient will return Adderall XR 5 mg to pharmacy  Follow-up 1 month    Record Review is below for 02/20/2023 : I have thoroughly reviewed the patient's electronic medical record to include previous encounters, care everywhere, notes, medications, labs, EDU and UDS (if applicable), imaging, and EKG's.  Pertinent information is included in this note.  1/4/2023 TSH and CBC are both reassuring and within normal limits, ALT is elevated at 46, however remaining CMP is reassuring, no other labs, imaging, procedures, EKGs in record.     Letty Glez is a 27 y.o. female who presents today for follow up for ADHD, and anxiety. Patient is still having issues with focus and concentration. She has a compelling childhood ADHD assessment however was never referred or officially tested. She also has a compelling adult presentation of ADHD symptoms.  We have been having difficulty finding an opening for neuropsychological testing. Denies depressed mood, does have symptoms of anxiety.  She states that this week has been even worse for her.  She is losing things, having irritability, and has also found that she sometimes purchases things that she already has because " she forgot that she purchased them.  She is ultimately concerned about getting the symptoms under control because she is about to start a new job.  We may need to begin ADHD medication in order to not delay treatment any further for her.  We discussed the need for satisfactory urine drug screen and sign controlled substances agreement.  Denies SI/HI/AVH. PHQ-9, 4 YOLA-7, 4 and the PHQ-9 is congruent with assessment and presentation, however the YOLA-7 presents as somewhat incongruent with her anxiety symptoms.    UDS is satisfactory  POC Urine Drug Screen [facility] (01/30/2023 14:53)  EDU is satisfactory  MICROFILM RECORDS - SCAN - MANUAL [place] EDU REPORT 01/01/2023-01/24/2023 (01/26/2023)  CSA  MICROFILM RECORDS - SCAN - [facility] SIGNED [facility] CONTROLLED SUB CONTRACT (01/30/2023)  Start Adderall XR 5 mg  Follow-up 3 weeks    Record Review is below for 01/30/2023 : I have thoroughly reviewed the patient's electronic medical record to include previous encounters, care everywhere, notes, medications, labs, EDU and UDS (if applicable), imaging, and EKG's.  Pertinent information is included in this note.  1/4/2023 TSH and CBC are both reassuring and within normal limits, ALT is elevated at 46, however remaining CMP is reassuring, no other labs, imaging, procedures, EKGs in record.      1/23/2023 Letty Glez is a 27 y.o. female who presents today for follow up For ADHD, and anxiety. Patient is still having issues with focus and concentration, with hyperactivity. She has a compelling childhood ADHD assessment however was never referred or officially tested. She also has a compelling adult presentation of ADHD symptoms. Denies depressed mood, does have symptoms of anxiety.  Denies SI/HI/AVH. PHQ-9, 4 YOLA-7, 5 and the PHQ-9 is congruent with assessment and presentation, however the YOLA-7 presents as somewhat incongruent with her anxiety symptoms.  We discussed the possibility of starting  Wellbutrin to target ADHD symptoms.  Patient has had extremely negative experience with medications in the past.  She is hesitant to begin a medication while also starting a new job and would like to wait to confirm ADHD diagnosis.    Record Review is below for 01/23/2023 : I have thoroughly reviewed the patient's electronic medical record to include previous encounters, care everywhere, notes, medications, labs, EDU and UDS (if applicable), imaging, and EKG's.  Pertinent information is included in this note.  TSH and CBC are both reassuring and within normal limits, ALT is elevated at 46, however remaining CMP is reassuring, no other labs, imaging, procedures, EKGs in record.  No records for 6-month date range in EDU.    1/9/2023 I am referring her to Dr. Petty, PhD for testing to confirm diagnosis  I expect that she will have a positive diagnosis of ADHD  I would also expect that she will have a satisfactory UDS  Follow-up 1 month  Presentation seems most consistent with generalized anxiety disorder and ADHD DSM-5 criteria.  No medication orders at this time as patient does not wish to start medication and wishes to confirm the diagnosis with testing.  We discussed that treatment of her ADHD could potentially help eliminate her anxiety while increasing concentration and focus.  Will refer to Dr. Petty, PhD for testing to confirm diagnosis of ADHD.  I would expect that she will have a satisfactory UDS and sign a CSA should we consider controlled substances and treatment.  I extensively discussed this with the patient. Patient verbalized understanding and is agreeable to this plan.  Addressed all questions and concerns.  Continue psychotherapeutic modalities as indicated.    Psychiatric/Behavioral Health History  Began Treatment: 2019  Diagnoses: Anxiety  Psychiatrist: Denies, Family dr ordered medicine for anxiety (Celexa) but she did not take it  Therapist: Jason  Admission History: Denies  Medication  Trials: buspar, hydroxyzine, propanolol  Self Harm: Denies  Suicide Attempts: Denies  Trauma: Denies       Labs:  WBC   Date Value Ref Range Status   01/04/2023 5.86 3.40 - 10.80 10*3/mm3 Final     Platelets   Date Value Ref Range Status   01/04/2023 227 140 - 450 10*3/mm3 Final     Hemoglobin   Date Value Ref Range Status   01/04/2023 14.1 12.0 - 15.9 g/dL Final     Hematocrit   Date Value Ref Range Status   01/04/2023 40.9 34.0 - 46.6 % Final     Glucose   Date Value Ref Range Status   01/04/2023 86 65 - 99 mg/dL Final     Creatinine   Date Value Ref Range Status   01/04/2023 0.74 0.57 - 1.00 mg/dL Final     ALT (SGPT)   Date Value Ref Range Status   01/04/2023 46 (H) 1 - 33 U/L Final     AST (SGOT)   Date Value Ref Range Status   01/04/2023 31 1 - 32 U/L Final     BUN   Date Value Ref Range Status   01/04/2023 13 6 - 20 mg/dL Final     eGFR   Date Value Ref Range Status   01/04/2023 113.2 >60.0 mL/min/1.73 Final     Comment:     National Kidney Foundation and American Society of Nephrology (ASN) Task Force recommended calculation based on the Chronic Kidney Disease Epidemiology Collaboration (CKD-EPI) equation refit without adjustment for race.     TSH   Date Value Ref Range Status   01/04/2023 1.070 0.270 - 4.200 uIU/mL Final      Pain Management Panel          Latest Ref Rng & Units 1/30/2023   Pain Management Panel   Amphetamine, Urine Qual Negative Negative    Barbiturates Screen, Urine Negative Negative    Benzodiazepine Screen, Urine Negative Negative    Buprenorphine, Screen, Urine Negative Negative    Cocaine Screen, Urine Negative Negative    Methadone Screen , Urine Negative Negative    Methamphetamine, Ur Negative Negative         Imaging Results:  No Images in the past 120 days found..    Current Medications:   Current Outpatient Medications   Medication Sig Dispense Refill    ALBUTEROL IN       ALPRAZolam (Xanax) 0.25 MG tablet Take 1 tablet by mouth Daily As Needed for Anxiety (Panic Attacks; use  sparingly). 10 tablet 0    cetirizine (zyrTEC) 10 MG tablet Take 1 tablet by mouth Daily.      dicyclomine (BENTYL) 10 MG capsule Take 1 capsule by mouth 3 (Three) Times a Day As Needed (loose stools/ IBS). 60 capsule 2    multivitamin with minerals tablet tablet Take 1 tablet by mouth Daily.      propranolol (INDERAL) 10 MG tablet Take 1 tablet by mouth 2 (Two) Times a Day As Needed (Anxiety). 60 tablet 1    sodium chloride 0.9 % nebulizer solution 0.82 mL with albuterol (5 MG/ML) 0.5% nebulizer solution 0.9 mg Take 15 mg/hr by nebulization Continuous.      amphetamine-dextroamphetamine XR (Adderall XR) 15 MG 24 hr capsule Take 1 capsule by mouth Daily (Patient not taking: Reported on 8/23/2023) 30 capsule 0    guanFACINE (TENEX) 1 MG tablet Take 1 tablet by mouth Every Night. 30 tablet 1     No current facility-administered medications for this visit.       Problem List:  Patient Active Problem List   Diagnosis    Concentration deficit    Diarrhea    Thyroid disorder screen    Irritable bowel syndrome with diarrhea       Allergy:   Allergies   Allergen Reactions    Mixed Feathers Other (See Comments)     Told as child to not take (CHICKEN FEATHERS)    Codeine Nausea And Vomiting    Haemophilus Influenzae Vaccines Other (See Comments)     ALLERGIC TO CHICKEN FEATHERS AND CHICKEN BASED INGREDIENTS         Discontinued Medications:  Medications Discontinued During This Encounter   Medication Reason    Ferrous Sulfate (IRON PO) *Therapy completed    COLLAGEN PO *Therapy completed       Mental Status Exam:   Appearance: good eye contact, normal street clothes, groomed, sitting in chair   Behavior: pleasant and cooperative  Motor: no abnormal  Speech: normal rhythm, rate, volume, tone, not hyperverbal, not pressured, normal prosidy  Mood: Euthymic  Affect: Appropriate  Thought Content: negative suicidal ideations, negative homicidal ideations, negative obsessions  Perceptions: negative auditory hallucinations, negative  visual hallucinations, negative delusions, negative paranoia  Thought Process: goal directed, linear  Insight/Judgement: fair/fair  Cognition: grossly intact  Attention: intact  Orientation: person, place, time and situation  Memory: intact    Review of Systems:   Constitutional: Denies fatigue, night sweats  Eyes: Denies double vision, blurred vision  HENT: Denies vertigo, recent head injury  Cardiovascular: Denies chest pain, irregular heartbeats  Respiratory: Denies productive cough, shortness of breath  Gastrointestinal: Denies nausea, vomiting  Genitourinary: Denies dysuria, urinary retention  Integument: Denies hair growth change, new skin lesions  Neurologic: Denies altered mental status, seizures  Musculoskeletal: Denies joint swelling, limitation of motion  Endocrine: Denies cold intolerance, heat intolerance  Psychiatric: See mental status exam  Allergic-immunologic: Denies frequent illnesses    PLAN:   Presentation seems most consistent with DSM-V criteria for:  Diagnoses and all orders for this visit:    1. Attention deficit hyperactivity disorder (ADHD), predominantly inattentive type (Primary)  -     guanFACINE (TENEX) 1 MG tablet; Take 1 tablet by mouth Every Night.  Dispense: 30 tablet; Refill: 1    2. YOLA (generalized anxiety disorder)    3. Panic attacks    4. Current mild episode of major depressive disorder without prior episode       Discontinue Adderall  Start guanfacine 1mg at bedtime  Continue propanolol 10mg bid prn for anxiety/panic attacks  Continue alprazolam 0.25mg qd prn for anxiety/panic attacks qty. 10  Follow-up 1 month  Discussed medication options and treatment plan of prescribed medication as well as the risks, benefits, and side effects.  Patient verbalized understanding and is agreeable to this plan.   Patient is agreeable to call the office with any worsening of symptoms or onset of side effects.   Patient is agreeable to call 911 or go to the nearest ER should he/she begin  having SI/HI.   Addressed all questions and concerns.    Continue psychotherapeutic modalities as indicated.    TREATMENT PLAN/GOALS:  Treatment plan: Continue supportive psychotherapy efforts and medications as indicated. Continue to challenge patterns of living conducive to pathology, strengthen defenses, promote problems solving, restore adaptive functioning and provide symptom relief. Treatment and medication options discussed during today's visit. Patient acknowledged and verbally consented to continue with current treatment plan and was educated on the importance of compliance with treatment and follow-up appointments.  Functional status:Good  Prognosis: Good  Progress: Continued improvement    Safety: No acute safety concerns.   Psychotherapy:      20 minutes of supportive psychotherapy with goal to strengthen defenses, promote problems solving, restore adaptive functioning and provide symptom relief. The therapeutic alliance was strengthened to encourage the patient to express their thoughts and feelings. Esteem building was enhanced through praise, reassurance, normalizing and encouragement. Coping skills were enhanced to build distress tolerance skills and emotional regulation. Allowed patient to freely discuss issues without interruption or judgement with unconditional positive regard, active listening skills, and empathy. Provided a safe, confidential environment to facilitate the development of a positive therapeutic relationship and encourage open, honest communication. Assisted patient in identifying risk factors which would indicate the need for higher level of care including thoughts to harm self or others and/or self-harming behavior and encouraged patient to contact this office, call 911, or present to the nearest emergency room should any of these events occur. Assisted patient in processing session content; acknowledged and normalized patient's thoughts, feelings, and concerns by utilizing a  person-centered approach in efforts to build appropriate rapport and a positive therapeutic relationship with open and honest communication. Patient given education on medication side effects, diagnosis/illness and relapse symptoms. Plan to continue supportive psychotherapy in next appointment to provide symptom relief.        Risk Assessment: Risk of self-harm acutely and chronically is moderate.    Risk factors include anxiety disorder, mood disorder, and recent psychosocial stressors.   Protective factors include no family history, denies access to guns/weapons, no present SI, no history of suicide attempts or self-harm in the past, minimal AODA, healthcare seeking, future orientation, willingness to engage in care.    Risk assessment could be further elevated in the event of treatment noncompliance and/or AODA.  Labs/studies: No labs/studies ordered at this time  Medications:   New Medications Ordered This Visit   Medications    guanFACINE (TENEX) 1 MG tablet     Sig: Take 1 tablet by mouth Every Night.     Dispense:  30 tablet     Refill:  1      Medication Education:   GUANFACINE (INTUNIV/TENEX) Risks, benefits, alternatives discussed with patient and mom including sedation, dizziness/falls risk, GI upset, dry mouth, constipation, hypotension. After discussion of these risks and benefits, the patient and mom voiced understanding and agreed to proceed.  ADDERALL (AMPHETAMINE) Risks, benefits, side effects discussed with patient including elevated heart rate, elevated blood pressure, irritability, insomnia, sexual dysfunction, appetite suppressing properties, psychosis.  After discussion of these risks and benefits, the patient voiced understanding and agreed to proceed. [place] reviewed, UDS ordered, and controlled substance agreement signed & witnessed.  INDERAL (PROPANOLOL) Risks, benefits, alternatives discussed with patient including dizziness, sedation, falls, low blood pressure, low heart rate, possible  exacerbation of asthma.  After discussion of these risks and benefits, the patient voiced understanding and agreed to proceed.  XANAX (ALPRAZOLAM) Risks, benefits, and alternatives discussed with patient including sedation/falls risk, dizziness, disinhibition, headache, fatigue, dry mouth, blurred vision, constipation, nausea, diarrhea, heartburn, unusual taste in mouth, urinary retention, increased appetite, restlessness, sexual dysfunction, sweating, weight gain, cardiac arrhythmias, seizures, and fall risk.  After discussion of these risks and benefits, patient voiced understanding and agreed to proceed.  Abdon reviewed, UDS ordered, and controlled substance agreement signed & witnessed.    Follow-up: Return in about 1 month (around 9/23/2023) for Recheck, Next scheduled follow up.         This document has been electronically signed by VALENTIN Momin  August 23, 2023 14:08 EDT    Please note that portions of this note were completed with a voice recognition program.  Copied text in this note has been reviewed and is accurate as of 08/23/23

## 2023-08-23 NOTE — TELEPHONE ENCOUNTER
RECEIVED FAX COMMUNICATION FROM PHARMACY REQUESTING A 90 DAYS SUPPLY OF PTS GUANFACINE 1 MG TABLET INSTEAD OF A 30 DAY SUPPLY.     PLEASE REVIEW. THANK YOU.

## 2023-08-23 NOTE — PATIENT INSTRUCTIONS
1.  Please return to clinic at your next scheduled visit.  Please contact the clinic ([phone]) at least 24 hours prior in the event you need to cancel.  2.  Do no harm to yourself or others.    3.  Avoid alcohol and drugs.    4.  Take all medications as prescribed.  Please contact the clinic with any concerns. If you are in need of medication refills, please call the clinic at [phone].    5. Should you want to get in touch with your provider, VALENTIN Momin, please contact the office ([phone]), and staff will be able to page Ginger directly.  6. In the event you have personal crisis, contact the following crisis numbers: Suicide Prevention Hotline 1-431.467.1124; [facility] Helpline 3-534-966-MJGY; [facility] Emergency Room [phone]; text HELLO to 108507; or 599.     SPECIFIC RECOMMENDATIONS:     1.      Medications discussed at this encounter:     New Medications Ordered This Visit   Medications    guanFACINE (TENEX) 1 MG tablet     Sig: Take 1 tablet by mouth Every Night.     Dispense:  30 tablet     Refill:  1                       2.      Psychotherapy recommendations: We will continue therapy at future visits.     3.     Return to clinic: Return in about 1 month (around 9/23/2023) for Recheck, Next scheduled follow up.

## 2023-08-29 ENCOUNTER — OFFICE VISIT (OUTPATIENT)
Dept: OBSTETRICS AND GYNECOLOGY | Facility: CLINIC | Age: 28
End: 2023-08-29
Payer: COMMERCIAL

## 2023-08-29 VITALS
HEIGHT: 62 IN | SYSTOLIC BLOOD PRESSURE: 118 MMHG | WEIGHT: 180.6 LBS | DIASTOLIC BLOOD PRESSURE: 70 MMHG | BODY MASS INDEX: 33.23 KG/M2

## 2023-08-29 DIAGNOSIS — N92.0 MENORRHAGIA WITH REGULAR CYCLE: Primary | ICD-10-CM

## 2023-08-29 NOTE — PROGRESS NOTES
"        REASON FOR FOLLOWUP/CHIEF COMPLAINT: (Patient is here today for painful and heavy cycles. )      HISTORY OF PRESENT ILLNESS:  The patient 27-year-old with recent last menstrual period.  She states that her cycles are typically regular every 28 or so days and she bleeds for 4 days.  She will pass some small clots and has some mild dysmenorrhea that does not require her to take any medications.  She does state that sometimes during her cycle it will affect her activities but not the ability to go to work or do things that are normally scheduled.  She states that she normally does not like to take medications and does not regularly take acetaminophen or ibuprofen for her cycles.  She states her last menses was much heavier with larger clots and more significant pain associated with them.  She is concerned about the level of discomfort she had as well as her mother's history of endometrial cancer.  Her mother apparently was age 60 and had constant bleeding up until the time of diagnostic studies and had surgery and later had metastasis.      Past Medical History, Past Surgical History, Social History, Family History have been reviewed and are without significant changes except as mentioned.    Review of Systems   Review of Systems   Constitutional:  Positive for fatigue. Negative for fever.   Genitourinary:  Positive for menstrual problem. Negative for pelvic pain.     Medications:  The current medication list was reviewed in the EMR    ALLERGIES:    Allergies   Allergen Reactions    Mixed Feathers Other (See Comments)     Told as child to not take (CHICKEN FEATHERS)    Codeine Nausea And Vomiting    Haemophilus Influenzae Vaccines Other (See Comments)     ALLERGIC TO CHICKEN FEATHERS AND CHICKEN BASED INGREDIENTS               Vitals:    08/29/23 1301   BP: 118/70   Weight: 81.9 kg (180 lb 9.6 oz)   Height: 157.5 cm (62\")     Physical Exam    CONSTITUTIONAL:  Vital signs reviewed.  No distress, looks " comfortable.    PSYCHIATRIC:  Normal judgment and insight.  Normal mood and affect.       RECENT LABS:  WBC   Date Value Ref Range Status   01/04/2023 5.86 3.40 - 10.80 10*3/mm3 Final     Hemoglobin   Date Value Ref Range Status   01/04/2023 14.1 12.0 - 15.9 g/dL Final     Platelets   Date Value Ref Range Status   01/04/2023 227 140 - 450 10*3/mm3 Final       ASSESSMENT/PLAN:  Letty Magallanessadishi [unfilled]   1.  Menorrhagia with dysmenorrhea.  Patient would like to get a baseline updated CBC to make sure she is not anemic due to the heavy flow and get pelvic ultrasound to assess the endometrium.  I discussed usual medications such as naproxen or ibuprofen for the dysmenorrhea and that this may also decrease her menses if she takes it throughout the bleeding phase.  We also discussed use of low-dose oral contraceptives for management of heavy and painful periods and that also these have a lifetime risk lowering effect of endometrial cancer.  She will get the ultrasound and then reschedule for her annual visit to update her Pap screen.

## 2023-08-30 ENCOUNTER — TELEPHONE (OUTPATIENT)
Dept: OBSTETRICS AND GYNECOLOGY | Facility: CLINIC | Age: 28
End: 2023-08-30
Payer: COMMERCIAL

## 2023-08-30 LAB
ERYTHROCYTE [DISTWIDTH] IN BLOOD BY AUTOMATED COUNT: 12.3 % (ref 12.3–15.4)
HCT VFR BLD AUTO: 41.8 % (ref 34–46.6)
HGB BLD-MCNC: 14.3 G/DL (ref 12–15.9)
MCH RBC QN AUTO: 30.8 PG (ref 26.6–33)
MCHC RBC AUTO-ENTMCNC: 34.2 G/DL (ref 31.5–35.7)
MCV RBC AUTO: 90.1 FL (ref 79–97)
PLATELET # BLD AUTO: 234 10*3/MM3 (ref 140–450)
RBC # BLD AUTO: 4.64 10*6/MM3 (ref 3.77–5.28)
WBC # BLD AUTO: 4.69 10*3/MM3 (ref 3.4–10.8)

## 2023-08-30 NOTE — TELEPHONE ENCOUNTER
----- Message from Emery De Los Santos MD sent at 8/30/2023  7:22 AM EDT -----  Autumn, your blood count performed recently was in normal range.  You are not anemic.

## 2023-08-30 NOTE — TELEPHONE ENCOUNTER
Left voicemail for patient to return call to office or check her MyChart message from Dr. De Los Santos.

## 2023-08-31 ENCOUNTER — PATIENT ROUNDING (BHMG ONLY) (OUTPATIENT)
Dept: OBSTETRICS AND GYNECOLOGY | Facility: CLINIC | Age: 28
End: 2023-08-31
Payer: COMMERCIAL

## 2023-08-31 ENCOUNTER — PATIENT MESSAGE (OUTPATIENT)
Dept: OBSTETRICS AND GYNECOLOGY | Facility: CLINIC | Age: 28
End: 2023-08-31
Payer: COMMERCIAL

## 2023-08-31 NOTE — PROGRESS NOTES
My chart message has been sent to the patient for PATIENT ROUNDING with [facility].

## 2023-09-05 ENCOUNTER — TELEPHONE (OUTPATIENT)
Dept: PSYCHIATRY | Facility: CLINIC | Age: 28
End: 2023-09-05
Payer: COMMERCIAL

## 2023-09-05 NOTE — TELEPHONE ENCOUNTER
PT CALLED AND STATED THAT SHE'S BEEN ON THE GUANFACINE 1 MG TABLET FOR TWO WEEKS.     SHE DOES NOT FEEL LIKE IT IS DOING ANYTHING.   IT MAYBE HAS HELPED IN TERMS OF HER MOOD BUT HER FOCUS AND STAYING ON TASKS, SHE IS ALL OVER THE PLACE.     SHE FEELS AS IF SHE CAN'T STAY ON TRACK OR STAY FOCUSED.     PROVIDER PLEASE ADVISE.    NEXT FOLLOW UP IS ON 09/13/23.

## 2023-09-06 NOTE — TELEPHONE ENCOUNTER
Attempted to phone patient. M for patient to return phone call to discuss medications concerns.      VALENTIN Momin, PMHNP-BC  [facility]  Office: [phone]

## 2023-09-13 ENCOUNTER — OFFICE VISIT (OUTPATIENT)
Dept: BEHAVIORAL HEALTH | Facility: CLINIC | Age: 28
End: 2023-09-13
Payer: COMMERCIAL

## 2023-09-13 VITALS
HEART RATE: 48 BPM | BODY MASS INDEX: 33.44 KG/M2 | OXYGEN SATURATION: 100 % | SYSTOLIC BLOOD PRESSURE: 99 MMHG | WEIGHT: 181.7 LBS | HEIGHT: 62 IN | DIASTOLIC BLOOD PRESSURE: 65 MMHG

## 2023-09-13 DIAGNOSIS — F90.0 ATTENTION DEFICIT HYPERACTIVITY DISORDER (ADHD), PREDOMINANTLY INATTENTIVE TYPE: Primary | ICD-10-CM

## 2023-09-13 DIAGNOSIS — F41.0 PANIC ATTACKS: ICD-10-CM

## 2023-09-13 DIAGNOSIS — F32.0 CURRENT MILD EPISODE OF MAJOR DEPRESSIVE DISORDER WITHOUT PRIOR EPISODE: ICD-10-CM

## 2023-09-13 DIAGNOSIS — F41.1 GAD (GENERALIZED ANXIETY DISORDER): ICD-10-CM

## 2023-09-13 RX ORDER — ESCITALOPRAM OXALATE 5 MG/1
5 TABLET ORAL DAILY
Qty: 30 TABLET | Refills: 1 | Status: SHIPPED | OUTPATIENT
Start: 2023-09-13

## 2023-09-13 NOTE — PROGRESS NOTES
"[facility]  Medication Management Follow-up    Referring Provider:  No referring provider defined for this encounter.    Vital Signs:   BP 99/65   Pulse (!) 48   Ht 157.5 cm (62\")   Wt 82.4 kg (181 lb 11.2 oz)   SpO2 100%   BMI 33.23 kg/m²     Chief Complaint: Anxiety. ADHD. Panic Attacks.    History of Present Illness:   Letty Glez is a 27 y.o. female who presents today for follow-up and medication management for:    ICD-10-CM ICD-9-CM   1. Attention deficit hyperactivity disorder (ADHD), predominantly inattentive type  F90.0 314.00   2. Current mild episode of major depressive disorder without prior episode  F32.0 296.21   3. YOLA (generalized anxiety disorder)  F41.1 300.02   4. Panic attacks  F41.0 300.01       09/13/2023 Patient is taking medications as prescribed and is tolerating them well.   \"I feel good mood-wise\" continues to have significant anxiety.  She is wondering if sometimes the Adderall was making anxiety worse.  We have tried nonstimulant medications for ADHD.  We are discussing a more long-term management for generalized anxiety disorder and panic attacks.  She is willing to trial this.  Has been having major panic attacks sometimes daily.  Xanax does help with panic attacks.  She is only taking it occasionally and if absolutely necessary.  She believes that these symptoms are decreasing her ability to focus and concentrate.  Denies suicidal ideation.  Denies AVH.  We will continue to monitor for mood, behavior, and safety.    Record Review is below for 09/13/2023 : I have thoroughly reviewed the patient's electronic medical record to include previous encounters, care everywhere, notes, medications, labs, EDU and UDS (if applicable), imaging, and EKG's.  Pertinent information is included in this note.  1/4/2023 TSH and CBC are both reassuring and within normal limits, ALT is elevated at 46, however remaining CMP is reassuring, no other labs, imaging, procedures, " "EKGs in record.   EKG Results: None in record  Head Imaging:  None in record    08/23/2023 Patient is taking medications as prescribed and is tolerating them well.   Feels like she has been driving the \"struggle bus\" for a while. She has taken Xanax a couple times for panic attacks and it has helped. Has not been taking Adderall XR for about 2 weeks, when the dose was increased to 15mg she said it was \"too much\" and she also was feeling like the 10mg was maybe causing mood reactivity.   Depression  Visit Type: follow-up (Depression, YOLA, ADHD, Panic Attacks)  Patient presents with the following symptoms: anhedonia, decreased concentration, depressed mood, excessive worry, feelings of hopelessness, feelings of worthlessness, irritability, nervousness/anxiety, panic and psychomotor agitation.  Patient is not experiencing: fatigue, suicidal ideas, suicidal planning and thoughts of death.  Frequency of symptoms: most days   Severity: causing significant distress   Sleep quality: fair  Denies suicidal ideation.  Denies AVH.  We will continue to monitor for mood, behavior, and safety.  Discontinue Adderall  Start guanfacine 1mg at bedtime  Continue propanolol 10mg bid prn for anxiety/panic attacks  Continue alprazolam 0.25mg qd prn for anxiety/panic attacks qty. 10  Follow-up 1 month    Record Review is below for 08/23/2023 : I have thoroughly reviewed the patient's electronic medical record to include previous encounters, care everywhere, notes, medications, labs, EDU and UDS (if applicable), imaging, and EKG's.  Pertinent information is included in this note.  1/4/2023 TSH and CBC are both reassuring and within normal limits, ALT is elevated at 46, however remaining CMP is reassuring, no other labs, imaging, procedures, EKGs in record.   EKG Results: None in record  Head Imaging:  None in record    07/19/2023 Patient is taking medications as prescribed and is tolerating them well.   She has been battling increased " "anxiety. This is coming in waves, some days are better than others. She is forgetting things and misplacing things. She is really frustrated with herself. She does not want to start a SSRI to target anxiety. She is having panic attacks nearly daily, sense of impending doom, her hand feels numb.   Adderall XR was effective when we first started medication, now it wears off quickly.   Denies suicidal ideation.  Denies AVH.  We will continue to monitor for mood, behavior, and safety.  Increase Adderall XR to 15mg daily  Continue propanolol 10mg bid prn for anxiety/panic attacks  Start alprazolam 0.25mg qd prn for anxiety/panic attacks qty. 10  Discontinue hydroxyzine  Follow-up 1 month    Record Review is below for 07/19/2023 : I have thoroughly reviewed the patient's electronic medical record to include previous encounters, care everywhere, notes, medications, labs, EDU and UDS (if applicable), imaging, and EKG's.  Pertinent information is included in this note.  1/4/2023 TSH and CBC are both reassuring and within normal limits, ALT is elevated at 46, however remaining CMP is reassuring, no other labs, imaging, procedures, EKGs in record.   EKG Results: None in record  Head Imaging:  None in record    7/13/2023 Telephone encounter patient is experiencing intense anxiety, she is having panic attacks despite taking hydroxyzine. She reports that she has taken buspar before and it made her feel like a \"zombie.\" We are going to discuss SSRIs at next appt for treating anxiety.      Start propanolol 10mg bid prn for anxiety/panic attacks  Continue follow up appointment with me   Appointment with Ginger Thakur APRN (07/19/2023)     06/13/2023 Patient is taking medications as prescribed and is tolerating them well. Work is really good. She is under extreme stress because there are 6 people in her house right now and they also brought their dog with them. He boyfriend has recently proposed and she accepted, however, she " "is somewhat ambivalent about what she wants to do for their future. She does feel like her anxiety and ADHD symptoms are well controlled with Adderall XR.  Denies suicidal ideation. Denies AVH. We will continue to monitor for mood, behavior, and safety.  Continue Adderall XR 10 mg daily  Follow-up 1 month    Record Review is below for 06/13/2023 : I have thoroughly reviewed the patient's electronic medical record to include previous encounters, care everywhere, notes, medications, labs, EDU and UDS (if applicable), imaging, and EKG's.  Pertinent information is included in this note.  1/4/2023 TSH and CBC are both reassuring and within normal limits, ALT is elevated at 46, however remaining CMP is reassuring, no other labs, imaging, procedures, EKGs in record.   EKG Results: None in record    05/02/2023 Patient is taking medications as prescribed and is tolerating them well. \"I am good\"  \"I am doing really good.\" She is enjoying her new schedule and she has more energy.  Her mood has improved. Sleep is good, she is feeling more rested. She is able to wake up and get ready for work. Her new hours are a lot better in this new position. Denies suicidal ideation. Denies AVH. We will continue to monitor for mood, behavior, and safety.  Continue Adderall XR 10 mg daily  Ambulatory referral for psychology.  Referral for evaluation, treatment, and individual psychotherapy to Emory Guajardo, PhD  [phone]  Follow-up 6 weeks    Record Review is below for 05/02/2023 : I have thoroughly reviewed the patient's electronic medical record to include previous encounters, care everywhere, notes, medications, labs, EDU and UDS (if applicable), imaging, and EKG's.  Pertinent information is included in this note.  1/4/2023 TSH and CBC are both reassuring and within normal limits, ALT is elevated at 46, however remaining CMP is reassuring, no other labs, imaging, procedures, EKGs in record.   EKG Results: None in " "record    03/21/2023Amiguel angel Glez is a 27 y.o. female who presents today for follow up for ADHD and anxiety.   Letty started her new job, things are going really well. She thinks this job is maybe going to be more stress free.  She is able to able to get dressed up and put on her make-up and this is helping her feel much better. Patient states the increased dose of Adderall XR 10mg is definitely improving her ADHD symptoms. She feels like she in not \"waning\" at the end of the day. Focus, concentration, and energy levels are improved. She is starting to train for a half-marathon.  Anxiety has also improved. She states that she no longer feels that she has \"90 tabs\" open in her brain.  Nervous about meeting her boyfriend's mom is coming and going to stay for a month in June. We may be scheduling some appointments for therapy during this time as an outlet for anxiety. Patient has been taking Adderall XR daily and is tolerating it well.  Denies depressed mood.  Denies suicidal ideation.  Denies AVH.  We will continue to monitor for mood, behavior, and safety.  Continue Adderall XR 10 mg daily  Follow-up 6 weeks     Record Review is below for 03/21/2023 : I have thoroughly reviewed the patient's electronic medical record to include previous encounters, care everywhere, notes, medications, labs, EDU and UDS (if applicable), imaging, and EKG's.  Pertinent information is included in this note.  1/4/2023 TSH and CBC are both reassuring and within normal limits, ALT is elevated at 46, however remaining CMP is reassuring, no other labs, imaging, procedures, EKGs in record.   EKG Results: None in record    02/20/2023Amiguel angel Glez is a 27 y.o. female who presents today for follow up for ADHD and anxiety.  Patient reports that her focus and concentration have greatly improved with the initiation of Adderall for treatment of her ADHD.  She described taking the medication and felt like it made her mind more \"quiet.\"  Patient " says that she is already feeling like she can complete tasks more appropriately and feels more organized.  She does feel like she could use a little bit more help as she is going to be starting a new position on [employer].  Denies SI/HI/AVH.  Denies depressed mood.  PHQ-9 is 2 and YOLA-7 is 0.   Increase Adderall XR to 10 mg daily  Patient will return Adderall XR 5 mg to pharmacy  Follow-up 1 month    Record Review is below for 02/20/2023 : I have thoroughly reviewed the patient's electronic medical record to include previous encounters, care everywhere, notes, medications, labs, EDU and UDS (if applicable), imaging, and EKG's.  Pertinent information is included in this note.  1/4/2023 TSH and CBC are both reassuring and within normal limits, ALT is elevated at 46, however remaining CMP is reassuring, no other labs, imaging, procedures, EKGs in record.     Letty Glez is a 27 y.o. female who presents today for follow up for ADHD, and anxiety. Patient is still having issues with focus and concentration. She has a compelling childhood ADHD assessment however was never referred or officially tested. She also has a compelling adult presentation of ADHD symptoms.  We have been having difficulty finding an opening for neuropsychological testing. Denies depressed mood, does have symptoms of anxiety.  She states that this week has been even worse for her.  She is losing things, having irritability, and has also found that she sometimes purchases things that she already has because she forgot that she purchased them.  She is ultimately concerned about getting the symptoms under control because she is about to start a new job.  We may need to begin ADHD medication in order to not delay treatment any further for her.  We discussed the need for satisfactory urine drug screen and sign controlled substances agreement.  Denies SI/HI/AVH. PHQ-9, 4 YOLA-7, 4 and the PHQ-9 is congruent with assessment and presentation, however  the YOLA-7 presents as somewhat incongruent with her anxiety symptoms.    UDS is satisfactory  POC Urine Drug Screen [facility] (01/30/2023 14:53)  EDU is satisfactory  MICROFILM RECORDS - SCAN - MANUAL [place] EDU REPORT 01/01/2023-01/24/2023 (01/26/2023)  CSA  MICROFILM RECORDS - SCAN - [place] SIGNED [facility] CONTROLLED SUB CONTRACT (01/30/2023)  Start Adderall XR 5 mg  Follow-up 3 weeks    Record Review is below for 01/30/2023 : I have thoroughly reviewed the patient's electronic medical record to include previous encounters, care everywhere, notes, medications, labs, EDU and UDS (if applicable), imaging, and EKG's.  Pertinent information is included in this note.  1/4/2023 TSH and CBC are both reassuring and within normal limits, ALT is elevated at 46, however remaining CMP is reassuring, no other labs, imaging, procedures, EKGs in record.      1/23/2023 Letty Glez is a 27 y.o. female who presents today for follow up For ADHD, and anxiety. Patient is still having issues with focus and concentration, with hyperactivity. She has a compelling childhood ADHD assessment however was never referred or officially tested. She also has a compelling adult presentation of ADHD symptoms. Denies depressed mood, does have symptoms of anxiety.  Denies SI/HI/AVH. PHQ-9, 4 YOLA-7, 5 and the PHQ-9 is congruent with assessment and presentation, however the YOLA-7 presents as somewhat incongruent with her anxiety symptoms.  We discussed the possibility of starting Wellbutrin to target ADHD symptoms.  Patient has had extremely negative experience with medications in the past.  She is hesitant to begin a medication while also starting a new job and would like to wait to confirm ADHD diagnosis.    Record Review is below for 01/23/2023 : I have thoroughly reviewed the patient's electronic medical record to include previous encounters, care everywhere, notes, medications, labs, EDU and UDS (if applicable),  imaging, and EKG's.  Pertinent information is included in this note.  TSH and CBC are both reassuring and within normal limits, ALT is elevated at 46, however remaining CMP is reassuring, no other labs, imaging, procedures, EKGs in record.  No records for 6-month date range in [facility].    1/9/2023 I am referring her to Dr. Petty, PhD for testing to confirm diagnosis  I expect that she will have a positive diagnosis of ADHD  I would also expect that she will have a satisfactory UDS  Follow-up 1 month  Presentation seems most consistent with generalized anxiety disorder and ADHD DSM-5 criteria.  No medication orders at this time as patient does not wish to start medication and wishes to confirm the diagnosis with testing.  We discussed that treatment of her ADHD could potentially help eliminate her anxiety while increasing concentration and focus.  Will refer to Dr. Petty, PhD for testing to confirm diagnosis of ADHD.  I would expect that she will have a satisfactory UDS and sign a CSA should we consider controlled substances and treatment.  I extensively discussed this with the patient. Patient verbalized understanding and is agreeable to this plan.  Addressed all questions and concerns.  Continue psychotherapeutic modalities as indicated.    Psychiatric/Behavioral Health History  Began Treatment: 2019  Diagnoses: Anxiety  Psychiatrist: Denies, Family dr ordered medicine for anxiety (Celexa) but she did not take it  Therapist: Denies  Admission History: Denies  Medication Trials: buspar, hydroxyzine, propanolol  Self Harm: Denies  Suicide Attempts: Denies  Trauma: Denies    MENTAL STATUS EXAM        Review of systems is negative except as noted in HPI.  Labs:  WBC   Date Value Ref Range Status   08/29/2023 4.69 3.40 - 10.80 10*3/mm3 Final     Platelets   Date Value Ref Range Status   08/29/2023 234 140 - 450 10*3/mm3 Final     Hemoglobin   Date Value Ref Range Status   08/29/2023 14.3 12.0 - 15.9 g/dL Final      Hematocrit   Date Value Ref Range Status   08/29/2023 41.8 34.0 - 46.6 % Final     Glucose   Date Value Ref Range Status   01/04/2023 86 65 - 99 mg/dL Final     Creatinine   Date Value Ref Range Status   01/04/2023 0.74 0.57 - 1.00 mg/dL Final     ALT (SGPT)   Date Value Ref Range Status   01/04/2023 46 (H) 1 - 33 U/L Final     AST (SGOT)   Date Value Ref Range Status   01/04/2023 31 1 - 32 U/L Final     BUN   Date Value Ref Range Status   01/04/2023 13 6 - 20 mg/dL Final     eGFR   Date Value Ref Range Status   01/04/2023 113.2 >60.0 mL/min/1.73 Final     Comment:     National Kidney Foundation and American Society of Nephrology (ASN) Task Force recommended calculation based on the Chronic Kidney Disease Epidemiology Collaboration (CKD-EPI) equation refit without adjustment for race.     TSH   Date Value Ref Range Status   01/04/2023 1.070 0.270 - 4.200 uIU/mL Final      Pain Management Panel          Latest Ref Rng & Units 1/30/2023   Pain Management Panel   Amphetamine, Urine Qual Negative Negative    Barbiturates Screen, Urine Negative Negative    Benzodiazepine Screen, Urine Negative Negative    Buprenorphine, Screen, Urine Negative Negative    Cocaine Screen, Urine Negative Negative    Methadone Screen , Urine Negative Negative    Methamphetamine, Ur Negative Negative         Imaging Results:  No Images in the past 120 days found..    Current Medications:   Current Outpatient Medications   Medication Sig Dispense Refill    ALBUTEROL IN       cetirizine (zyrTEC) 10 MG tablet Take 1 tablet by mouth Daily.      dicyclomine (BENTYL) 10 MG capsule Take 1 capsule by mouth 3 (Three) Times a Day As Needed (loose stools/ IBS). (Patient taking differently: Take 1 capsule by mouth 3 (Three) Times a Day As Needed (loose stools/ IBS). prn) 60 capsule 2    multivitamin with minerals tablet tablet Take 1 tablet by mouth Daily.      ALPRAZolam (Xanax) 0.25 MG tablet Take 1 tablet by mouth Daily As Needed for Anxiety  (Panic Attacks; use sparingly). (Patient not taking: Reported on 8/29/2023) 10 tablet 0    escitalopram (Lexapro) 5 MG tablet Take 1 tablet by mouth Daily. 30 tablet 1    propranolol (INDERAL) 10 MG tablet Take 1 tablet by mouth 2 (Two) Times a Day As Needed (Anxiety). (Patient not taking: Reported on 8/29/2023) 60 tablet 1    sodium chloride 0.9 % nebulizer solution 0.82 mL with albuterol (5 MG/ML) 0.5% nebulizer solution 0.9 mg Take 15 mg/hr by nebulization Continuous. (Patient not taking: Reported on 9/13/2023)       No current facility-administered medications for this visit.       Problem List:  Patient Active Problem List   Diagnosis    Concentration deficit    Diarrhea    Thyroid disorder screen    Irritable bowel syndrome with diarrhea       Allergy:   Allergies   Allergen Reactions    Mixed Feathers Other (See Comments)     Told as child to not take (CHICKEN FEATHERS)    Codeine Nausea And Vomiting    Haemophilus Influenzae Vaccines Other (See Comments)     ALLERGIC TO CHICKEN FEATHERS AND CHICKEN BASED INGREDIENTS         Discontinued Medications:  Medications Discontinued During This Encounter   Medication Reason    guanFACINE (TENEX) 1 MG tablet Alternate therapy         PLAN:   Presentation seems most consistent with DSM-V criteria for:  Diagnoses and all orders for this visit:    1. Attention deficit hyperactivity disorder (ADHD), predominantly inattentive type (Primary)    2. Current mild episode of major depressive disorder without prior episode  -     escitalopram (Lexapro) 5 MG tablet; Take 1 tablet by mouth Daily.  Dispense: 30 tablet; Refill: 1    3. YOLA (generalized anxiety disorder)  -     escitalopram (Lexapro) 5 MG tablet; Take 1 tablet by mouth Daily.  Dispense: 30 tablet; Refill: 1    4. Panic attacks  -     escitalopram (Lexapro) 5 MG tablet; Take 1 tablet by mouth Daily.  Dispense: 30 tablet; Refill: 1       Start Lexapro 5 mg daily.  Discontinue guanfacine 1mg at bedtime  Continue  propanolol 10mg bid prn for anxiety/panic attacks  Continue alprazolam 0.25mg qd prn for anxiety/panic attacks qty. 10  Follow-up 1 month  Discussed medication options and treatment plan of prescribed medication as well as the risks, benefits, and side effects.  Patient verbalized understanding and is agreeable to this plan.   Patient is agreeable to call the office with any worsening of symptoms or onset of side effects.   Patient is agreeable to call 911 or go to the nearest ER should he/she begin having SI/HI.   Addressed all questions and concerns.    Continue psychotherapeutic modalities as indicated.    TREATMENT PLAN/GOALS:  Treatment plan: Continue supportive psychotherapy efforts and medications as indicated. Continue to challenge patterns of living conducive to pathology, strengthen defenses, promote problems solving, restore adaptive functioning and provide symptom relief. Treatment and medication options discussed during today's visit. Patient acknowledged and verbally consented to continue with current treatment plan and was educated on the importance of compliance with treatment and follow-up appointments.  Functional status:Good  Prognosis: Good  Progress: Continued improvement    Safety: No acute safety concerns.   Psychotherapy:      30 minutes of supportive psychotherapy with goal to strengthen defenses, promote problems solving, restore adaptive functioning and provide symptom relief. The therapeutic alliance was strengthened to encourage the patient to express their thoughts and feelings. Esteem building was enhanced through praise, reassurance, normalizing and encouragement. Coping skills were enhanced to build distress tolerance skills and emotional regulation. Allowed patient to freely discuss issues without interruption or judgement with unconditional positive regard, active listening skills, and empathy. Provided a safe, confidential environment to facilitate the development of a positive  therapeutic relationship and encourage open, honest communication. Assisted patient in identifying risk factors which would indicate the need for higher level of care including thoughts to harm self or others and/or self-harming behavior and encouraged patient to contact this office, call 911, or present to the nearest emergency room should any of these events occur. Assisted patient in processing session content; acknowledged and normalized patient’s thoughts, feelings, and concerns by utilizing a person-centered approach in efforts to build appropriate rapport and a positive therapeutic relationship with open and honest communication. Patient given education on medication side effects, diagnosis/illness and relapse symptoms. Plan to continue supportive psychotherapy in next appointment to provide symptom relief.      Risk Assessment: Risk of self-harm acutely and chronically is moderate.    Risk factors include anxiety disorder, mood disorder, and recent psychosocial stressors.   Protective factors include no family history, denies access to guns/weapons, no present SI, no history of suicide attempts or self-harm in the past, minimal AODA, healthcare seeking, future orientation, willingness to engage in care.    Risk assessment could be further elevated in the event of treatment noncompliance and/or AODA.  Labs/studies: No labs/studies ordered at this time  Medications:   New Medications Ordered This Visit   Medications    escitalopram (Lexapro) 5 MG tablet     Sig: Take 1 tablet by mouth Daily.     Dispense:  30 tablet     Refill:  1      Medication Education:   INDERAL (PROPANOLOL) Risks, benefits, alternatives discussed with patient including dizziness, sedation, falls, low blood pressure, low heart rate, possible exacerbation of asthma.  After discussion of these risks and benefits, the patient voiced understanding and agreed to proceed.  XANAX (ALPRAZOLAM) Risks, benefits, and alternatives discussed with  patient including sedation/falls risk, dizziness, disinhibition, headache, fatigue, dry mouth, blurred vision, constipation, nausea, diarrhea, heartburn, unusual taste in mouth, urinary retention, increased appetite, restlessness, sexual dysfunction, sweating, weight gain, cardiac arrhythmias, seizures, and fall risk.  After discussion of these risks and benefits, patient voiced understanding and agreed to proceed.  Abdon reviewed, UDS ordered, and controlled substance agreement signed & witnessed.  LEXAPRO (ESCITALOPRAM)  Risks, benefits, alternatives discussed with patient including GI upset, nausea vomiting diarrhea, theoretical decrease of seizure threshold predisposing the patient to a slightly higher seizure risk, headaches, sexual dysfunction, serotonin syndrome, bleeding risk.  After discussion of these risks and benefits, the patient voiced understanding and agreed to proceed.      Follow-up: Return in about 1 month (around 10/13/2023) for Next scheduled follow up.         This document has been electronically signed by VALENTIN Momin  September 20, 2023 18:40 EDT    Please note that portions of this note were completed with a voice recognition program.  Copied text in this note has been reviewed and is accurate as of 09/20/23

## 2023-09-13 NOTE — PATIENT INSTRUCTIONS
1.  Please return to clinic at your next scheduled visit.  Please contact the clinic ([phone]) at least 24 hours prior in the event you need to cancel.  2.  Do no harm to yourself or others.    3.  Avoid alcohol and drugs.    4.  Take all medications as prescribed.  Please contact the clinic with any concerns. If you are in need of medication refills, please call the clinic at [phone].    5. Should you want to get in touch with your provider, VALENTIN Momin, please contact the office ([phone]), and staff will be able to page [facility] directly.  6. In the event you have personal crisis, contact the following crisis numbers: Suicide Prevention Hotline 1-100.432.6756; [facility] Helpline 5-942-381-CAJD; [facility] Emergency Room [phone]; text HELLO to 045146; or 565.     SPECIFIC RECOMMENDATIONS:     1.      Medications discussed at this encounter:     New Medications Ordered This Visit   Medications    escitalopram (Lexapro) 5 MG tablet     Sig: Take 1 tablet by mouth Daily.     Dispense:  30 tablet     Refill:  1                       2.      Psychotherapy recommendations: We will continue therapy at future visits.     3.     Return to clinic: Return in about 1 month (around 10/13/2023) for Next scheduled follow up.

## 2023-10-16 ENCOUNTER — OFFICE VISIT (OUTPATIENT)
Dept: BEHAVIORAL HEALTH | Facility: CLINIC | Age: 28
End: 2023-10-16
Payer: COMMERCIAL

## 2023-10-16 VITALS
DIASTOLIC BLOOD PRESSURE: 78 MMHG | HEART RATE: 65 BPM | SYSTOLIC BLOOD PRESSURE: 109 MMHG | WEIGHT: 184.2 LBS | OXYGEN SATURATION: 100 % | HEIGHT: 62 IN | BODY MASS INDEX: 33.9 KG/M2

## 2023-10-16 DIAGNOSIS — F41.0 PANIC ATTACKS: ICD-10-CM

## 2023-10-16 DIAGNOSIS — F41.1 GAD (GENERALIZED ANXIETY DISORDER): ICD-10-CM

## 2023-10-16 DIAGNOSIS — F32.0 CURRENT MILD EPISODE OF MAJOR DEPRESSIVE DISORDER WITHOUT PRIOR EPISODE: ICD-10-CM

## 2023-10-16 DIAGNOSIS — F90.0 ATTENTION DEFICIT HYPERACTIVITY DISORDER (ADHD), PREDOMINANTLY INATTENTIVE TYPE: Primary | ICD-10-CM

## 2023-10-16 RX ORDER — GUANFACINE 1 MG/1
1 TABLET ORAL NIGHTLY
COMMUNITY

## 2023-10-16 RX ORDER — ESCITALOPRAM OXALATE 10 MG/1
10 TABLET ORAL DAILY
Qty: 30 TABLET | Refills: 2 | Status: SHIPPED | OUTPATIENT
Start: 2023-10-16 | End: 2024-10-15

## 2023-10-16 NOTE — PROGRESS NOTES
"[facility] [place] [facility]/Psychiatry  Medication Management Follow-up    Referring Provider:  No referring provider defined for this encounter.    Vital Signs:   /78   Pulse 65   Ht 157.5 cm (62\")   Wt 83.6 kg (184 lb 3.2 oz)   SpO2 100%   BMI 33.69 kg/m²     Chief Complaint: ADHD.  Anxiety.  Panic attacks    History of Present Illness:   Letty Glez is a 27 y.o. female who presents today for follow-up and medication management for:    ICD-10-CM ICD-9-CM   1. Attention deficit hyperactivity disorder (ADHD), predominantly inattentive type  F90.0 314.00   2. Current mild episode of major depressive disorder without prior episode  F32.0 296.21   3. YOLA (generalized anxiety disorder)  F41.1 300.02   4. Panic attacks  F41.0 300.01       10/16/2023 Patient is taking medications as prescribed and is tolerating them well.   Patient states that she feels like Lexapro is helping, we are discussing a potential increase in the dose as she has been on it for at least 1 month.  Panic attacks have significantly decreased with Lexapro.  Xanax does help with panic attacks but she has not needed to use it as frequently.  She does feel like many of her symptoms are exacerbated with situational stressors.  Depression  Visit Type: follow-up (Depression, YOLA, ADHD, Panic Attacks)  Patient presents with the following symptoms: anhedonia, decreased concentration, depressed mood, excessive worry, feelings of hopelessness, feelings of worthlessness, irritability, nervousness/anxiety, panic and psychomotor agitation.  Patient is not experiencing: fatigue, suicidal ideas, suicidal planning and thoughts of death.  Frequency of symptoms: most days   Severity: causing significant distress   Sleep quality: fair  Denies suicidal ideation.  Denies AVH.  We will continue to monitor for mood, behavior, and safety.    Record Review is below for 10/16/2023 : I have thoroughly reviewed the patient's electronic medical record to include " "previous encounters, care everywhere, notes, medications, labs, EDU and UDS (if applicable), imaging, and EKG's.  Pertinent information is included in this note.  1/4/2023 TSH and CBC are both reassuring and within normal limits, ALT is elevated at 46, however remaining CMP is reassuring, no other labs, imaging, procedures, EKGs in record.   EKG Results:   None in record  Head Imaging:  None in record      09/13/2023 Patient is taking medications as prescribed and is tolerating them well.   \"I feel good mood-wise\" continues to have significant anxiety.  She is wondering if sometimes the Adderall was making anxiety worse.  We have tried nonstimulant medications for ADHD.  We are discussing a more long-term management for generalized anxiety disorder and panic attacks.  She is willing to trial this.  Has been having major panic attacks sometimes daily.  Xanax does help with panic attacks.  She is only taking it occasionally and if absolutely necessary.  She believes that these symptoms are decreasing her ability to focus and concentrate.  Denies suicidal ideation.  Denies AVH.  We will continue to monitor for mood, behavior, and safety.  Start Lexapro 5 mg daily.  Discontinue guanfacine 1mg at bedtime  Continue propanolol 10mg bid prn for anxiety/panic attacks  Continue alprazolam 0.25mg qd prn for anxiety/panic attacks qty. 10  Follow-up 1 month    Record Review is below for 09/13/2023 : I have thoroughly reviewed the patient's electronic medical record to include previous encounters, care everywhere, notes, medications, labs, EDU and UDS (if applicable), imaging, and EKG's.  Pertinent information is included in this note.  1/4/2023 TSH and CBC are both reassuring and within normal limits, ALT is elevated at 46, however remaining CMP is reassuring, no other labs, imaging, procedures, EKGs in record.   EKG Results: None in record  Head Imaging:  None in record    08/23/2023 Patient is taking medications as " "prescribed and is tolerating them well.   Feels like she has been driving the \"struggle bus\" for a while. She has taken Xanax a couple times for panic attacks and it has helped. Has not been taking Adderall XR for about 2 weeks, when the dose was increased to 15mg she said it was \"too much\" and she also was feeling like the 10mg was maybe causing mood reactivity.   Depression  Visit Type: follow-up (Depression, YOLA, ADHD, Panic Attacks)  Patient presents with the following symptoms: anhedonia, decreased concentration, depressed mood, excessive worry, feelings of hopelessness, feelings of worthlessness, irritability, nervousness/anxiety, panic and psychomotor agitation.  Patient is not experiencing: fatigue, suicidal ideas, suicidal planning and thoughts of death.  Frequency of symptoms: most days   Severity: causing significant distress   Sleep quality: fair  Denies suicidal ideation.  Denies AVH.  We will continue to monitor for mood, behavior, and safety.  Discontinue Adderall  Start guanfacine 1mg at bedtime  Continue propanolol 10mg bid prn for anxiety/panic attacks  Continue alprazolam 0.25mg qd prn for anxiety/panic attacks qty. 10  Follow-up 1 month    Record Review is below for 08/23/2023 : I have thoroughly reviewed the patient's electronic medical record to include previous encounters, care everywhere, notes, medications, labs, EDU and UDS (if applicable), imaging, and EKG's.  Pertinent information is included in this note.  1/4/2023 TSH and CBC are both reassuring and within normal limits, ALT is elevated at 46, however remaining CMP is reassuring, no other labs, imaging, procedures, EKGs in record.   EKG Results: None in record  Head Imaging:  None in record    07/19/2023 Patient is taking medications as prescribed and is tolerating them well.   She has been battling increased anxiety. This is coming in waves, some days are better than others. She is forgetting things and misplacing things. She is " "really frustrated with herself. She does not want to start a SSRI to target anxiety. She is having panic attacks nearly daily, sense of impending doom, her hand feels numb.   Adderall XR was effective when we first started medication, now it wears off quickly.   Denies suicidal ideation.  Denies AVH.  We will continue to monitor for mood, behavior, and safety.  Increase Adderall XR to 15mg daily  Continue propanolol 10mg bid prn for anxiety/panic attacks  Start alprazolam 0.25mg qd prn for anxiety/panic attacks qty. 10  Discontinue hydroxyzine  Follow-up 1 month    Record Review is below for 07/19/2023 : I have thoroughly reviewed the patient's electronic medical record to include previous encounters, care everywhere, notes, medications, labs, EDU and UDS (if applicable), imaging, and EKG's.  Pertinent information is included in this note.  1/4/2023 TSH and CBC are both reassuring and within normal limits, ALT is elevated at 46, however remaining CMP is reassuring, no other labs, imaging, procedures, EKGs in record.   EKG Results: None in record  Head Imaging:  None in record    7/13/2023 Telephone encounter patient is experiencing intense anxiety, she is having panic attacks despite taking hydroxyzine. She reports that she has taken buspar before and it made her feel like a \"zombie.\" We are going to discuss SSRIs at next appt for treating anxiety.      Start propanolol 10mg bid prn for anxiety/panic attacks  Continue follow up appointment with me   Appointment with Ginger Thakur APRN (07/19/2023)     06/13/2023 Patient is taking medications as prescribed and is tolerating them well. Work is really good. She is under extreme stress because there are 6 people in her house right now and they also brought their dog with them. He boyfriend has recently proposed and she accepted, however, she is somewhat ambivalent about what she wants to do for their future. She does feel like her anxiety and ADHD symptoms are " "well controlled with Adderall XR.  Denies suicidal ideation. Denies AVH. We will continue to monitor for mood, behavior, and safety.  Continue Adderall XR 10 mg daily  Follow-up 1 month    Record Review is below for 06/13/2023 : I have thoroughly reviewed the patient's electronic medical record to include previous encounters, care everywhere, notes, medications, labs, EDU and UDS (if applicable), imaging, and EKG's.  Pertinent information is included in this note.  1/4/2023 TSH and CBC are both reassuring and within normal limits, ALT is elevated at 46, however remaining CMP is reassuring, no other labs, imaging, procedures, EKGs in record.   EKG Results: None in record    05/02/2023 Patient is taking medications as prescribed and is tolerating them well. \"I am good\"  \"I am doing really good.\" She is enjoying her new schedule and she has more energy.  Her mood has improved. Sleep is good, she is feeling more rested. She is able to wake up and get ready for work. Her new hours are a lot better in this new position. Denies suicidal ideation. Denies AVH. We will continue to monitor for mood, behavior, and safety.  Continue Adderall XR 10 mg daily  Ambulatory referral for psychology.  Referral for evaluation, treatment, and individual psychotherapy to Emory Guajardo, PhD  [phone]  Follow-up 6 weeks    Record Review is below for 05/02/2023 : I have thoroughly reviewed the patient's electronic medical record to include previous encounters, care everywhere, notes, medications, labs, EDU and UDS (if applicable), imaging, and EKG's.  Pertinent information is included in this note.  1/4/2023 TSH and CBC are both reassuring and within normal limits, ALT is elevated at 46, however remaining CMP is reassuring, no other labs, imaging, procedures, EKGs in record.   EKG Results: None in record    03/21/2023Amiguel angel Glez is a 27 y.o. female who presents today for follow up for ADHD and anxiety.   Letty started her new " "job, things are going really well. She thinks this job is maybe going to be more stress free.  She is able to able to get dressed up and put on her make-up and this is helping her feel much better. Patient states the increased dose of Adderall XR 10mg is definitely improving her ADHD symptoms. She feels like she in not \"waning\" at the end of the day. Focus, concentration, and energy levels are improved. She is starting to train for a half-marathon.  Anxiety has also improved. She states that she no longer feels that she has \"90 tabs\" open in her brain.  Nervous about meeting her boyfriend's mom is coming and going to stay for a month in June. We may be scheduling some appointments for therapy during this time as an outlet for anxiety. Patient has been taking Adderall XR daily and is tolerating it well.  Denies depressed mood.  Denies suicidal ideation.  Denies AVH.  We will continue to monitor for mood, behavior, and safety.  Continue Adderall XR 10 mg daily  Follow-up 6 weeks     Record Review is below for 03/21/2023 : I have thoroughly reviewed the patient's electronic medical record to include previous encounters, care everywhere, notes, medications, labs, EDU and UDS (if applicable), imaging, and EKG's.  Pertinent information is included in this note.  1/4/2023 TSH and CBC are both reassuring and within normal limits, ALT is elevated at 46, however remaining CMP is reassuring, no other labs, imaging, procedures, EKGs in record.   EKG Results: None in record    02/20/2023Amiguela ngel Glez is a 27 y.o. female who presents today for follow up for ADHD and anxiety.  Patient reports that her focus and concentration have greatly improved with the initiation of Adderall for treatment of her ADHD.  She described taking the medication and felt like it made her mind more \"quiet.\"  Patient says that she is already feeling like she can complete tasks more appropriately and feels more organized.  She does feel like she could " use a little bit more help as she is going to be starting a new position on [employer].  Denies SI/HI/AVH.  Denies depressed mood.  PHQ-9 is 2 and YOLA-7 is 0.   Increase Adderall XR to 10 mg daily  Patient will return Adderall XR 5 mg to pharmacy  Follow-up 1 month    Record Review is below for 02/20/2023 : I have thoroughly reviewed the patient's electronic medical record to include previous encounters, care everywhere, notes, medications, labs, EDU and UDS (if applicable), imaging, and EKG's.  Pertinent information is included in this note.  1/4/2023 TSH and CBC are both reassuring and within normal limits, ALT is elevated at 46, however remaining CMP is reassuring, no other labs, imaging, procedures, EKGs in record.     Letty Glez is a 27 y.o. female who presents today for follow up for ADHD, and anxiety. Patient is still having issues with focus and concentration. She has a compelling childhood ADHD assessment however was never referred or officially tested. She also has a compelling adult presentation of ADHD symptoms.  We have been having difficulty finding an opening for neuropsychological testing. Denies depressed mood, does have symptoms of anxiety.  She states that this week has been even worse for her.  She is losing things, having irritability, and has also found that she sometimes purchases things that she already has because she forgot that she purchased them.  She is ultimately concerned about getting the symptoms under control because she is about to start a new job.  We may need to begin ADHD medication in order to not delay treatment any further for her.  We discussed the need for satisfactory urine drug screen and sign controlled substances agreement.  Denies SI/HI/AVH. PHQ-9, 4 YOLA-7, 4 and the PHQ-9 is congruent with assessment and presentation, however the YOLA-7 presents as somewhat incongruent with her anxiety symptoms.    UDS is satisfactory  POC Urine Drug Screen [facility]  (01/30/2023 14:53)  EDU is satisfactory  MICROFILM RECORDS - SCAN - MANUAL [place] EDU REPORT 01/01/2023-01/24/2023 (01/26/2023)  [place]  MICROFILM RECORDS - SCAN - [facility] SIGNED [facility] CONTROLLED SUB CONTRACT (01/30/2023)  Start Adderall XR 5 mg  Follow-up 3 weeks    Record Review is below for 01/30/2023 : I have thoroughly reviewed the patient's electronic medical record to include previous encounters, care everywhere, notes, medications, labs, EDU and UDS (if applicable), imaging, and EKG's.  Pertinent information is included in this note.  1/4/2023 TSH and CBC are both reassuring and within normal limits, ALT is elevated at 46, however remaining CMP is reassuring, no other labs, imaging, procedures, EKGs in record.      1/23/2023 Letty Glez is a 27 y.o. female who presents today for follow up For ADHD, and anxiety. Patient is still having issues with focus and concentration, with hyperactivity. She has a compelling childhood ADHD assessment however was never referred or officially tested. She also has a compelling adult presentation of ADHD symptoms. Denies depressed mood, does have symptoms of anxiety.  Denies SI/HI/AVH. PHQ-9, 4 YOLA-7, 5 and the PHQ-9 is congruent with assessment and presentation, however the YOLA-7 presents as somewhat incongruent with her anxiety symptoms.  We discussed the possibility of starting Wellbutrin to target ADHD symptoms.  Patient has had extremely negative experience with medications in the past.  She is hesitant to begin a medication while also starting a new job and would like to wait to confirm ADHD diagnosis.    Record Review is below for 01/23/2023 : I have thoroughly reviewed the patient's electronic medical record to include previous encounters, care everywhere, notes, medications, labs, EDU and UDS (if applicable), imaging, and EKG's.  Pertinent information is included in this note.  TSH and CBC are both reassuring and within normal limits, ALT is  elevated at 46, however remaining CMP is reassuring, no other labs, imaging, procedures, EKGs in record.  No records for 6-month date range in [facility].    1/9/2023 I am referring her to Dr. Petty, PhD for testing to confirm diagnosis  I expect that she will have a positive diagnosis of ADHD  I would also expect that she will have a satisfactory UDS  Follow-up 1 month  Presentation seems most consistent with generalized anxiety disorder and ADHD DSM-5 criteria.  No medication orders at this time as patient does not wish to start medication and wishes to confirm the diagnosis with testing.  We discussed that treatment of her ADHD could potentially help eliminate her anxiety while increasing concentration and focus.  Will refer to Dr. Petty, PhD for testing to confirm diagnosis of ADHD.  I would expect that she will have a satisfactory UDS and sign a CSA should we consider controlled substances and treatment.  I extensively discussed this with the patient. Patient verbalized understanding and is agreeable to this plan.  Addressed all questions and concerns.  Continue psychotherapeutic modalities as indicated.    Psychiatric/Behavioral Health History  Began Treatment: 2019  Diagnoses: Anxiety  Psychiatrist: Denies, Family dr ordered medicine for anxiety (Celexa) but she did not take it  Therapist: Denies  Admission History: Denies  Medication Trials: buspar, hydroxyzine, propanolol  Self Harm: Denies  Suicide Attempts: Denies  Trauma: Denies    MENTAL STATUS EXAM   General Appearance:  Cleanly groomed and dressed and well developed  Eye Contact:  Good eye contact  Attitude:  Cooperative and polite  Motor Activity:  Normal gait, posture  Speech:  Normal rate, tone, volume  Mood and affect:  Normal, pleasant and euthymic  Hopelessness:  Denies  Thought Process:  Logical and goal-directed  Associations/ Thought Content:  No delusions  Hallucinations:  None  Suicidal Ideations:  Not present  Homicidal Ideation:  Not  present  Sensorium:  Alert  Orientation:  Person, place, time and situation  Immediate Recall, Recent, and Remote Memory:  Intact  Attention Span/ Concentration:  Good  Fund of Knowledge:  Appropriate for age and educational level  Intellectual Functioning:  Average range  Insight:  Good  Judgement:  Good  Reliability:  Good  Impulse Control:  Good          Review of systems is negative except as noted in HPI.  Labs:  WBC   Date Value Ref Range Status   08/29/2023 4.69 3.40 - 10.80 10*3/mm3 Final     Platelets   Date Value Ref Range Status   08/29/2023 234 140 - 450 10*3/mm3 Final     Hemoglobin   Date Value Ref Range Status   08/29/2023 14.3 12.0 - 15.9 g/dL Final     Hematocrit   Date Value Ref Range Status   08/29/2023 41.8 34.0 - 46.6 % Final     Glucose   Date Value Ref Range Status   01/04/2023 86 65 - 99 mg/dL Final     Creatinine   Date Value Ref Range Status   01/04/2023 0.74 0.57 - 1.00 mg/dL Final     ALT (SGPT)   Date Value Ref Range Status   01/04/2023 46 (H) 1 - 33 U/L Final     AST (SGOT)   Date Value Ref Range Status   01/04/2023 31 1 - 32 U/L Final     BUN   Date Value Ref Range Status   01/04/2023 13 6 - 20 mg/dL Final     eGFR   Date Value Ref Range Status   01/04/2023 113.2 >60.0 mL/min/1.73 Final     Comment:     National Kidney Foundation and American Society of Nephrology (ASN) Task Force recommended calculation based on the Chronic Kidney Disease Epidemiology Collaboration (CKD-EPI) equation refit without adjustment for race.     TSH   Date Value Ref Range Status   01/04/2023 1.070 0.270 - 4.200 uIU/mL Final      Pain Management Panel          Latest Ref Rng & Units 1/30/2023   Pain Management Panel   Amphetamine, Urine Qual Negative Negative    Barbiturates Screen, Urine Negative Negative    Benzodiazepine Screen, Urine Negative Negative    Buprenorphine, Screen, Urine Negative Negative    Cocaine Screen, Urine Negative Negative    Methadone Screen , Urine Negative Negative     Methamphetamine, Ur Negative Negative         Imaging Results:  No Images in the past 120 days found..    Current Medications:   Current Outpatient Medications   Medication Sig Dispense Refill    ALBUTEROL IN       cetirizine (zyrTEC) 10 MG tablet Take 1 tablet by mouth Every Night.      dicyclomine (BENTYL) 10 MG capsule Take 1 capsule by mouth 3 (Three) Times a Day As Needed (loose stools/ IBS). (Patient taking differently: Take 1 capsule by mouth 3 (Three) Times a Day As Needed (loose stools/ IBS). prn) 60 capsule 2    guanFACINE (TENEX) 1 MG tablet Take 1 tablet by mouth Every Night.      multivitamin with minerals tablet tablet Take 1 tablet by mouth Daily.      ALPRAZolam (Xanax) 0.25 MG tablet Take 1 tablet by mouth Daily As Needed for Anxiety (Panic Attacks; use sparingly). (Patient not taking: Reported on 8/29/2023) 10 tablet 0    escitalopram (Lexapro) 10 MG tablet Take 1 tablet by mouth Daily. 30 tablet 2    propranolol (INDERAL) 10 MG tablet Take 1 tablet by mouth 2 (Two) Times a Day As Needed (Anxiety). (Patient not taking: Reported on 8/29/2023) 60 tablet 1    sodium chloride 0.9 % nebulizer solution 0.82 mL with albuterol (5 MG/ML) 0.5% nebulizer solution 0.9 mg Take 15 mg/hr by nebulization Continuous. (Patient not taking: Reported on 9/13/2023)       No current facility-administered medications for this visit.       Problem List:  Patient Active Problem List   Diagnosis    Concentration deficit    Diarrhea    Thyroid disorder screen    Irritable bowel syndrome with diarrhea       Allergy:   Allergies   Allergen Reactions    Mixed Feathers Other (See Comments)     Told as child to not take (CHICKEN FEATHERS)    Codeine Nausea And Vomiting    Haemophilus Influenzae Vaccines Other (See Comments)     ALLERGIC TO CHICKEN FEATHERS AND CHICKEN BASED INGREDIENTS         Discontinued Medications:  Medications Discontinued During This Encounter   Medication Reason    escitalopram (Lexapro) 5 MG tablet Dose  adjustment         PLAN:   Presentation seems most consistent with DSM-V criteria for:  Diagnoses and all orders for this visit:    1. Attention deficit hyperactivity disorder (ADHD), predominantly inattentive type (Primary)    2. Current mild episode of major depressive disorder without prior episode  -     escitalopram (Lexapro) 10 MG tablet; Take 1 tablet by mouth Daily.  Dispense: 30 tablet; Refill: 2    3. YOLA (generalized anxiety disorder)  -     escitalopram (Lexapro) 10 MG tablet; Take 1 tablet by mouth Daily.  Dispense: 30 tablet; Refill: 2    4. Panic attacks  -     escitalopram (Lexapro) 10 MG tablet; Take 1 tablet by mouth Daily.  Dispense: 30 tablet; Refill: 2       Increase Lexapro to 10 mg daily.  Continue guanfacine 1 mg at bedtime  Continue propanolol 10 mg bid prn for anxiety/panic attacks  Continue alprazolam 0.25 mg qd prn for anxiety/panic attacks qty. 10  Follow-up 1 month  Discussed medication options and treatment plan of prescribed medication as well as the risks, benefits, and side effects.  Patient verbalized understanding and is agreeable to this plan.   Patient is agreeable to call the office with any worsening of symptoms or onset of side effects.   Patient is agreeable to call 911 or go to the nearest ER should he/she begin having SI/HI.   Addressed all questions and concerns.    Continue psychotherapeutic modalities as indicated.    TREATMENT PLAN/GOALS:  Treatment plan: Continue supportive psychotherapy efforts and medications as indicated. Continue to challenge patterns of living conducive to pathology, strengthen defenses, promote problems solving, restore adaptive functioning and provide symptom relief. Treatment and medication options discussed during today's visit. Patient acknowledged and verbally consented to continue with current treatment plan and was educated on the importance of compliance with treatment and follow-up appointments.  Functional status:Good  Prognosis:  Good  Progress: Continued improvement    Safety: No acute safety concerns.   Psychotherapy:      30 minutes of supportive psychotherapy with goal to strengthen defenses, promote problems solving, restore adaptive functioning and provide symptom relief. The therapeutic alliance was strengthened to encourage the patient to express their thoughts and feelings. Esteem building was enhanced through praise, reassurance, normalizing and encouragement. Coping skills were enhanced to build distress tolerance skills and emotional regulation. Allowed patient to freely discuss issues without interruption or judgement with unconditional positive regard, active listening skills, and empathy. Provided a safe, confidential environment to facilitate the development of a positive therapeutic relationship and encourage open, honest communication. Assisted patient in identifying risk factors which would indicate the need for higher level of care including thoughts to harm self or others and/or self-harming behavior and encouraged patient to contact this office, call 911, or present to the nearest emergency room should any of these events occur. Assisted patient in processing session content; acknowledged and normalized patient’s thoughts, feelings, and concerns by utilizing a person-centered approach in efforts to build appropriate rapport and a positive therapeutic relationship with open and honest communication. Patient given education on medication side effects, diagnosis/illness and relapse symptoms. Plan to continue supportive psychotherapy in next appointment to provide symptom relief.      Risk Assessment: Risk of self-harm acutely and chronically is moderate.    Risk factors include anxiety disorder, mood disorder, and recent psychosocial stressors.   Protective factors include no family history, denies access to guns/weapons, no present SI, no history of suicide attempts or self-harm in the past, minimal AODA, healthcare  seeking, future orientation, willingness to engage in care.    Risk assessment could be further elevated in the event of treatment noncompliance and/or AODA.  Labs/studies: No labs/studies ordered at this time  Medications:   New Medications Ordered This Visit   Medications    escitalopram (Lexapro) 10 MG tablet     Sig: Take 1 tablet by mouth Daily.     Dispense:  30 tablet     Refill:  2      Medication Education:   GUANFACINE (INTUNIV/TENEX) Risks, benefits, alternatives discussed with patient and mom including sedation, dizziness/falls risk, GI upset, dry mouth, constipation, hypotension. After discussion of these risks and benefits, the patient and mom voiced understanding and agreed to proceed.  INDERAL (PROPANOLOL) Risks, benefits, alternatives discussed with patient including dizziness, sedation, falls, low blood pressure, low heart rate, possible exacerbation of asthma.  After discussion of these risks and benefits, the patient voiced understanding and agreed to proceed.  XANAX (ALPRAZOLAM) Risks, benefits, and alternatives discussed with patient including sedation/falls risk, dizziness, disinhibition, headache, fatigue, dry mouth, blurred vision, constipation, nausea, diarrhea, heartburn, unusual taste in mouth, urinary retention, increased appetite, restlessness, sexual dysfunction, sweating, weight gain, cardiac arrhythmias, seizures, and fall risk.  After discussion of these risks and benefits, patient voiced understanding and agreed to proceed.  [facility]don reviewed, UDS ordered, and controlled substance agreement signed & witnessed.      Follow-up: Return in about 6 weeks (around 11/27/2023) for Next scheduled follow up.         This document has been electronically signed by VALENTIN Momin  October 28, 2023 13:11 EDT    Please note that portions of this note were completed with a voice recognition program.  Copied text in this note has been reviewed and is accurate as of 10/28/23

## 2023-10-28 NOTE — PATIENT INSTRUCTIONS
1.  Please return to clinic at your next scheduled visit.  Please contact the clinic ([phone]) at least 24 hours prior in the event you need to cancel.  2.  Do no harm to yourself or others.    3.  Avoid alcohol and drugs.    4.  Take all medications as prescribed.  Please contact the clinic with any concerns. If you are in need of medication refills, please call the clinic at [phone].    5. Should you want to get in touch with your provider, VALENTIN Momin, please contact the office ([phone]), and staff will be able to page [facility] directly.  6. In the event you have personal crisis, contact the following crisis numbers: Suicide Prevention Hotline 1-690.300.1109; [facility] Helpline 8-273-853-HNTO; Psychiatric Emergency Room [phone]; text HELLO to 563997; or 640.     SPECIFIC RECOMMENDATIONS:     1.      Medications discussed at this encounter:     New Medications Ordered This Visit   Medications    escitalopram (Lexapro) 10 MG tablet     Sig: Take 1 tablet by mouth Daily.     Dispense:  30 tablet     Refill:  2                       2.      Psychotherapy recommendations: We will continue therapy at future visits.     3.     Return to clinic: Return in about 6 weeks (around 11/27/2023) for Next scheduled follow up.

## 2023-11-15 DIAGNOSIS — F41.1 GAD (GENERALIZED ANXIETY DISORDER): ICD-10-CM

## 2023-11-15 DIAGNOSIS — F41.0 PANIC ATTACKS: ICD-10-CM

## 2023-11-15 DIAGNOSIS — F32.0 CURRENT MILD EPISODE OF MAJOR DEPRESSIVE DISORDER WITHOUT PRIOR EPISODE: ICD-10-CM

## 2023-11-15 RX ORDER — ESCITALOPRAM OXALATE 5 MG/1
5 TABLET ORAL DAILY
Qty: 30 TABLET | Refills: 1 | OUTPATIENT
Start: 2023-11-15

## 2023-11-15 NOTE — TELEPHONE ENCOUNTER
escitalopram (Lexapro) 10 MG tablet (10/16/2023)     Pt currently takes 10mg, therefore refill for 5mg may not be appropriate. Order refused and pended.

## 2023-11-28 ENCOUNTER — TELEMEDICINE (OUTPATIENT)
Dept: BEHAVIORAL HEALTH | Facility: CLINIC | Age: 28
End: 2023-11-28
Payer: COMMERCIAL

## 2023-11-28 DIAGNOSIS — F41.0 PANIC ATTACKS: ICD-10-CM

## 2023-11-28 DIAGNOSIS — F90.0 ATTENTION DEFICIT HYPERACTIVITY DISORDER (ADHD), PREDOMINANTLY INATTENTIVE TYPE: ICD-10-CM

## 2023-11-28 DIAGNOSIS — F41.1 GAD (GENERALIZED ANXIETY DISORDER): ICD-10-CM

## 2023-11-28 DIAGNOSIS — Z79.899 MEDICATION MANAGEMENT: ICD-10-CM

## 2023-11-28 DIAGNOSIS — F32.0 CURRENT MILD EPISODE OF MAJOR DEPRESSIVE DISORDER WITHOUT PRIOR EPISODE: Primary | ICD-10-CM

## 2023-11-28 RX ORDER — LISDEXAMFETAMINE DIMESYLATE CAPSULES 30 MG/1
30 CAPSULE ORAL EVERY MORNING
Qty: 30 CAPSULE | Refills: 0 | Status: SHIPPED | OUTPATIENT
Start: 2023-11-28

## 2023-11-28 NOTE — PROGRESS NOTES
[facility] [place]  Medication Management Follow-up  Virtual MyChart Visit  This provider is located at [address] [place] [address], [address], [place] [id number]. The Patient is located at home. Patient is being seen remotely using MyChart. Patient is being seen via telehealth and confirm that they are in a secure environment for this session. The patient's condition being diagnosed/treated is appropriate for telemedicine. The provider identified herself as well as her credentials. They may refuse to be seen remotely at any time. The electronic data is encrypted and password protected, and the patient has been advised of the potential risks to privacy not withstanding such measures. Virtual visit via Zoom audio and video due to the COVID-19 pandemic.  Patient is accepting of and agreeable to visit.  The visit consisted of the patient and I. PT Identifiers used: Name and .   Referring Provider:  No referring provider defined for this encounter.    Vital Signs:   There were no vitals taken for this visit.    Chief Complaint: Depression. ADHD. Anxiety.     History of Present Illness:   Letty Glez is a 28 y.o. female who presents today for follow-up and medication management for:    ICD-10-CM ICD-9-CM   1. Current mild episode of major depressive disorder without prior episode  F32.0 296.21   2. YOLA (generalized anxiety disorder)  F41.1 300.02   3. Panic attacks  F41.0 300.01   4. Attention deficit hyperactivity disorder (ADHD), predominantly inattentive type  F90.0 314.00   5. Medication management  Z79.899 V58.69       2023 Patient is taking medications as prescribed and is tolerating them well.   Struggle with starting things and getting things going. Anxiety is well-controlled with lexapro. Has been procrastinating. She is an  for someone else and doesn't forget things for them, but often tends to forget things for her life. We are discussing alternative medication for ADHD.    Depression  Visit Type: follow-up (Depression, YOLA, ADHD, Panic Attacks)  Patient presents with the following symptoms: anhedonia, decreased concentration, depressed mood, excessive worry, feelings of hopelessness, feelings of worthlessness, irritability, nervousness/anxiety, panic and psychomotor agitation.  Patient is not experiencing: fatigue, suicidal ideas, suicidal planning and thoughts of death.  Frequency of symptoms: most days   Severity: causing significant distress   Sleep quality: fair  Denies suicidal ideation.  Denies AVH.  We will continue to monitor for mood, behavior, and safety.    Record Review is below for 11/28/2023 : I have thoroughly reviewed the patient's electronic medical record to include previous encounters, care everywhere, notes, medications, labs, EDU and UDS (if applicable), imaging, and EKG's.  Pertinent information is included in this note.  1/4/2023 TSH and CBC are both reassuring and within normal limits, ALT is elevated at 46, however remaining CMP is reassuring, no other labs, imaging, procedures, EKGs in record.   EKG Results:   None in record  Head Imaging:  None in record      10/16/2023 Patient is taking medications as prescribed and is tolerating them well.   Patient states that she feels like Lexapro is helping, we are discussing a potential increase in the dose as she has been on it for at least 1 month.  Panic attacks have significantly decreased with Lexapro.  Xanax does help with panic attacks but she has not needed to use it as frequently.  She does feel like many of her symptoms are exacerbated with situational stressors.  Depression  Visit Type: follow-up (Depression, YOLA, ADHD, Panic Attacks)  Patient presents with the following symptoms: anhedonia, decreased concentration, depressed mood, excessive worry, feelings of hopelessness, feelings of worthlessness, irritability, nervousness/anxiety, panic and psychomotor agitation.  Patient is not experiencing:  "fatigue, suicidal ideas, suicidal planning and thoughts of death.  Frequency of symptoms: most days   Severity: causing significant distress   Sleep quality: fair  Denies suicidal ideation.  Denies AVH.  We will continue to monitor for mood, behavior, and safety.  Increase Lexapro to 10 mg daily.  Continue guanfacine 1 mg at bedtime  Continue propanolol 10 mg bid prn for anxiety/panic attacks  Continue alprazolam 0.25 mg qd prn for anxiety/panic attacks qty. 10  Follow-up 1 month    Record Review is below for 10/16/2023 : I have thoroughly reviewed the patient's electronic medical record to include previous encounters, care everywhere, notes, medications, labs, EDU and UDS (if applicable), imaging, and EKG's.  Pertinent information is included in this note.  1/4/2023 TSH and CBC are both reassuring and within normal limits, ALT is elevated at 46, however remaining CMP is reassuring, no other labs, imaging, procedures, EKGs in record.   EKG Results:   None in record  Head Imaging:  None in record      09/13/2023 Patient is taking medications as prescribed and is tolerating them well.   \"I feel good mood-wise\" continues to have significant anxiety.  She is wondering if sometimes the Adderall was making anxiety worse.  We have tried nonstimulant medications for ADHD.  We are discussing a more long-term management for generalized anxiety disorder and panic attacks.  She is willing to trial this.  Has been having major panic attacks sometimes daily.  Xanax does help with panic attacks.  She is only taking it occasionally and if absolutely necessary.  She believes that these symptoms are decreasing her ability to focus and concentrate.  Denies suicidal ideation.  Denies AVH.  We will continue to monitor for mood, behavior, and safety.  Start Lexapro 5 mg daily.  Discontinue guanfacine 1mg at bedtime  Continue propanolol 10mg bid prn for anxiety/panic attacks  Continue alprazolam 0.25mg qd prn for anxiety/panic attacks " "qty. 10  Follow-up 1 month    Record Review is below for 09/13/2023 : I have thoroughly reviewed the patient's electronic medical record to include previous encounters, care everywhere, notes, medications, labs, EDU and UDS (if applicable), imaging, and EKG's.  Pertinent information is included in this note.  1/4/2023 TSH and CBC are both reassuring and within normal limits, ALT is elevated at 46, however remaining CMP is reassuring, no other labs, imaging, procedures, EKGs in record.   EKG Results: None in record  Head Imaging:  None in record    08/23/2023 Patient is taking medications as prescribed and is tolerating them well.   Feels like she has been driving the \"[employer] bus\" for a while. She has taken Xanax a couple times for panic attacks and it has helped. Has not been taking Adderall XR for about 2 weeks, when the dose was increased to 15mg she said it was \"too much\" and she also was feeling like the 10mg was maybe causing mood reactivity.   Depression  Visit Type: follow-up (Depression, YOLA, ADHD, Panic Attacks)  Patient presents with the following symptoms: anhedonia, decreased concentration, depressed mood, excessive worry, feelings of hopelessness, feelings of worthlessness, irritability, nervousness/anxiety, panic and psychomotor agitation.  Patient is not experiencing: fatigue, suicidal ideas, suicidal planning and thoughts of death.  Frequency of symptoms: most days   Severity: causing significant distress   Sleep quality: fair  Denies suicidal ideation.  Denies AVH.  We will continue to monitor for mood, behavior, and safety.  Discontinue Adderall  Start guanfacine 1 mg at bedtime  Continue propanolol 10mg bid prn for anxiety/panic attacks  Continue alprazolam 0.25mg qd prn for anxiety/panic attacks qty. 10  Follow-up 1 month    Record Review is below for 08/23/2023 : I have thoroughly reviewed the patient's electronic medical record to include previous encounters, care everywhere, notes, " "medications, labs, EDU and UDS (if applicable), imaging, and EKG's.  Pertinent information is included in this note.  1/4/2023 TSH and CBC are both reassuring and within normal limits, ALT is elevated at 46, however remaining CMP is reassuring, no other labs, imaging, procedures, EKGs in record.   EKG Results: None in record  Head Imaging:  None in record    07/19/2023 Patient is taking medications as prescribed and is tolerating them well.   She has been battling increased anxiety. This is coming in waves, some days are better than others. She is forgetting things and misplacing things. She is really frustrated with herself. She does not want to start a SSRI to target anxiety. She is having panic attacks nearly daily, sense of impending doom, her hand feels numb.   Adderall XR was effective when we first started medication, now it wears off quickly.   Denies suicidal ideation.  Denies AVH.  We will continue to monitor for mood, behavior, and safety.  Increase Adderall XR to 15mg daily  Continue propanolol 10mg bid prn for anxiety/panic attacks  Start alprazolam 0.25mg qd prn for anxiety/panic attacks qty. 10  Discontinue hydroxyzine  Follow-up 1 month    Record Review is below for 07/19/2023 : I have thoroughly reviewed the patient's electronic medical record to include previous encounters, care everywhere, notes, medications, labs, EDU and UDS (if applicable), imaging, and EKG's.  Pertinent information is included in this note.  1/4/2023 TSH and CBC are both reassuring and within normal limits, ALT is elevated at 46, however remaining CMP is reassuring, no other labs, imaging, procedures, EKGs in record.   EKG Results: None in record  Head Imaging:  None in record    7/13/2023 Telephone encounter patient is experiencing intense anxiety, she is having panic attacks despite taking hydroxyzine. She reports that she has taken buspar before and it made her feel like a \"zombie.\" We are going to discuss SSRIs at " "next appt for treating anxiety.   Start propanolol 10mg bid prn for anxiety/panic attacks  Continue follow up appointment with me   Appointment with Ginger Thakur APRN (07/19/2023)     06/13/2023 Patient is taking medications as prescribed and is tolerating them well. Work is really good. She is under extreme stress because there are 6 people in her house right now and they also brought their dog with them. He boyfriend has recently proposed and she accepted, however, she is somewhat ambivalent about what she wants to do for their future. She does feel like her anxiety and ADHD symptoms are well controlled with Adderall XR.  Denies suicidal ideation. Denies AVH. We will continue to monitor for mood, behavior, and safety.  Continue Adderall XR 10 mg daily  Follow-up 1 month    Record Review is below for 06/13/2023 : I have thoroughly reviewed the patient's electronic medical record to include previous encounters, care everywhere, notes, medications, labs, EDU and UDS (if applicable), imaging, and EKG's.  Pertinent information is included in this note.  1/4/2023 TSH and CBC are both reassuring and within normal limits, ALT is elevated at 46, however remaining CMP is reassuring, no other labs, imaging, procedures, EKGs in record.   EKG Results: None in record    05/02/2023 Patient is taking medications as prescribed and is tolerating them well. \"I am good\"  \"I am doing really good.\" She is enjoying her new schedule and she has more energy.  Her mood has improved. Sleep is good, she is feeling more rested. She is able to wake up and get ready for work. Her new hours are a lot better in this new position. Denies suicidal ideation. Denies AVH. We will continue to monitor for mood, behavior, and safety.  Continue Adderall XR 10 mg daily  Ambulatory referral for psychology.  Referral for evaluation, treatment, and individual psychotherapy to Emory Guajardo, PhD  [phone]  Follow-up 6 weeks    Record Review is " "below for 05/02/2023 : I have thoroughly reviewed the patient's electronic medical record to include previous encounters, care everywhere, notes, medications, labs, EDU and UDS (if applicable), imaging, and EKG's.  Pertinent information is included in this note.  1/4/2023 TSH and CBC are both reassuring and within normal limits, ALT is elevated at 46, however remaining CMP is reassuring, no other labs, imaging, procedures, EKGs in record.   EKG Results: None in record    03/21/2023Amiguel angel Glez is a 27 y.o. female who presents today for follow up for ADHD and anxiety.   Letty started her new job, things are going really well. She thinks this job is maybe going to be more stress free.  She is able to able to get dressed up and put on her make-up and this is helping her feel much better. Patient states the increased dose of Adderall XR 10mg is definitely improving her ADHD symptoms. She feels like she in not \"waning\" at the end of the day. Focus, concentration, and energy levels are improved. She is starting to train for a half-marathon.  Anxiety has also improved. She states that she no longer feels that she has \"90 tabs\" open in her brain.  Nervous about meeting her boyfriend's mom is coming and going to stay for a month in June. We may be scheduling some appointments for therapy during this time as an outlet for anxiety. Patient has been taking Adderall XR daily and is tolerating it well.  Denies depressed mood.  Denies suicidal ideation.  Denies AVH.  We will continue to monitor for mood, behavior, and safety.  Continue Adderall XR 10 mg daily  Follow-up 6 weeks     Record Review is below for 03/21/2023 : I have thoroughly reviewed the patient's electronic medical record to include previous encounters, care everywhere, notes, medications, labs, EDU and UDS (if applicable), imaging, and EKG's.  Pertinent information is included in this note.  1/4/2023 TSH and CBC are both reassuring and within normal " "limits, ALT is elevated at 46, however remaining CMP is reassuring, no other labs, imaging, procedures, EKGs in record.   EKG Results: None in record    02/20/2023Amiguel angel Glez is a 27 y.o. female who presents today for follow up for ADHD and anxiety.  Patient reports that her focus and concentration have greatly improved with the initiation of Adderall for treatment of her ADHD.  She described taking the medication and felt like it made her mind more \"quiet.\"  Patient says that she is already feeling like she can complete tasks more appropriately and feels more organized.  She does feel like she could use a little bit more help as she is going to be starting a new position on [email or web address].  Denies SI/HI/AVH.  Denies depressed mood.  PHQ-9 is 2 and YOLA-7 is 0.   Increase Adderall XR to 10 mg daily  Patient will return Adderall XR 5 mg to pharmacy  Follow-up 1 month    Record Review is below for 02/20/2023 : I have thoroughly reviewed the patient's electronic medical record to include previous encounters, care everywhere, notes, medications, labs, EDU and UDS (if applicable), imaging, and EKG's.  Pertinent information is included in this note.  1/4/2023 TSH and CBC are both reassuring and within normal limits, ALT is elevated at 46, however remaining CMP is reassuring, no other labs, imaging, procedures, EKGs in record.     Letty Glez is a 27 y.o. female who presents today for follow up for ADHD, and anxiety. Patient is still having issues with focus and concentration. She has a compelling childhood ADHD assessment however was never referred or officially tested. She also has a compelling adult presentation of ADHD symptoms.  We have been having difficulty finding an opening for neuropsychological testing. Denies depressed mood, does have symptoms of anxiety.  She states that this week has been even worse for her.  She is losing things, having irritability, and has also found that she sometimes purchases things " that she already has because she forgot that she purchased them.  She is ultimately concerned about getting the symptoms under control because she is about to start a new job.  We may need to begin ADHD medication in order to not delay treatment any further for her.  We discussed the need for satisfactory urine drug screen and sign controlled substances agreement.  Denies SI/HI/AVH. PHQ-9, 4 YOLA-7, 4 and the PHQ-9 is congruent with assessment and presentation, however the YOLA-7 presents as somewhat incongruent with her anxiety symptoms.    UDS is satisfactory  POC Urine Drug Screen [facility] (01/30/2023 14:53)  EDU is satisfactory  MICROFILM RECORDS - SCAN - MANUAL [place] EDU REPORT 01/01/2023-01/24/2023 (01/26/2023)  CSA  MICROFILM RECORDS - SCAN - [facility] SIGNED [facility] CONTROLLED SUB CONTRACT (01/30/2023)  Start Adderall XR 5 mg  Follow-up 3 weeks    Record Review is below for 01/30/2023 : I have thoroughly reviewed the patient's electronic medical record to include previous encounters, care everywhere, notes, medications, labs, EDU and UDS (if applicable), imaging, and EKG's.  Pertinent information is included in this note.  1/4/2023 TSH and CBC are both reassuring and within normal limits, ALT is elevated at 46, however remaining CMP is reassuring, no other labs, imaging, procedures, EKGs in record.      1/23/2023 Letty Glez is a 27 y.o. female who presents today for follow up For ADHD, and anxiety. Patient is still having issues with focus and concentration, with hyperactivity. She has a compelling childhood ADHD assessment however was never referred or officially tested. She also has a compelling adult presentation of ADHD symptoms. Denies depressed mood, does have symptoms of anxiety.  Denies SI/HI/AVH. PHQ-9, 4 YOLA-7, 5 and the PHQ-9 is congruent with assessment and presentation, however the YOLA-7 presents as somewhat incongruent with her anxiety symptoms.  We discussed the  possibility of starting Wellbutrin to target ADHD symptoms.  Patient has had extremely negative experience with medications in the past.  She is hesitant to begin a medication while also starting a new job and would like to wait to confirm ADHD diagnosis.    Record Review is below for 01/23/2023 : I have thoroughly reviewed the patient's electronic medical record to include previous encounters, care everywhere, notes, medications, labs, EDU and UDS (if applicable), imaging, and EKG's.  Pertinent information is included in this note.  TSH and CBC are both reassuring and within normal limits, ALT is elevated at 46, however remaining CMP is reassuring, no other labs, imaging, procedures, EKGs in record.  No records for 6-month date range in EDU.    1/9/2023 I am referring her to Dr. Petty, PhD for testing to confirm diagnosis  I expect that she will have a positive diagnosis of ADHD  I would also expect that she will have a satisfactory UDS  Follow-up 1 month  Presentation seems most consistent with generalized anxiety disorder and ADHD DSM-5 criteria.  No medication orders at this time as patient does not wish to start medication and wishes to confirm the diagnosis with testing.  We discussed that treatment of her ADHD could potentially help eliminate her anxiety while increasing concentration and focus.  Will refer to Dr. Petty, PhD for testing to confirm diagnosis of ADHD.  I would expect that she will have a satisfactory UDS and sign a CSA should we consider controlled substances and treatment.  I extensively discussed this with the patient. Patient verbalized understanding and is agreeable to this plan.  Addressed all questions and concerns.  Continue psychotherapeutic modalities as indicated.    Psychiatric/Behavioral Health History  Began Treatment: 2019  Diagnoses: Anxiety  Psychiatrist: Denies, Family dr ordered medicine for anxiety (Celexa) but she did not take it  Therapist: Jason  Admission  History: Denies  Medication Trials: buspar, hydroxyzine, propanolol  Self Harm: Denies  Suicide Attempts: Denies  Trauma: Denies    MENTAL STATUS EXAM   General Appearance:  Cleanly groomed and dressed and well developed  Eye Contact:  Good eye contact  Attitude:  Cooperative and polite  Motor Activity:  Normal gait, posture  Speech:  Normal rate, tone, volume  Mood and affect:  Normal, pleasant and euthymic  Hopelessness:  Denies  Thought Process:  Logical and goal-directed  Associations/ Thought Content:  No delusions  Hallucinations:  None  Suicidal Ideations:  Not present  Homicidal Ideation:  Not present  Sensorium:  Alert  Orientation:  Person, place, time and situation  Immediate Recall, Recent, and Remote Memory:  Intact  Attention Span/ Concentration:  Good  Fund of Knowledge:  Appropriate for age and educational level  Intellectual Functioning:  Average range  Insight:  Good  Judgement:  Good  Reliability:  Good  Impulse Control:  Good          Review of systems is negative except as noted in HPI.  Labs:  WBC   Date Value Ref Range Status   08/29/2023 4.69 3.40 - 10.80 10*3/mm3 Final     Platelets   Date Value Ref Range Status   08/29/2023 234 140 - 450 10*3/mm3 Final     Hemoglobin   Date Value Ref Range Status   08/29/2023 14.3 12.0 - 15.9 g/dL Final     Hematocrit   Date Value Ref Range Status   08/29/2023 41.8 34.0 - 46.6 % Final     Glucose   Date Value Ref Range Status   01/04/2023 86 65 - 99 mg/dL Final     Creatinine   Date Value Ref Range Status   01/04/2023 0.74 0.57 - 1.00 mg/dL Final     ALT (SGPT)   Date Value Ref Range Status   01/04/2023 46 (H) 1 - 33 U/L Final     AST (SGOT)   Date Value Ref Range Status   01/04/2023 31 1 - 32 U/L Final     BUN   Date Value Ref Range Status   01/04/2023 13 6 - 20 mg/dL Final     eGFR   Date Value Ref Range Status   01/04/2023 113.2 >60.0 mL/min/1.73 Final     Comment:     National Kidney Foundation and American Society of Nephrology (ASN) Task Force  recommended calculation based on the Chronic Kidney Disease Epidemiology Collaboration (CKD-EPI) equation refit without adjustment for race.     TSH   Date Value Ref Range Status   01/04/2023 1.070 0.270 - 4.200 uIU/mL Final      Pain Management Panel          Latest Ref Rng & Units 1/30/2023   Pain Management Panel   Amphetamine, Urine Qual Negative Negative    Barbiturates Screen, Urine Negative Negative    Benzodiazepine Screen, Urine Negative Negative    Buprenorphine, Screen, Urine Negative Negative    Cocaine Screen, Urine Negative Negative    Methadone Screen , Urine Negative Negative    Methamphetamine, Ur Negative Negative         Imaging Results:  No Images in the past 120 days found..    Current Medications:   Current Outpatient Medications   Medication Sig Dispense Refill    ALBUTEROL IN       ALPRAZolam (Xanax) 0.25 MG tablet Take 1 tablet by mouth Daily As Needed for Anxiety (Panic Attacks; use sparingly). (Patient not taking: Reported on 8/29/2023) 10 tablet 0    cetirizine (zyrTEC) 10 MG tablet Take 1 tablet by mouth Every Night.      dicyclomine (BENTYL) 10 MG capsule Take 1 capsule by mouth 3 (Three) Times a Day As Needed (loose stools/ IBS). (Patient taking differently: Take 1 capsule by mouth 3 (Three) Times a Day As Needed (loose stools/ IBS). prn) 60 capsule 2    escitalopram (Lexapro) 10 MG tablet Take 1 tablet by mouth Daily. 30 tablet 2    guanFACINE (TENEX) 1 MG tablet Take 1 tablet by mouth Every Night.      lisdexamfetamine (Vyvanse) 30 MG capsule Take 1 capsule by mouth Every Morning 30 capsule 0    multivitamin with minerals tablet tablet Take 1 tablet by mouth Daily.      propranolol (INDERAL) 10 MG tablet Take 1 tablet by mouth 2 (Two) Times a Day As Needed (Anxiety). (Patient not taking: Reported on 8/29/2023) 60 tablet 1    sodium chloride 0.9 % nebulizer solution 0.82 mL with albuterol (5 MG/ML) 0.5% nebulizer solution 0.9 mg Take 15 mg/hr by nebulization Continuous. (Patient not  taking: Reported on 9/13/2023)       No current facility-administered medications for this visit.       Problem List:  Patient Active Problem List   Diagnosis    Concentration deficit    Diarrhea    Thyroid disorder screen    Irritable bowel syndrome with diarrhea       Allergy:   Allergies   Allergen Reactions    Mixed Feathers Other (See Comments)     Told as child to not take (CHICKEN FEATHERS)    Codeine Nausea And Vomiting    Haemophilus Influenzae Vaccines Other (See Comments)     ALLERGIC TO CHICKEN FEATHERS AND CHICKEN BASED INGREDIENTS         Discontinued Medications:  There are no discontinued medications.      PLAN:   Presentation seems most consistent with DSM-V criteria for:  Diagnoses and all orders for this visit:    1. Current mild episode of major depressive disorder without prior episode (Primary)    2. YOLA (generalized anxiety disorder)    3. Panic attacks    4. Attention deficit hyperactivity disorder (ADHD), predominantly inattentive type  -     lisdexamfetamine (Vyvanse) 30 MG capsule; Take 1 capsule by mouth Every Morning  Dispense: 30 capsule; Refill: 0    5. Medication management  -     Urine Drug Screen - Urine, Clean Catch; Future       Continue Lexapro to 10 mg daily.  Discontinue guanfacine   Start Vyvanse 30 mg  Continue propanolol 10 mg bid prn for anxiety/panic attacks  Continue alprazolam 0.25 mg qd prn for anxiety/panic attacks qty. 10  Follow-up 1 month  Discussed medication options and treatment plan of prescribed medication as well as the risks, benefits, and side effects.  Patient verbalized understanding and is agreeable to this plan.   Patient is agreeable to call the office with any worsening of symptoms or onset of side effects.   Patient is agreeable to call 911 or go to the nearest ER should he/she begin having SI/HI.   Addressed all questions and concerns.    Continue psychotherapeutic modalities as indicated.    TREATMENT PLAN/GOALS:  Treatment plan: Continue supportive  psychotherapy efforts and medications as indicated. Continue to challenge patterns of living conducive to pathology, strengthen defenses, promote problems solving, restore adaptive functioning and provide symptom relief. Treatment and medication options discussed during today's visit. Patient acknowledged and verbally consented to continue with current treatment plan and was educated on the importance of compliance with treatment and follow-up appointments.  Functional status:Good  Prognosis: Good  Progress: Continued improvement    Safety: No acute safety concerns.   Psychotherapy:      30 minutes of supportive psychotherapy with goal to strengthen defenses, promote problems solving, restore adaptive functioning and provide symptom relief. The therapeutic alliance was strengthened to encourage the patient to express their thoughts and feelings. Esteem building was enhanced through praise, reassurance, normalizing and encouragement. Coping skills were enhanced to build distress tolerance skills and emotional regulation. Allowed patient to freely discuss issues without interruption or judgement with unconditional positive regard, active listening skills, and empathy. Provided a safe, confidential environment to facilitate the development of a positive therapeutic relationship and encourage open, honest communication. Assisted patient in identifying risk factors which would indicate the need for higher level of care including thoughts to harm self or others and/or self-harming behavior and encouraged patient to contact this office, call 911, or present to the nearest emergency room should any of these events occur. Assisted patient in processing session content; acknowledged and normalized patient’s thoughts, feelings, and concerns by utilizing a person-centered approach in efforts to build appropriate rapport and a positive therapeutic relationship with open and honest communication. Patient given education on  medication side effects, diagnosis/illness and relapse symptoms. Plan to continue supportive psychotherapy in next appointment to provide symptom relief.      Risk Assessment: Risk of self-harm acutely and chronically is moderate.    Risk factors include anxiety disorder, mood disorder, and recent psychosocial stressors.   Protective factors include no family history, denies access to guns/weapons, no present SI, no history of suicide attempts or self-harm in the past, minimal AODA, healthcare seeking, future orientation, willingness to engage in care.    Risk assessment could be further elevated in the event of treatment noncompliance and/or AODA.  Labs/studies: No labs/studies ordered at this time  Medications:   New Medications Ordered This Visit   Medications    lisdexamfetamine (Vyvanse) 30 MG capsule     Sig: Take 1 capsule by mouth Every Morning     Dispense:  30 capsule     Refill:  0      Medication Education:   VYVANSE (LISDEXAMFETAMINE) Risks, benefits, side effects discussed with patient including elevated heart rate, elevated blood pressure, irritability, insomnia, sexual dysfunction, appetite suppressing properties, psychosis.  After discussion of these risks and benefits, the patient voiced understanding and agreed to proceed. Abdon reviewed, UDS ordered, and controlled substance agreement signed & witnessed.  INDERAL (PROPANOLOL) Risks, benefits, alternatives discussed with patient including dizziness, sedation, falls, low blood pressure, low heart rate, possible exacerbation of asthma.  After discussion of these risks and benefits, the patient voiced understanding and agreed to proceed.  XANAX (ALPRAZOLAM) Risks, benefits, and alternatives discussed with patient including sedation/falls risk, dizziness, disinhibition, headache, fatigue, dry mouth, blurred vision, constipation, nausea, diarrhea, heartburn, unusual taste in mouth, urinary retention, increased appetite, restlessness, sexual  dysfunction, sweating, weight gain, cardiac arrhythmias, seizures, and fall risk.  After discussion of these risks and benefits, patient voiced understanding and agreed to proceed.  Edu reviewed, UDS ordered, and controlled substance agreement signed & witnessed.  LEXAPRO (ESCITALOPRAM)  Risks, benefits, alternatives discussed with patient including GI upset, nausea vomiting diarrhea, theoretical decrease of seizure threshold predisposing the patient to a slightly higher seizure risk, headaches, sexual dysfunction, serotonin syndrome, bleeding risk.  After discussion of these risks and benefits, the patient voiced understanding and agreed to proceed.    EDU reviewed as expected.  Follow-up: Return in about 6 weeks (around 1/9/2024) for Next scheduled follow up.         This document has been electronically signed by VALENTIN Momin  December 13, 2023 17:29 EST    Please note that portions of this note were completed with a voice recognition program.  Copied text in this note has been reviewed and is accurate as of 12/13/23

## 2023-11-29 ENCOUNTER — TELEPHONE (OUTPATIENT)
Dept: PSYCHIATRY | Facility: CLINIC | Age: 28
End: 2023-11-29
Payer: COMMERCIAL

## 2023-11-29 NOTE — TELEPHONE ENCOUNTER
BEHAVIORAL HEALTH - SCAN - [place] APPROVAL; [facility]; 11/29/23. (11/29/2023)

## 2023-12-13 NOTE — PATIENT INSTRUCTIONS
1.  Please return to clinic at your next scheduled visit.  Please contact the clinic ([phone]) at least 24 hours prior in the event you need to cancel.  2.  Do no harm to yourself or others.    3.  Avoid alcohol and drugs.    4.  Take all medications as prescribed.  Please contact the clinic with any concerns. If you are in need of medication refills, please call the clinic at [phone].    5. Should you want to get in touch with your provider, VALENTIN Momin, please contact the office ([phone]), and staff will be able to page Ginger directly.  6. In the event you have personal crisis, contact the following crisis numbers: Suicide Prevention Hotline 1-737.849.4920; [facility] Helpline 6-372-247-ASDA; [facility] Emergency Room [phone]; text HELLO to 687643; or 033.     SPECIFIC RECOMMENDATIONS:     1.      Medications discussed at this encounter:     New Medications Ordered This Visit   Medications    lisdexamfetamine (Vyvanse) 30 MG capsule     Sig: Take 1 capsule by mouth Every Morning     Dispense:  30 capsule     Refill:  0                       2.      Psychotherapy recommendations: We will continue therapy at future visits.     3.     Return to clinic: Return in about 6 weeks (around 1/9/2024) for Next scheduled follow up.

## 2024-01-21 DIAGNOSIS — F41.0 PANIC ATTACKS: ICD-10-CM

## 2024-01-21 DIAGNOSIS — F41.1 GAD (GENERALIZED ANXIETY DISORDER): ICD-10-CM

## 2024-01-21 DIAGNOSIS — F32.0 CURRENT MILD EPISODE OF MAJOR DEPRESSIVE DISORDER WITHOUT PRIOR EPISODE: ICD-10-CM

## 2024-01-22 ENCOUNTER — TELEPHONE (OUTPATIENT)
Dept: BEHAVIORAL HEALTH | Facility: CLINIC | Age: 29
End: 2024-01-22
Payer: COMMERCIAL

## 2024-01-22 ENCOUNTER — LAB (OUTPATIENT)
Dept: LAB | Facility: HOSPITAL | Age: 29
End: 2024-01-22
Payer: COMMERCIAL

## 2024-01-22 DIAGNOSIS — Z79.899 MEDICATION MANAGEMENT: ICD-10-CM

## 2024-01-22 LAB
AMPHET+METHAMPHET UR QL: NEGATIVE
BARBITURATES UR QL SCN: NEGATIVE
BENZODIAZ UR QL SCN: NEGATIVE
CANNABINOIDS SERPL QL: NEGATIVE
COCAINE UR QL: NEGATIVE
FENTANYL UR-MCNC: NEGATIVE NG/ML
METHADONE UR QL SCN: NEGATIVE
OPIATES UR QL: NEGATIVE
OXYCODONE UR QL SCN: NEGATIVE

## 2024-01-22 PROCEDURE — 80307 DRUG TEST PRSMV CHEM ANLYZR: CPT

## 2024-01-22 RX ORDER — ESCITALOPRAM OXALATE 10 MG/1
10 TABLET ORAL DAILY
Qty: 30 TABLET | Refills: 2 | Status: SHIPPED | OUTPATIENT
Start: 2024-01-22 | End: 2025-01-21

## 2024-01-22 NOTE — TELEPHONE ENCOUNTER
LVM FOR PATIENT TO CALL THE OFFICE BACK TO SCHEDULE A FOLLOW UP FOR HER MEDICATION REFILL TO BE PROCESSED.

## 2024-01-22 NOTE — TELEPHONE ENCOUNTER
Medication refill request for lexapro 10 mg     escitalopram (Lexapro) 10 MG tablet (10/16/2023)     Patient canceled last appt on 01/16/24  No follow up on file at this time

## 2024-01-24 ENCOUNTER — OFFICE VISIT (OUTPATIENT)
Dept: BEHAVIORAL HEALTH | Facility: CLINIC | Age: 29
End: 2024-01-24
Payer: COMMERCIAL

## 2024-01-24 VITALS
DIASTOLIC BLOOD PRESSURE: 74 MMHG | OXYGEN SATURATION: 99 % | HEART RATE: 56 BPM | WEIGHT: 201.19 LBS | BODY MASS INDEX: 37.02 KG/M2 | SYSTOLIC BLOOD PRESSURE: 108 MMHG | HEIGHT: 62 IN

## 2024-01-24 DIAGNOSIS — F41.1 GAD (GENERALIZED ANXIETY DISORDER): ICD-10-CM

## 2024-01-24 DIAGNOSIS — F41.0 PANIC ATTACKS: ICD-10-CM

## 2024-01-24 DIAGNOSIS — F90.0 ATTENTION DEFICIT HYPERACTIVITY DISORDER (ADHD), PREDOMINANTLY INATTENTIVE TYPE: ICD-10-CM

## 2024-01-24 DIAGNOSIS — F32.0 CURRENT MILD EPISODE OF MAJOR DEPRESSIVE DISORDER WITHOUT PRIOR EPISODE: Primary | ICD-10-CM

## 2024-01-24 PROCEDURE — 99214 OFFICE O/P EST MOD 30 MIN: CPT

## 2024-01-24 PROCEDURE — 90833 PSYTX W PT W E/M 30 MIN: CPT

## 2024-01-24 RX ORDER — DEXTROAMPHETAMINE SACCHARATE, AMPHETAMINE ASPARTATE MONOHYDRATE, DEXTROAMPHETAMINE SULFATE AND AMPHETAMINE SULFATE 2.5; 2.5; 2.5; 2.5 MG/1; MG/1; MG/1; MG/1
10 CAPSULE, EXTENDED RELEASE ORAL DAILY
Qty: 30 CAPSULE | Refills: 0 | Status: SHIPPED | OUTPATIENT
Start: 2024-01-24 | End: 2025-01-23

## 2024-01-24 NOTE — PROGRESS NOTES
"[facility] [place] [facility]  Medication Management Follow-up    Vital Signs:   /74   Pulse 56   Ht 157.5 cm (62\")   Wt 91.3 kg (201 lb 3 oz)   SpO2 99%   BMI 36.80 kg/m²     Chief Complaint: Depression. Anxiety. ADHD.     History of Present Illness:   Letty Glze is a 28 y.o. female who presents today for follow-up and medication management for:    ICD-10-CM ICD-9-CM   1. Current mild episode of major depressive disorder without prior episode  F32.0 296.21   2. YOLA (generalized anxiety disorder)  F41.1 300.02   3. Panic attacks  F41.0 300.01   4. Attention deficit hyperactivity disorder (ADHD), predominantly inattentive type  F90.0 314.00       01/24/2024 Patient stopped taking vyvanse related to uncomfortable side effects, she was having insomnia, intense sweating, and it made her feel really weird.   ADHD: Symptoms are persistent and significantly interfere with major life activities and/or result in significant suffering  Having  trouble again with starting, taking action, focus, attention to detail.   Getting  on Stevens's Day.   Depression and anxiety  Visit Type: follow-up (Depression, YOLA, ADHD, Panic Attacks)  Patient presents with the following symptoms: anhedonia, decreased concentration, depressed mood, excessive worry, feelings of hopelessness, feelings of worthlessness, irritability, nervousness/anxiety, panic and psychomotor agitation.  Patient is not experiencing: fatigue, suicidal ideas, suicidal planning and thoughts of death.  Frequency of symptoms: most days   Severity: causing significant distress   Sleep quality: fair  Denies suicidal ideation.  Denies AVH.  We will continue to monitor for mood, behavior, and safety.  PHQ-9 is 17 and YOLA-7 is 8, notably these are an increase.    Record Review is below for 01/24/2024 :   1/4/2023 TSH and CBC are both reassuring and within normal limits, ALT is elevated at 46, however remaining CMP is reassuring, no other labs, " imaging, procedures, EKGs in record.   EKG Results:   None in record  Head Imaging:  None in record      11/28/2023 Patient is taking medications as prescribed and is tolerating them well.   Struggle with starting things and getting things going. Anxiety is well-controlled with lexapro. Has been procrastinating. She is an  for someone else and doesn't forget things for them, but often tends to forget things for her life. We are discussing alternative medication for ADHD.   Depression  Visit Type: follow-up (Depression, YOLA, ADHD, Panic Attacks)  Patient presents with the following symptoms: anhedonia, decreased concentration, depressed mood, excessive worry, feelings of hopelessness, feelings of worthlessness, irritability, nervousness/anxiety, panic and psychomotor agitation.  Patient is not experiencing: fatigue, suicidal ideas, suicidal planning and thoughts of death.  Frequency of symptoms: most days   Severity: causing significant distress   Sleep quality: fair  Denies suicidal ideation.  Denies AVH.  We will continue to monitor for mood, behavior, and safety.  Continue Lexapro to 10 mg daily.  Discontinue guanfacine   Start Vyvanse 30 mg  Continue propanolol 10 mg bid prn for anxiety/panic attacks   Continue alprazolam 0.25 mg qd prn for anxiety/panic attacks qty. 10  Follow-up 1 month    Record Review is below for 11/28/2023 : I have thoroughly reviewed the patient's electronic medical record to include previous encounters, care everywhere, notes, medications, labs, EDU and UDS (if applicable), imaging, and EKG's.  Pertinent information is included in this note.  1/4/2023 TSH and CBC are both reassuring and within normal limits, ALT is elevated at 46, however remaining CMP is reassuring, no other labs, imaging, procedures, EKGs in record.   EKG Results:   None in record  Head Imaging:  None in record      10/16/2023 Patient is taking medications as prescribed and is tolerating them well.  "  Patient states that she feels like Lexapro is helping, we are discussing a potential increase in the dose as she has been on it for at least 1 month.  Panic attacks have significantly decreased with Lexapro.  Xanax does help with panic attacks but she has not needed to use it as frequently.  She does feel like many of her symptoms are exacerbated with situational stressors.  Depression  Visit Type: follow-up (Depression, YOLA, ADHD, Panic Attacks)  Patient presents with the following symptoms: anhedonia, decreased concentration, depressed mood, excessive worry, feelings of hopelessness, feelings of worthlessness, irritability, nervousness/anxiety, panic and psychomotor agitation.  Patient is not experiencing: fatigue, suicidal ideas, suicidal planning and thoughts of death.  Frequency of symptoms: most days   Severity: causing significant distress   Sleep quality: fair  Denies suicidal ideation.  Denies AVH.  We will continue to monitor for mood, behavior, and safety.  Increase Lexapro to 10 mg daily.  Continue guanfacine 1 mg at bedtime  Continue propanolol 10 mg bid prn for anxiety/panic attacks  Continue alprazolam 0.25 mg qd prn for anxiety/panic attacks qty. 10  Follow-up 1 month    Record Review is below for 10/16/2023 : I have thoroughly reviewed the patient's electronic medical record to include previous encounters, care everywhere, notes, medications, labs, EDU and UDS (if applicable), imaging, and EKG's.  Pertinent information is included in this note.  1/4/2023 TSH and CBC are both reassuring and within normal limits, ALT is elevated at 46, however remaining CMP is reassuring, no other labs, imaging, procedures, EKGs in record.   EKG Results:   None in record  Head Imaging:  None in record      09/13/2023 Patient is taking medications as prescribed and is tolerating them well.   \"I feel good mood-wise\" continues to have significant anxiety.  She is wondering if sometimes the Adderall was making " "anxiety worse.  We have tried nonstimulant medications for ADHD.  We are discussing a more long-term management for generalized anxiety disorder and panic attacks.  She is willing to trial this.  Has been having major panic attacks sometimes daily.  Xanax does help with panic attacks.  She is only taking it occasionally and if absolutely necessary.  She believes that these symptoms are decreasing her ability to focus and concentrate.  Denies suicidal ideation.  Denies AVH.  We will continue to monitor for mood, behavior, and safety.  Start Lexapro 5 mg daily.  Discontinue guanfacine 1mg at bedtime  Continue propanolol 10mg bid prn for anxiety/panic attacks  Continue alprazolam 0.25mg qd prn for anxiety/panic attacks qty. 10  Follow-up 1 month    Record Review is below for 09/13/2023 : I have thoroughly reviewed the patient's electronic medical record to include previous encounters, care everywhere, notes, medications, labs, EDU and UDS (if applicable), imaging, and EKG's.  Pertinent information is included in this note.  1/4/2023 TSH and CBC are both reassuring and within normal limits, ALT is elevated at 46, however remaining CMP is reassuring, no other labs, imaging, procedures, EKGs in record.   EKG Results: None in record  Head Imaging:  None in record    08/23/2023 Patient is taking medications as prescribed and is tolerating them well.   Feels like she has been driving the \"struggle bus\" for a while. She has taken Xanax a couple times for panic attacks and it has helped. Has not been taking Adderall XR for about 2 weeks, when the dose was increased to 15mg she said it was \"too much\" and she also was feeling like the 10mg was maybe causing mood reactivity.   Depression  Visit Type: follow-up (Depression, YOLA, ADHD, Panic Attacks)  Patient presents with the following symptoms: anhedonia, decreased concentration, depressed mood, excessive worry, feelings of hopelessness, feelings of worthlessness, " irritability, nervousness/anxiety, panic and psychomotor agitation.  Patient is not experiencing: fatigue, suicidal ideas, suicidal planning and thoughts of death.  Frequency of symptoms: most days   Severity: causing significant distress   Sleep quality: fair  Denies suicidal ideation.  Denies AVH.  We will continue to monitor for mood, behavior, and safety.  Discontinue Adderall  Start guanfacine 1 mg at bedtime  Continue propanolol 10mg bid prn for anxiety/panic attacks  Continue alprazolam 0.25mg qd prn for anxiety/panic attacks qty. 10  Follow-up 1 month    Record Review is below for 08/23/2023 : I have thoroughly reviewed the patient's electronic medical record to include previous encounters, care everywhere, notes, medications, labs, EDU and UDS (if applicable), imaging, and EKG's.  Pertinent information is included in this note.  1/4/2023 TSH and CBC are both reassuring and within normal limits, ALT is elevated at 46, however remaining CMP is reassuring, no other labs, imaging, procedures, EKGs in record.   EKG Results: None in record  Head Imaging:  None in record    07/19/2023 Patient is taking medications as prescribed and is tolerating them well.   She has been battling increased anxiety. This is coming in waves, some days are better than others. She is forgetting things and misplacing things. She is really frustrated with herself. She does not want to start a SSRI to target anxiety. She is having panic attacks nearly daily, sense of impending doom, her hand feels numb.   Adderall XR was effective when we first started medication, now it wears off quickly.   Denies suicidal ideation.  Denies AVH.  We will continue to monitor for mood, behavior, and safety.  Increase Adderall XR to 15mg daily  Continue propanolol 10mg bid prn for anxiety/panic attacks  Start alprazolam 0.25mg qd prn for anxiety/panic attacks qty. 10  Discontinue hydroxyzine  Follow-up 1 month    Record Review is below for 07/19/2023 :  "I have thoroughly reviewed the patient's electronic medical record to include previous encounters, care everywhere, notes, medications, labs, EDU and UDS (if applicable), imaging, and EKG's.  Pertinent information is included in this note.  1/4/2023 TSH and CBC are both reassuring and within normal limits, ALT is elevated at 46, however remaining CMP is reassuring, no other labs, imaging, procedures, EKGs in record.   EKG Results: None in record  Head Imaging:  None in record    7/13/2023 Telephone encounter patient is experiencing intense anxiety, she is having panic attacks despite taking hydroxyzine. She reports that she has taken buspar before and it made her feel like a \"zombie.\" We are going to discuss SSRIs at next appt for treating anxiety.   Start propanolol 10mg bid prn for anxiety/panic attacks  Continue follow up appointment with me   Appointment with Ginger Thakur APRN (07/19/2023)     06/13/2023 Patient is taking medications as prescribed and is tolerating them well. Work is really good. She is under extreme stress because there are 6 people in her house right now and they also brought their dog with them. He boyfriend has recently proposed and she accepted, however, she is somewhat ambivalent about what she wants to do for their future. She does feel like her anxiety and ADHD symptoms are well controlled with Adderall XR.  Denies suicidal ideation. Denies AVH. We will continue to monitor for mood, behavior, and safety.  Continue Adderall XR 10 mg daily  Follow-up 1 month    Record Review is below for 06/13/2023 : I have thoroughly reviewed the patient's electronic medical record to include previous encounters, care everywhere, notes, medications, labs, EDU and UDS (if applicable), imaging, and EKG's.  Pertinent information is included in this note.  1/4/2023 TSH and CBC are both reassuring and within normal limits, ALT is elevated at 46, however remaining CMP is reassuring, no other labs, " "imaging, procedures, EKGs in record.   EKG Results: None in record    05/02/2023 Patient is taking medications as prescribed and is tolerating them well. \"I am good\"  \"I am doing really good.\" She is enjoying her new schedule and she has more energy.  Her mood has improved. Sleep is good, she is feeling more rested. She is able to wake up and get ready for work. Her new hours are a lot better in this new position. Denies suicidal ideation. Denies AVH. We will continue to monitor for mood, behavior, and safety.  Continue Adderall XR 10 mg daily  Ambulatory referral for psychology.  Referral for evaluation, treatment, and individual psychotherapy to Emory Guajardo, PhD  [phone]  Follow-up 6 weeks    Record Review is below for 05/02/2023 : I have thoroughly reviewed the patient's electronic medical record to include previous encounters, care everywhere, notes, medications, labs, EDU and UDS (if applicable), imaging, and EKG's.  Pertinent information is included in this note.  1/4/2023 TSH and CBC are both reassuring and within normal limits, ALT is elevated at 46, however remaining CMP is reassuring, no other labs, imaging, procedures, EKGs in record.   EKG Results: None in record    03/21/2023Amiguel angel Glez is a 27 y.o. female who presents today for follow up for ADHD and anxiety.   Letty started her new job, things are going really well. She thinks this job is maybe going to be more stress free.  She is able to able to get dressed up and put on her make-up and this is helping her feel much better. Patient states the increased dose of Adderall XR 10mg is definitely improving her ADHD symptoms. She feels like she in not \"waning\" at the end of the day. Focus, concentration, and energy levels are improved. She is starting to train for a half-marathon.  Anxiety has also improved. She states that she no longer feels that she has \"90 tabs\" open in her brain.  Nervous about meeting her boyfriend's mom is coming " "and going to stay for a month in June. We may be scheduling some appointments for therapy during this time as an outlet for anxiety. Patient has been taking Adderall XR daily and is tolerating it well.  Denies depressed mood.  Denies suicidal ideation.  Denies AVH.  We will continue to monitor for mood, behavior, and safety.  Continue Adderall XR 10 mg daily  Follow-up 6 weeks     Record Review is below for 03/21/2023 : I have thoroughly reviewed the patient's electronic medical record to include previous encounters, care everywhere, notes, medications, labs, EDU and UDS (if applicable), imaging, and EKG's.  Pertinent information is included in this note.  1/4/2023 TSH and CBC are both reassuring and within normal limits, ALT is elevated at 46, however remaining CMP is reassuring, no other labs, imaging, procedures, EKGs in record.   EKG Results: None in record    02/20/2023Amiguel angel Glez is a 27 y.o. female who presents today for follow up for ADHD and anxiety.  Patient reports that her focus and concentration have greatly improved with the initiation of Adderall for treatment of her ADHD.  She described taking the medication and felt like it made her mind more \"quiet.\"  Patient says that she is already feeling like she can complete tasks more appropriately and feels more organized.  She does feel like she could use a little bit more help as she is going to be starting a new position on [employer].  Denies SI/HI/AVH.  Denies depressed mood.  PHQ-9 is 2 and YOLA-7 is 0.   Increase Adderall XR to 10 mg daily  Patient will return Adderall XR 5 mg to pharmacy  Follow-up 1 month    Record Review is below for 02/20/2023 : I have thoroughly reviewed the patient's electronic medical record to include previous encounters, care everywhere, notes, medications, labs, EDU and UDS (if applicable), imaging, and EKG's.  Pertinent information is included in this note.  1/4/2023 TSH and CBC are both reassuring and within normal " limits, ALT is elevated at 46, however remaining CMP is reassuring, no other labs, imaging, procedures, EKGs in record.     Letty Glez is a 27 y.o. female who presents today for follow up for ADHD, and anxiety. Patient is still having issues with focus and concentration. She has a compelling childhood ADHD assessment however was never referred or officially tested. She also has a compelling adult presentation of ADHD symptoms.  We have been having difficulty finding an opening for neuropsychological testing. Denies depressed mood, does have symptoms of anxiety.  She states that this week has been even worse for her.  She is losing things, having irritability, and has also found that she sometimes purchases things that she already has because she forgot that she purchased them.  She is ultimately concerned about getting the symptoms under control because she is about to start a new job.  We may need to begin ADHD medication in order to not delay treatment any further for her.  We discussed the need for satisfactory urine drug screen and sign controlled substances agreement.  Denies SI/HI/AVH. PHQ-9, 4 YOLA-7, 4 and the PHQ-9 is congruent with assessment and presentation, however the YOLA-7 presents as somewhat incongruent with her anxiety symptoms.    UDS is satisfactory  POC Urine Drug Screen [facility] (01/30/2023 14:53)  EDU is satisfactory  MICROFILM RECORDS - SCAN - MANUAL [place] EDU REPORT 01/01/2023-01/24/2023 (01/26/2023)  CSA  MICROFILM RECORDS - SCAN - [facility] SIGNED [facility] CONTROLLED SUB CONTRACT (01/30/2023)  Start Adderall XR 5 mg  Follow-up 3 weeks    Record Review is below for 01/30/2023 : I have thoroughly reviewed the patient's electronic medical record to include previous encounters, care everywhere, notes, medications, labs, EDU and UDS (if applicable), imaging, and EKG's.  Pertinent information is included in this note.  1/4/2023 TSH and CBC are both reassuring and  within normal limits, ALT is elevated at 46, however remaining CMP is reassuring, no other labs, imaging, procedures, EKGs in record.      1/23/2023 Letty Glez is a 27 y.o. female who presents today for follow up For ADHD, and anxiety. Patient is still having issues with focus and concentration, with hyperactivity. She has a compelling childhood ADHD assessment however was never referred or officially tested. She also has a compelling adult presentation of ADHD symptoms. Denies depressed mood, does have symptoms of anxiety.  Denies SI/HI/AVH. PHQ-9, 4 YOLA-7, 5 and the PHQ-9 is congruent with assessment and presentation, however the YOLA-7 presents as somewhat incongruent with her anxiety symptoms.  We discussed the possibility of starting Wellbutrin to target ADHD symptoms.  Patient has had extremely negative experience with medications in the past.  She is hesitant to begin a medication while also starting a new job and would like to wait to confirm ADHD diagnosis.    Record Review is below for 01/23/2023 : I have thoroughly reviewed the patient's electronic medical record to include previous encounters, care everywhere, notes, medications, labs, EDU and UDS (if applicable), imaging, and EKG's.  Pertinent information is included in this note.  TSH and CBC are both reassuring and within normal limits, ALT is elevated at 46, however remaining CMP is reassuring, no other labs, imaging, procedures, EKGs in record.  No records for 6-month date range in EDU.    1/9/2023 I am referring her to Dr. Petty, PhD for testing to confirm diagnosis  I expect that she will have a positive diagnosis of ADHD  I would also expect that she will have a satisfactory UDS  Follow-up 1 month  Presentation seems most consistent with generalized anxiety disorder and ADHD DSM-5 criteria.  No medication orders at this time as patient does not wish to start medication and wishes to confirm the diagnosis with testing.  We discussed that  "treatment of her ADHD could potentially help eliminate her anxiety while increasing concentration and focus.  Will refer to Dr. Petty, PhD for testing to confirm diagnosis of ADHD.  I would expect that she will have a satisfactory UDS and sign a CSA should we consider controlled substances and treatment.  I extensively discussed this with the patient. Patient verbalized understanding and is agreeable to this plan.  Addressed all questions and concerns.  Continue psychotherapeutic modalities as indicated.    Psychiatric/Behavioral Health History  Began Treatment: 2019  Diagnoses: Anxiety  Psychiatrist: Denies, Family dr ordered medicine for anxiety (Celexa) but she did not take it  Therapist: Denies  Admission History: Denies  Medication Trials: buspar, hydroxyzine, propanolol  Self Harm: Denies  Suicide Attempts: Denies  Trauma: Denies    MENTAL STATUS EXAM   General Appearance:  Cleanly groomed and dressed and well developed  Eye Contact:  Good eye contact  Attitude:  Cooperative and polite  Motor Activity:  Normal gait, posture  Speech:  Normal rate, tone, volume  Mood and affect:  Normal, pleasant and euthymic  Hopelessness:  Denies  Thought Process:  Logical and goal-directed  Associations/ Thought Content:  No delusions  Hallucinations:  None  Suicidal Ideations:  Not present  Homicidal Ideation:  Not present  Sensorium:  Alert  Orientation:  Person, place, time and situation  Immediate Recall, Recent, and Remote Memory:  Intact  Attention Span/ Concentration:  Good  Fund of Knowledge:  Appropriate for age and educational level  Intellectual Functioning:  Average range  Insight:  Good  Judgement:  Good  Reliability:  Good  Impulse Control:  Good     PHQ-9 Depression Screening  PHQ-9 Total Score: 17    Little interest or pleasure in doing things? 1-->several days (patient states that she isnt \"sad feeling\" but is having increased lack of motivation and wanting to be alone more)   Feeling down, depressed, or " hopeless? 0-->not at all   Trouble falling or staying asleep, or sleeping too much? 3-->nearly every day (falling asleep)   Feeling tired or having little energy? 3-->nearly every day   Poor appetite or overeating? 3-->nearly every day (overeating)   Feeling bad about yourself - or that you are a failure or have let yourself or your family down? 1-->several days (patient sometimes feels bad about recent weight gain)   Trouble concentrating on things, such as reading the newspaper or watching television? 3-->nearly every day   Moving or speaking so slowly that other people could have noticed? Or the opposite - being so fidgety or restless that you have been moving around a lot more than usual? 3-->nearly every day (patient states increased feeling of being fidgety)   Thoughts that you would be better off dead, or of hurting yourself in some way? 0-->not at all   PHQ-9 Total Score 17     YOLA-7  Feeling nervous, anxious or on edge: Not at all  Not being able to stop or control worrying: Not at all  Worrying too much about different things: Several days  Trouble Relaxing: Nearly every day  Being so restless that it is hard to sit still: Nearly every day  Feeling afraid as if something awful might happen: Not at all  Becoming easily annoyed or irritable: Several days  YOLA 7 Total Score: 8  If you checked any problems, how difficult have these problems made it for you to do your work, take care of things at home, or get along with other people: Somewhat difficult  Review of systems is negative except as noted in HPI.  Labs:  WBC   Date Value Ref Range Status   08/29/2023 4.69 3.40 - 10.80 10*3/mm3 Final     Platelets   Date Value Ref Range Status   08/29/2023 234 140 - 450 10*3/mm3 Final     Hemoglobin   Date Value Ref Range Status   08/29/2023 14.3 12.0 - 15.9 g/dL Final     Hematocrit   Date Value Ref Range Status   08/29/2023 41.8 34.0 - 46.6 % Final     Glucose   Date Value Ref Range Status   01/04/2023 86 65 - 99  mg/dL Final     Creatinine   Date Value Ref Range Status   01/04/2023 0.74 0.57 - 1.00 mg/dL Final     ALT (SGPT)   Date Value Ref Range Status   01/04/2023 46 (H) 1 - 33 U/L Final     AST (SGOT)   Date Value Ref Range Status   01/04/2023 31 1 - 32 U/L Final     BUN   Date Value Ref Range Status   01/04/2023 13 6 - 20 mg/dL Final     eGFR   Date Value Ref Range Status   01/04/2023 113.2 >60.0 mL/min/1.73 Final     Comment:     National Kid[employer] Foundation and American Society of Nephrology (ASN) Task Force recommended calculation based on the Chronic Kidney Disease Epidemiology Collaboration (CKD-EPI) equation refit without adjustment for race.     TSH   Date Value Ref Range Status   01/04/2023 1.070 0.270 - 4.200 uIU/mL Final      Pain Management Panel  More data may exist         Latest Ref Rng & Units 1/22/2024 1/30/2023   Pain Management Panel   Amphetamine, Urine Qual Negative - Negative    Barbiturates Screen, Urine Negative Negative  Negative    Benzodiazepine Screen, Urine Negative Negative  Negative    Buprenorphine, Screen, Urine Negative - Negative    Cocaine Screen, Urine Negative Negative  Negative    Fentanyl, Urine Negative Negative  -   Methadone Screen , Urine Negative Negative  Negative    Methamphetamine, Ur Negative - Negative       Imaging Results:  No Images in the past 120 days found..  Current Medications:   Current Outpatient Medications   Medication Sig Dispense Refill    ALBUTEROL IN       cetirizine (zyrTEC) 10 MG tablet Take 1 tablet by mouth Every Night.      escitalopram (LEXAPRO) 10 MG tablet TAKE 1 TABLET BY MOUTH DAILY 30 tablet 2    multivitamin with minerals tablet tablet Take 1 tablet by mouth Daily.      ALPRAZolam (Xanax) 0.25 MG tablet Take 1 tablet by mouth Daily As Needed for Anxiety (Panic Attacks; use sparingly). (Patient not taking: Reported on 8/29/2023) 10 tablet 0    amphetamine-dextroamphetamine XR (Adderall XR) 10 MG 24 hr capsule Take 1 capsule by mouth Daily 30  capsule 0    dicyclomine (BENTYL) 10 MG capsule Take 1 capsule by mouth 3 (Three) Times a Day As Needed (loose stools/ IBS). (Patient not taking: Reported on 1/24/2024) 60 capsule 2    propranolol (INDERAL) 10 MG tablet Take 1 tablet by mouth 2 (Two) Times a Day As Needed (Anxiety). (Patient not taking: Reported on 8/29/2023) 60 tablet 1    sodium chloride 0.9 % nebulizer solution 0.82 mL with albuterol (5 MG/ML) 0.5% nebulizer solution 0.9 mg Take 15 mg/hr by nebulization Continuous. (Patient not taking: Reported on 9/13/2023)       No current facility-administered medications for this visit.     Problem List:  Patient Active Problem List   Diagnosis    Concentration deficit    Diarrhea    Thyroid disorder screen    Irritable bowel syndrome with diarrhea     Allergy:   Allergies   Allergen Reactions    Mixed Feathers Other (See Comments)     Told as child to not take (CHICKEN FEATHERS)    Codeine Nausea And Vomiting    Haemophilus Influenzae Vaccines Other (See Comments)     ALLERGIC TO CHICKEN FEATHERS AND CHICKEN BASED INGREDIENTS       Discontinued Medications:  Medications Discontinued During This Encounter   Medication Reason    guanFACINE (TENEX) 1 MG tablet Not Efficacious    lisdexamfetamine (Vyvanse) 30 MG capsule Not Efficacious         PLAN:   Presentation seems most consistent with DSM-V criteria for:  Diagnoses and all orders for this visit:    1. Current mild episode of major depressive disorder without prior episode (Primary)    2. YOLA (generalized anxiety disorder)    3. Panic attacks    4. Attention deficit hyperactivity disorder (ADHD), predominantly inattentive type  -     amphetamine-dextroamphetamine XR (Adderall XR) 10 MG 24 hr capsule; Take 1 capsule by mouth Daily  Dispense: 30 capsule; Refill: 0       Continue Lexapro to 10 mg daily.  Discontinue Vyvanse   Start Adderall XR 10 mg daily  Continue propanolol 10 mg bid prn for anxiety/panic attacks  Continue alprazolam 0.25 mg qd prn for  anxiety/panic attacks qty. 10  Follow-up 1 month  Medication Education:   ADDERALL (AMPHETAMINE) Risks, benefits, side effects discussed with patient including elevated heart rate, elevated blood pressure, irritability, insomnia, sexual dysfunction, appetite suppressing properties, psychosis.  After discussion of these risks and benefits, the patient voiced understanding and agreed to proceed. Abdon reviewed, UDS ordered, and controlled substance agreement signed & witnessed.  INDERAL (PROPANOLOL) Risks, benefits, alternatives discussed with patient including dizziness, sedation, falls, low blood pressure, low heart rate, possible exacerbation of asthma.  After discussion of these risks and benefits, the patient voiced understanding and agreed to proceed.  XANAX (ALPRAZOLAM) Risks, benefits, and alternatives discussed with patient including sedation/falls risk, dizziness, disinhibition, headache, fatigue, dry mouth, blurred vision, constipation, nausea, diarrhea, heartburn, unusual taste in mouth, urinary retention, increased appetite, restlessness, sexual dysfunction, sweating, weight gain, cardiac arrhythmias, seizures, and fall risk.  After discussion of these risks and benefits, patient voiced understanding and agreed to proceed.  Abdon reviewed, UDS ordered, and controlled substance agreement signed & witnessed.  LEXAPRO (ESCITALOPRAM)  Risks, benefits, alternatives discussed with patient including GI upset, nausea vomiting diarrhea, theoretical decrease of seizure threshold predisposing the patient to a slightly higher seizure risk, headaches, sexual dysfunction, serotonin syndrome, bleeding risk.  After discussion of these risks and benefits, the patient voiced understanding and agreed to proceed.  Medications:   New Medications Ordered This Visit   Medications    amphetamine-dextroamphetamine XR (Adderall XR) 10 MG 24 hr capsule     Sig: Take 1 capsule by mouth Daily     Dispense:  30 capsule     Refill:   0     Thx 4 All U Do!      EDU reviewed.   Discussed medication options and treatment plan of prescribed medication as well as the risks, benefits, and side effects.  Patient verbalized understanding and is agreeable to this plan.   Patient is agreeable to call the office with any worsening of symptoms or onset of side effects.   Patient is agreeable to call 911 or go to the nearest ER should he/she begin having SI/HI.   Continue psychotherapeutic modalities as indicated.  Continue to challenge patterns of living conducive to pathology, strengthen defenses, promote problems solving, restore adaptive functioning and provide symptom relief.   Patient acknowledged and verbally consented to continue with current treatment plan and was educated on the importance of compliance with treatment and follow-up appointments.  Addressed all questions and concerns.     Psychotherapy:      20 minutes of supportive psychotherapy with goal to strengthen defenses, promote problems solving, restore adaptive functioning and provide symptom relief. The therapeutic alliance was strengthened to encourage the patient to express their thoughts and feelings. Esteem building was enhanced through praise, reassurance, normalizing and encouragement. Coping skills were enhanced to build distress tolerance skills and emotional regulation. Allowed patient to freely discuss issues without interruption or judgement with unconditional positive regard, active listening skills, and empathy. Provided a safe, confidential environment to facilitate the development of a positive therapeutic relationship and encourage open, honest communication. Assisted patient in identifying risk factors which would indicate the need for higher level of care including thoughts to harm self or others and/or self-harming behavior and encouraged patient to contact this office, call 911, or present to the nearest emergency room should any of these events occur. Assisted  patient in processing session content; acknowledged and normalized patient’s thoughts, feelings, and concerns by utilizing a person-centered approach in efforts to build appropriate rapport and a positive therapeutic relationship with open and honest communication. Patient given education on medication side effects, diagnosis/illness and relapse symptoms. Plan to continue supportive psychotherapy in next appointment to provide symptom relief.      Functional status:Good  Prognosis: Good  Progress: Continued improvement    Safety/Risk Assessment: Risk of self-harm acutely and chronically is moderate.    Risk factors include anxiety disorder, mood disorder, and recent psychosocial stressors.   Protective factors include no family history, denies access to guns/weapons, no present SI, no history of suicide attempts or self-harm in the past, minimal AODA, healthcare seeking, future orientation, willingness to engage in care.    Risk assessment could be further elevated in the event of treatment noncompliance and/or AODA.    Follow-up: Return in about 1 month (around 2/24/2024) for Next scheduled follow up.         This document has been electronically signed by VALENTIN Momin  January 31, 2024 20:53 EST    Please note that portions of this note were completed with a voice recognition program.  Copied text in this note has been reviewed and is accurate as of 01/31/24

## 2024-02-01 NOTE — PATIENT INSTRUCTIONS
1.  Please return to clinic at your next scheduled visit.  Please contact the clinic ([phone]) at least 24 hours prior in the event you need to cancel.  2.  Do no harm to yourself or others.    3.  Avoid alcohol and drugs.    4.  Take all medications as prescribed.  Please contact the clinic with any concerns. If you are in need of medication refills, please call the clinic at [phone].    5. Should you want to get in touch with your provider, VALENTIN Momin, please contact the office ([phone]), and staff will be able to page Ginger directly.  6. In the event you have personal crisis, contact the following crisis numbers: Suicide Prevention Hotline 1-245.769.7829; [facility] Helpline 6-027-190-HKEF; [facility] Emergency Room [phone]; text HELLO to 826786; or 053.     SPECIFIC RECOMMENDATIONS:     1.      Medications discussed at this encounter:     New Medications Ordered This Visit   Medications    amphetamine-dextroamphetamine XR (Adderall XR) 10 MG 24 hr capsule     Sig: Take 1 capsule by mouth Daily     Dispense:  30 capsule     Refill:  0     Thx 4 All U Do!                       2.      Psychotherapy recommendations: We will continue therapy at future visits.     3.     Return to clinic: Return in about 1 month (around 2/24/2024) for Next scheduled follow up.

## 2024-02-28 ENCOUNTER — OFFICE VISIT (OUTPATIENT)
Dept: BEHAVIORAL HEALTH | Facility: CLINIC | Age: 29
End: 2024-02-28
Payer: COMMERCIAL

## 2024-02-28 VITALS
OXYGEN SATURATION: 99 % | HEIGHT: 62 IN | SYSTOLIC BLOOD PRESSURE: 114 MMHG | BODY MASS INDEX: 37.26 KG/M2 | WEIGHT: 202.5 LBS | HEART RATE: 60 BPM | DIASTOLIC BLOOD PRESSURE: 75 MMHG

## 2024-02-28 DIAGNOSIS — F90.0 ATTENTION DEFICIT HYPERACTIVITY DISORDER (ADHD), PREDOMINANTLY INATTENTIVE TYPE: ICD-10-CM

## 2024-02-28 DIAGNOSIS — F41.0 PANIC ATTACKS: ICD-10-CM

## 2024-02-28 DIAGNOSIS — F32.0 CURRENT MILD EPISODE OF MAJOR DEPRESSIVE DISORDER WITHOUT PRIOR EPISODE: Primary | ICD-10-CM

## 2024-02-28 DIAGNOSIS — F41.1 GAD (GENERALIZED ANXIETY DISORDER): ICD-10-CM

## 2024-02-28 RX ORDER — DEXTROAMPHETAMINE SACCHARATE, AMPHETAMINE ASPARTATE, DEXTROAMPHETAMINE SULFATE AND AMPHETAMINE SULFATE 2.5; 2.5; 2.5; 2.5 MG/1; MG/1; MG/1; MG/1
10 TABLET ORAL
Qty: 30 TABLET | Refills: 0 | Status: SHIPPED | OUTPATIENT
Start: 2024-02-28 | End: 2024-02-29 | Stop reason: SDUPTHER

## 2024-02-28 RX ORDER — DEXTROAMPHETAMINE SACCHARATE, AMPHETAMINE ASPARTATE MONOHYDRATE, DEXTROAMPHETAMINE SULFATE AND AMPHETAMINE SULFATE 2.5; 2.5; 2.5; 2.5 MG/1; MG/1; MG/1; MG/1
10 CAPSULE, EXTENDED RELEASE ORAL DAILY
Qty: 30 CAPSULE | Refills: 0 | Status: SHIPPED | OUTPATIENT
Start: 2024-02-28 | End: 2024-02-29 | Stop reason: SDUPTHER

## 2024-02-28 RX ORDER — GUANFACINE 1 MG/1
1 TABLET ORAL NIGHTLY
Qty: 90 TABLET | Refills: 1 | OUTPATIENT
Start: 2024-02-28

## 2024-02-28 NOTE — PROGRESS NOTES
"[facility] Health/Psychiatry  Medication Management Follow-up    Vital Signs:   /75   Pulse 60   Ht 157.5 cm (62\")   Wt 91.9 kg (202 lb 8 oz)   SpO2 99%   BMI 37.04 kg/m²     Chief Complaint: Anxiety. Depression.     History of Present Illness:   Letty Glez is a 28 y.o. female who presents today for follow-up and medication management for:    ICD-10-CM ICD-9-CM   1. Current mild episode of major depressive disorder without prior episode  F32.0 296.21   2. YOLA (generalized anxiety disorder)  F41.1 300.02   3. Panic attacks  F41.0 300.01   4. Attention deficit hyperactivity disorder (ADHD), predominantly inattentive type  F90.0 314.00       02/28/2024 Patient is taking medications as prescribed and is tolerating them well.   \"I feel like everything is more together\" Medication vacations on the weekend is not working well, she feels she needs to take medication consistently for highest benefit. Denies panic attacks.   Has noticed improvement in ADHD symptoms taking action, focus, attention to detail. Endorses an afternoon \"crash\" symptoms returning, increased anxiety and difficulty maintaining symptoms.   Depression and anxiety  Visit Type: follow-up (Depression, YOLA, ADHD, Panic Attacks)  Patient presents with gradual improvement of the following symptoms: anhedonia, decreased concentration, depressed mood, excessive worry, feelings of hopelessness, feelings of worthlessness, irritability, nervousness/anxiety, panic and psychomotor agitation.  Patient is not experiencing: fatigue, suicidal ideas, suicidal planning and thoughts of death.  Frequency of symptoms: most days   Severity: causing significant distress   Sleep quality: fair  Denies suicidal ideation.  Denies AVH.  We will continue to monitor for mood, behavior, and safety.    Record Review is below for 02/28/2024 :   1/4/2023 TSH and CBC are both reassuring and within normal limits, ALT is elevated at 46, however remaining CMP is " reassuring, no other labs, imaging, procedures, EKGs in record.   EKG Results:   None in record  Head Imaging:  None in record    01/24/2024 Patient stopped taking vyvanse related to uncomfortable side effects, she was having insomnia, intense sweating, and it made her feel really weird.   ADHD: Symptoms are persistent and significantly interfere with major life activities and/or result in significant suffering  Having  trouble again with starting, taking action, focus, attention to detail.   Getting  on Stevens's Day.   Depression and anxiety  Visit Type: follow-up (Depression, YOLA, ADHD, Panic Attacks)  Patient presents with the following symptoms: anhedonia, decreased concentration, depressed mood, excessive worry, feelings of hopelessness, feelings of worthlessness, irritability, nervousness/anxiety, panic and psychomotor agitation.  Patient is not experiencing: fatigue, suicidal ideas, suicidal planning and thoughts of death.  Frequency of symptoms: most days   Severity: causing significant distress   Sleep quality: fair  Denies suicidal ideation.  Denies AVH.  We will continue to monitor for mood, behavior, and safety.  PHQ-9 is 17 and YOLA-7 is 8, notably these are an increase.  Continue Lexapro to 10 mg daily.  Discontinue Vyvanse   Start Adderall XR 10 mg daily  Continue propanolol 10 mg bid prn for anxiety/panic attacks  Continue alprazolam 0.25 mg qd prn for anxiety/panic attacks qty. 10  Follow-up 1 month    Record Review is below for 01/24/2024 :   1/4/2023 TSH and CBC are both reassuring and within normal limits, ALT is elevated at 46, however remaining CMP is reassuring, no other labs, imaging, procedures, EKGs in record.   EKG Results:   None in record  Head Imaging:  None in record      11/28/2023 Patient is taking medications as prescribed and is tolerating them well.   Struggle with starting things and getting things going. Anxiety is well-controlled with lexapro. Has been procrastinating.  She is an  for someone else and doesn't forget things for them, but often tends to forget things for her life. We are discussing alternative medication for ADHD.   Depression  Visit Type: follow-up (Depression, YOLA, ADHD, Panic Attacks)  Patient presents with the following symptoms: anhedonia, decreased concentration, depressed mood, excessive worry, feelings of hopelessness, feelings of worthlessness, irritability, nervousness/anxiety, panic and psychomotor agitation.  Patient is not experiencing: fatigue, suicidal ideas, suicidal planning and thoughts of death.  Frequency of symptoms: most days   Severity: causing significant distress   Sleep quality: fair  Denies suicidal ideation.  Denies AVH.  We will continue to monitor for mood, behavior, and safety.  Continue Lexapro to 10 mg daily.  Discontinue guanfacine   Start Vyvanse 30 mg  Continue propanolol 10 mg bid prn for anxiety/panic attacks   Continue alprazolam 0.25 mg qd prn for anxiety/panic attacks qty. 10  Follow-up 1 month    Record Review is below for 11/28/2023 : I have thoroughly reviewed the patient's electronic medical record to include previous encounters, care everywhere, notes, medications, labs, EDU and UDS (if applicable), imaging, and EKG's.  Pertinent information is included in this note.  1/4/2023 TSH and CBC are both reassuring and within normal limits, ALT is elevated at 46, however remaining CMP is reassuring, no other labs, imaging, procedures, EKGs in record.   EKG Results:   None in record  Head Imaging:  None in record      10/16/2023 Patient is taking medications as prescribed and is tolerating them well.   Patient states that she feels like Lexapro is helping, we are discussing a potential increase in the dose as she has been on it for at least 1 month.  Panic attacks have significantly decreased with Lexapro.  Xanax does help with panic attacks but she has not needed to use it as frequently.  She does feel like  "many of her symptoms are exacerbated with situational stressors.  Depression  Visit Type: follow-up (Depression, YOLA, ADHD, Panic Attacks)  Patient presents with the following symptoms: anhedonia, decreased concentration, depressed mood, excessive worry, feelings of hopelessness, feelings of worthlessness, irritability, nervousness/anxiety, panic and psychomotor agitation.  Patient is not experiencing: fatigue, suicidal ideas, suicidal planning and thoughts of death.  Frequency of symptoms: most days   Severity: causing significant distress   Sleep quality: fair  Denies suicidal ideation.  Denies AVH.  We will continue to monitor for mood, behavior, and safety.  Increase Lexapro to 10 mg daily.  Continue guanfacine 1 mg at bedtime  Continue propanolol 10 mg bid prn for anxiety/panic attacks  Continue alprazolam 0.25 mg qd prn for anxiety/panic attacks qty. 10  Follow-up 1 month    Record Review is below for 10/16/2023 : I have thoroughly reviewed the patient's electronic medical record to include previous encounters, care everywhere, notes, medications, labs, EDU and UDS (if applicable), imaging, and EKG's.  Pertinent information is included in this note.  1/4/2023 TSH and CBC are both reassuring and within normal limits, ALT is elevated at 46, however remaining CMP is reassuring, no other labs, imaging, procedures, EKGs in record.   EKG Results:   None in record  Head Imaging:  None in record      09/13/2023 Patient is taking medications as prescribed and is tolerating them well.   \"I feel good mood-wise\" continues to have significant anxiety.  She is wondering if sometimes the Adderall was making anxiety worse.  We have tried nonstimulant medications for ADHD.  We are discussing a more long-term management for generalized anxiety disorder and panic attacks.  She is willing to trial this.  Has been having major panic attacks sometimes daily.  Xanax does help with panic attacks.  She is only taking it " "occasionally and if absolutely necessary.  She believes that these symptoms are decreasing her ability to focus and concentrate.  Denies suicidal ideation.  Denies AVH.  We will continue to monitor for mood, behavior, and safety.  Start Lexapro 5 mg daily.  Discontinue guanfacine 1mg at bedtime  Continue propanolol 10mg bid prn for anxiety/panic attacks  Continue alprazolam 0.25mg qd prn for anxiety/panic attacks qty. 10  Follow-up 1 month    Record Review is below for 09/13/2023 : I have thoroughly reviewed the patient's electronic medical record to include previous encounters, care everywhere, notes, medications, labs, EDU and UDS (if applicable), imaging, and EKG's.  Pertinent information is included in this note.  1/4/2023 TSH and CBC are both reassuring and within normal limits, ALT is elevated at 46, however remaining CMP is reassuring, no other labs, imaging, procedures, EKGs in record.   EKG Results: None in record  Head Imaging:  None in record    08/23/2023 Patient is taking medications as prescribed and is tolerating them well.   Feels like she has been driving the \"[place] bus\" for a while. She has taken Xanax a couple times for panic attacks and it has helped. Has not been taking Adderall XR for about 2 weeks, when the dose was increased to 15mg she said it was \"too much\" and she also was feeling like the 10mg was maybe causing mood reactivity.   Depression  Visit Type: follow-up (Depression, YOLA, ADHD, Panic Attacks)  Patient presents with the following symptoms: anhedonia, decreased concentration, depressed mood, excessive worry, feelings of hopelessness, feelings of worthlessness, irritability, nervousness/anxiety, panic and psychomotor agitation.  Patient is not experiencing: fatigue, suicidal ideas, suicidal planning and thoughts of death.  Frequency of symptoms: most days   Severity: causing significant distress   Sleep quality: fair  Denies suicidal ideation.  Denies AVH.  We will continue to " monitor for mood, behavior, and safety.  Discontinue Adderall  Start guanfacine 1 mg at bedtime  Continue propanolol 10mg bid prn for anxiety/panic attacks  Continue alprazolam 0.25mg qd prn for anxiety/panic attacks qty. 10  Follow-up 1 month    Record Review is below for 08/23/2023 : I have thoroughly reviewed the patient's electronic medical record to include previous encounters, care everywhere, notes, medications, labs, EDU and UDS (if applicable), imaging, and EKG's.  Pertinent information is included in this note.  1/4/2023 TSH and CBC are both reassuring and within normal limits, ALT is elevated at 46, however remaining CMP is reassuring, no other labs, imaging, procedures, EKGs in record.   EKG Results: None in record  Head Imaging:  None in record    07/19/2023 Patient is taking medications as prescribed and is tolerating them well.   She has been battling increased anxiety. This is coming in waves, some days are better than others. She is forgetting things and misplacing things. She is really frustrated with herself. She does not want to start a SSRI to target anxiety. She is having panic attacks nearly daily, sense of impending doom, her hand feels numb.   Adderall XR was effective when we first started medication, now it wears off quickly.   Denies suicidal ideation.  Denies AVH.  We will continue to monitor for mood, behavior, and safety.  Increase Adderall XR to 15mg daily  Continue propanolol 10mg bid prn for anxiety/panic attacks  Start alprazolam 0.25mg qd prn for anxiety/panic attacks qty. 10  Discontinue hydroxyzine  Follow-up 1 month    Record Review is below for 07/19/2023 : I have thoroughly reviewed the patient's electronic medical record to include previous encounters, care everywhere, notes, medications, labs, EDU and UDS (if applicable), imaging, and EKG's.  Pertinent information is included in this note.  1/4/2023 TSH and CBC are both reassuring and within normal limits, ALT is  "elevated at 46, however remaining CMP is reassuring, no other labs, imaging, procedures, EKGs in record.   EKG Results: None in record  Head Imaging:  None in record    7/13/2023 Telephone encounter patient is experiencing intense anxiety, she is having panic attacks despite taking hydroxyzine. She reports that she has taken buspar before and it made her feel like a \"zombie.\" We are going to discuss SSRIs at next appt for treating anxiety.   Start propanolol 10mg bid prn for anxiety/panic attacks  Continue follow up appointment with me   Appointment with Ginger Thakur APRN (07/19/2023)     06/13/2023 Patient is taking medications as prescribed and is tolerating them well. Work is really good. She is under extreme stress because there are 6 people in her house right now and they also brought their dog with them. He boyfriend has recently proposed and she accepted, however, she is somewhat ambivalent about what she wants to do for their future. She does feel like her anxiety and ADHD symptoms are well controlled with Adderall XR.  Denies suicidal ideation. Denies AVH. We will continue to monitor for mood, behavior, and safety.  Continue Adderall XR 10 mg daily  Follow-up 1 month    Record Review is below for 06/13/2023 : I have thoroughly reviewed the patient's electronic medical record to include previous encounters, care everywhere, notes, medications, labs, EDU and UDS (if applicable), imaging, and EKG's.  Pertinent information is included in this note.  1/4/2023 TSH and CBC are both reassuring and within normal limits, ALT is elevated at 46, however remaining CMP is reassuring, no other labs, imaging, procedures, EKGs in record.   EKG Results: None in record    05/02/2023 Patient is taking medications as prescribed and is tolerating them well. \"I am good\"  \"I am doing really good.\" She is enjoying her new schedule and she has more energy.  Her mood has improved. Sleep is good, she is feeling more rested. She " "is able to wake up and get ready for work. Her new hours are a lot better in this new position. Denies suicidal ideation. Denies AVH. We will continue to monitor for mood, behavior, and safety.  Continue Adderall XR 10 mg daily  Ambulatory referral for psychology.  Referral for evaluation, treatment, and individual psychotherapy to Emory Guajardo, PhD  [phone]  Follow-up 6 weeks    Record Review is below for 05/02/2023 : I have thoroughly reviewed the patient's electronic medical record to include previous encounters, care everywhere, notes, medications, labs, EDU and UDS (if applicable), imaging, and EKG's.  Pertinent information is included in this note.  1/4/2023 TSH and CBC are both reassuring and within normal limits, ALT is elevated at 46, however remaining CMP is reassuring, no other labs, imaging, procedures, EKGs in record.   EKG Results: None in record    03/21/2023Amiguel angel Glez is a 27 y.o. female who presents today for follow up for ADHD and anxiety.   Letty started her new job, things are going really well. She thinks this job is maybe going to be more stress free.  She is able to able to get dressed up and put on her make-up and this is helping her feel much better. Patient states the increased dose of Adderall XR 10mg is definitely improving her ADHD symptoms. She feels like she in not \"waning\" at the end of the day. Focus, concentration, and energy levels are improved. She is starting to train for a half-marathon.  Anxiety has also improved. She states that she no longer feels that she has \"90 tabs\" open in her brain.  Nervous about meeting her boyfriend's mom is coming and going to stay for a month in June. We may be scheduling some appointments for therapy during this time as an outlet for anxiety. Patient has been taking Adderall XR daily and is tolerating it well.  Denies depressed mood.  Denies suicidal ideation.  Denies AVH.  We will continue to monitor for mood, behavior, and " "safety.  Continue Adderall XR 10 mg daily  Follow-up 6 weeks     Record Review is below for 03/21/2023 : I have thoroughly reviewed the patient's electronic medical record to include previous encounters, care everywhere, notes, medications, labs, EDU and UDS (if applicable), imaging, and EKG's.  Pertinent information is included in this note.  1/4/2023 TSH and CBC are both reassuring and within normal limits, ALT is elevated at 46, however remaining CMP is reassuring, no other labs, imaging, procedures, EKGs in record.   EKG Results: None in record    02/20/2023Amiguel angel Glez is a 27 y.o. female who presents today for follow up for ADHD and anxiety.  Patient reports that her focus and concentration have greatly improved with the initiation of Adderall for treatment of her ADHD.  She described taking the medication and felt like it made her mind more \"quiet.\"  Patient says that she is already feeling like she can complete tasks more appropriately and feels more organized.  She does feel like she could use a little bit more help as she is going to be starting a new position on [employer].  Denies SI/HI/AVH.  Denies depressed mood.  PHQ-9 is 2 and YOLA-7 is 0.   Increase Adderall XR to 10 mg daily  Patient will return Adderall XR 5 mg to pharmacy  Follow-up 1 month    Record Review is below for 02/20/2023 : I have thoroughly reviewed the patient's electronic medical record to include previous encounters, care everywhere, notes, medications, labs, EDU and UDS (if applicable), imaging, and EKG's.  Pertinent information is included in this note.  1/4/2023 TSH and CBC are both reassuring and within normal limits, ALT is elevated at 46, however remaining CMP is reassuring, no other labs, imaging, procedures, EKGs in record.     Letty Glez is a 27 y.o. female who presents today for follow up for ADHD, and anxiety. Patient is still having issues with focus and concentration. She has a compelling childhood ADHD " assessment however was never referred or officially tested. She also has a compelling adult presentation of ADHD symptoms.  We have been having difficulty finding an opening for neuropsychological testing. Denies depressed mood, does have symptoms of anxiety.  She states that this week has been even worse for her.  She is losing things, having irritability, and has also found that she sometimes purchases things that she already has because she forgot that she purchased them.  She is ultimately concerned about getting the symptoms under control because she is about to start a new job.  We may need to begin ADHD medication in order to not delay treatment any further for her.  We discussed the need for satisfactory urine drug screen and sign controlled substances agreement.  Denies SI/HI/AVH. PHQ-9, 4 YOLA-7, 4 and the PHQ-9 is congruent with assessment and presentation, however the YOLA-7 presents as somewhat incongruent with her anxiety symptoms.    UDS is satisfactory  POC Urine Drug Screen [facility] (01/30/2023 14:53)  EDU is satisfactory  MICROFILM RECORDS - SCAN - MANUAL [place] EDU REPORT 01/01/2023-01/24/2023 (01/26/2023)  CSA  MICROFILM RECORDS - SCAN - [facility] SIGNED [facility] CONTROLLED SUB CONTRACT (01/30/2023)  Start Adderall XR 5 mg  Follow-up 3 weeks    Record Review is below for 01/30/2023 : I have thoroughly reviewed the patient's electronic medical record to include previous encounters, care everywhere, notes, medications, labs, EDU and UDS (if applicable), imaging, and EKG's.  Pertinent information is included in this note.  1/4/2023 TSH and CBC are both reassuring and within normal limits, ALT is elevated at 46, however remaining CMP is reassuring, no other labs, imaging, procedures, EKGs in record.      1/23/2023 Letty Glez is a 27 y.o. female who presents today for follow up For ADHD, and anxiety. Patient is still having issues with focus and concentration, with  hyperactivity. She has a compelling childhood ADHD assessment however was never referred or officially tested. She also has a compelling adult presentation of ADHD symptoms. Denies depressed mood, does have symptoms of anxiety.  Denies SI/HI/AVH. PHQ-9, 4 YOLA-7, 5 and the PHQ-9 is congruent with assessment and presentation, however the YOLA-7 presents as somewhat incongruent with her anxiety symptoms.  We discussed the possibility of starting Wellbutrin to target ADHD symptoms.  Patient has had extremely negative experience with medications in the past.  She is hesitant to begin a medication while also starting a new job and would like to wait to confirm ADHD diagnosis.    Record Review is below for 01/23/2023 : I have thoroughly reviewed the patient's electronic medical record to include previous encounters, care everywhere, notes, medications, labs, EDU and UDS (if applicable), imaging, and EKG's.  Pertinent information is included in this note.  TSH and CBC are both reassuring and within normal limits, ALT is elevated at 46, however remaining CMP is reassuring, no other labs, imaging, procedures, EKGs in record.  No records for 6-month date range in [facility].    1/9/2023 I am referring her to Dr. Petty, PhD for testing to confirm diagnosis  I expect that she will have a positive diagnosis of ADHD  I would also expect that she will have a satisfactory UDS  Follow-up 1 month  Presentation seems most consistent with generalized anxiety disorder and ADHD DSM-5 criteria.  No medication orders at this time as patient does not wish to start medication and wishes to confirm the diagnosis with testing.  We discussed that treatment of her ADHD could potentially help eliminate her anxiety while increasing concentration and focus.  Will refer to Dr. Petty, PhD for testing to confirm diagnosis of ADHD.  I would expect that she will have a satisfactory UDS and sign a CSA should we consider controlled substances and  treatment.  I extensively discussed this with the patient. Patient verbalized understanding and is agreeable to this plan.  Addressed all questions and concerns.  Continue psychotherapeutic modalities as indicated.    Psychiatric/Behavioral Health History  Began Treatment: 2019  Diagnoses: Anxiety  Psychiatrist: Denies, Family dr ordered medicine for anxiety (Celexa) but she did not take it  Therapist: Denies  Admission History: Denies  Medication Trials: buspar, hydroxyzine, propanolol  Self Harm: Denies  Suicide Attempts: Denies  Trauma: Denies    MENTAL STATUS EXAM   General Appearance:  Cleanly groomed and dressed and well developed  Eye Contact:  Good eye contact  Attitude:  Cooperative and polite  Motor Activity:  Normal gait, posture  Speech:  Normal rate, tone, volume  Mood and affect:  Normal, pleasant and euthymic  Hopelessness:  Denies  Thought Process:  Logical and goal-directed  Associations/ Thought Content:  No delusions  Hallucinations:  None  Suicidal Ideations:  Not present  Homicidal Ideation:  Not present  Sensorium:  Alert  Orientation:  Person, place, time and situation  Immediate Recall, Recent, and Remote Memory:  Intact  Attention Span/ Concentration:  Good  Fund of Knowledge:  Appropriate for age and educational level  Intellectual Functioning:  Average range  Insight:  Good  Judgement:  Good  Reliability:  Good  Impulse Control:  Good          Review of systems is negative except as noted in HPI.  Labs:  WBC   Date Value Ref Range Status   08/29/2023 4.69 3.40 - 10.80 10*3/mm3 Final     Platelets   Date Value Ref Range Status   08/29/2023 234 140 - 450 10*3/mm3 Final     Hemoglobin   Date Value Ref Range Status   08/29/2023 14.3 12.0 - 15.9 g/dL Final     Hematocrit   Date Value Ref Range Status   08/29/2023 41.8 34.0 - 46.6 % Final     Glucose   Date Value Ref Range Status   01/04/2023 86 65 - 99 mg/dL Final     Creatinine   Date Value Ref Range Status   01/04/2023 0.74 0.57 - 1.00 mg/dL  Final     ALT (SGPT)   Date Value Ref Range Status   01/04/2023 46 (H) 1 - 33 U/L Final     AST (SGOT)   Date Value Ref Range Status   01/04/2023 31 1 - 32 U/L Final     BUN   Date Value Ref Range Status   01/04/2023 13 6 - 20 mg/dL Final     eGFR   Date Value Ref Range Status   01/04/2023 113.2 >60.0 mL/min/1.73 Final     Comment:     National Kidney Foundation and American Society of Nephrology (ASN) Task Force recommended calculation based on the Chronic Kidney Disease Epidemiology Collaboration (CKD-EPI) equation refit without adjustment for race.     TSH   Date Value Ref Range Status   01/04/2023 1.070 0.270 - 4.200 uIU/mL Final      Pain Management Panel  More data may exist         Latest Ref Rng & Units 1/22/2024 1/30/2023   Pain Management Panel   Amphetamine, Urine Qual Negative - Negative    Barbiturates Screen, Urine Negative Negative  Negative    Benzodiazepine Screen, Urine Negative Negative  Negative    Buprenorphine, Screen, Urine Negative - Negative    Cocaine Screen, Urine Negative Negative  Negative    Fentanyl, Urine Negative Negative  -   Methadone Screen , Urine Negative Negative  Negative    Methamphetamine, Ur Negative - Negative       Imaging Results:  No Images in the past 120 days found..  Current Medications:   Current Outpatient Medications   Medication Sig Dispense Refill    ALBUTEROL IN       cetirizine (zyrTEC) 10 MG tablet Take 1 tablet by mouth Every Night.      escitalopram (LEXAPRO) 10 MG tablet TAKE 1 TABLET BY MOUTH DAILY 30 tablet 2    multivitamin with minerals tablet tablet Take 1 tablet by mouth Daily.      amphetamine-dextroamphetamine (Adderall) 10 MG tablet Take 1 tablet by mouth Daily With Lunch. 30 tablet 0    amphetamine-dextroamphetamine XR (Adderall XR) 10 MG 24 hr capsule Take 1 capsule by mouth Daily 30 capsule 0     No current facility-administered medications for this visit.     Problem List:  Patient Active Problem List   Diagnosis    Concentration deficit     Diarrhea    Thyroid disorder screen    Irritable bowel syndrome with diarrhea     Allergy:   Allergies   Allergen Reactions    Mixed Feathers Other (See Comments)     Told as child to not take (CHICKEN FEATHERS)    Codeine Nausea And Vomiting    Haemophilus Influenzae Vaccines Other (See Comments)     ALLERGIC TO CHICKEN FEATHERS AND CHICKEN BASED INGREDIENTS       Discontinued Medications:  Medications Discontinued During This Encounter   Medication Reason    ALPRAZolam (Xanax) 0.25 MG tablet Patient Reported Not Taking    sodium chloride 0.9 % nebulizer solution 0.82 mL with albuterol (5 MG/ML) 0.5% nebulizer solution 0.9 mg *Therapy completed    propranolol (INDERAL) 10 MG tablet Patient Reported Not Taking    dicyclomine (BENTYL) 10 MG capsule *Therapy completed    amphetamine-dextroamphetamine XR (Adderall XR) 10 MG 24 hr capsule Reorder         PLAN:   Presentation seems most consistent with DSM-V criteria for:  Diagnoses and all orders for this visit:    1. Current mild episode of major depressive disorder without prior episode (Primary)    2. YOLA (generalized anxiety disorder)    3. Panic attacks    4. Attention deficit hyperactivity disorder (ADHD), predominantly inattentive type  -     Discontinue: amphetamine-dextroamphetamine (Adderall) 10 MG tablet; Take 1 tablet by mouth Daily With Lunch.  Dispense: 30 tablet; Refill: 0  -     Discontinue: amphetamine-dextroamphetamine XR (Adderall XR) 10 MG 24 hr capsule; Take 1 capsule by mouth Daily  Dispense: 30 capsule; Refill: 0       Continue Lexapro to 10 mg daily.  Continue Adderall XR 10 mg daily  Start Adderall IR 10 mg daily at noon, booster dose  Continue propanolol 10 mg bid prn for anxiety/panic attacks  Continue alprazolam 0.25 mg qd prn for anxiety/panic attacks qty. 10  Follow-up 1 month  Medication Education:   ADDERALL (AMPHETAMINE) Risks, benefits, side effects discussed with patient including elevated heart rate, elevated blood pressure,  irritability, insomnia, sexual dysfunction, appetite suppressing properties, psychosis.  After discussion of these risks and benefits, the patient voiced understanding and agreed to proceed. Edu reviewed, UDS ordered, and controlled substance agreement signed & witnessed.  INDERAL (PROPANOLOL) Risks, benefits, alternatives discussed with patient including dizziness, sedation, falls, low blood pressure, low heart rate, possible exacerbation of asthma.  After discussion of these risks and benefits, the patient voiced understanding and agreed to proceed.  XANAX (ALPRAZOLAM) Risks, benefits, and alternatives discussed with patient including sedation/falls risk, dizziness, disinhibition, headache, fatigue, dry mouth, blurred vision, constipation, nausea, diarrhea, heartburn, unusual taste in mouth, urinary retention, increased appetite, restlessness, sexual dysfunction, sweating, weight gain, cardiac arrhythmias, seizures, and fall risk.  After discussion of these risks and benefits, patient voiced understanding and agreed to proceed.  Edu reviewed, UDS ordered, and controlled substance agreement signed & witnessed.  LEXAPRO (ESCITALOPRAM)  Risks, benefits, alternatives discussed with patient including GI upset, nausea vomiting diarrhea, theoretical decrease of seizure threshold predisposing the patient to a slightly higher seizure risk, headaches, sexual dysfunction, serotonin syndrome, bleeding risk.  After discussion of these risks and benefits, the patient voiced understanding and agreed to proceed.  Medications: No orders of the defined types were placed in this encounter.     EDU reviewed.   Discussed medication options and treatment plan of prescribed medication as well as the risks, benefits, and side effects.  Patient verbalized understanding and is agreeable to this plan.   Patient is agreeable to call the office with any worsening of symptoms or onset of side effects.   Patient is agreeable to call  911 or go to the nearest ER should he/she begin having SI/HI.   Continue psychotherapeutic modalities as indicated.  Continue to challenge patterns of living conducive to pathology, strengthen defenses, promote problems solving, restore adaptive functioning and provide symptom relief.   Patient acknowledged and verbally consented to continue with current treatment plan and was educated on the importance of compliance with treatment and follow-up appointments.  Addressed all questions and concerns.     Psychotherapy:    Provided minimal supportive therapy  Functional status:Good  Prognosis: Good  Progress: Continued improvement    Safety/Risk Assessment: Risk of self-harm acutely and chronically is low.    Risk factors include anxiety disorder, mood disorder, and recent psychosocial stressors.   Protective factors include no family history, denies access to guns/weapons, no present SI, no history of suicide attempts or self-harm in the past, minimal AODA, healthcare seeking, future orientation, willingness to engage in care.    Risk assessment could be further elevated in the event of treatment noncompliance and/or AODA.    Follow-up: Return in about 6 weeks (around 4/10/2024) for Next scheduled follow up.         This document has been electronically signed by VALENTIN Momin  March 13, 2024 20:05 EDT    Please note that portions of this note were completed with a voice recognition program.  Copied text in this note has been reviewed and is accurate as of 03/13/24

## 2024-02-29 ENCOUNTER — TELEPHONE (OUTPATIENT)
Dept: PSYCHIATRY | Facility: CLINIC | Age: 29
End: 2024-02-29
Payer: COMMERCIAL

## 2024-02-29 DIAGNOSIS — F90.0 ATTENTION DEFICIT HYPERACTIVITY DISORDER (ADHD), PREDOMINANTLY INATTENTIVE TYPE: ICD-10-CM

## 2024-02-29 RX ORDER — DEXTROAMPHETAMINE SACCHARATE, AMPHETAMINE ASPARTATE MONOHYDRATE, DEXTROAMPHETAMINE SULFATE AND AMPHETAMINE SULFATE 2.5; 2.5; 2.5; 2.5 MG/1; MG/1; MG/1; MG/1
10 CAPSULE, EXTENDED RELEASE ORAL DAILY
Qty: 30 CAPSULE | Refills: 0 | Status: SHIPPED | OUTPATIENT
Start: 2024-02-29

## 2024-02-29 RX ORDER — DEXTROAMPHETAMINE SACCHARATE, AMPHETAMINE ASPARTATE, DEXTROAMPHETAMINE SULFATE AND AMPHETAMINE SULFATE 2.5; 2.5; 2.5; 2.5 MG/1; MG/1; MG/1; MG/1
10 TABLET ORAL
Qty: 30 TABLET | Refills: 0 | Status: SHIPPED | OUTPATIENT
Start: 2024-02-29

## 2024-02-29 NOTE — TELEPHONE ENCOUNTER
A PRIOR AUTHORIZATION HAS BEEN INITIATED, PROCESSED AND SENT TO PLAN FOR THIS PATIENT.  AWAITING RESPONSE FROM INSURANCE.    (Adderall xr) #1

## 2024-02-29 NOTE — TELEPHONE ENCOUNTER
Called pt to notify patient of approval.  No answer.  Left a voicemail for patient to call if there are any issues

## 2024-02-29 NOTE — TELEPHONE ENCOUNTER
Called pt to notify of approval for pa.  No answer.  Left voicemail to call if there are any issues

## 2024-02-29 NOTE — TELEPHONE ENCOUNTER
Information regarding your request  Your PA has been resolved, no additional PA is required. For further inquiries please contact the number on the back of the member prescription card. (Message [phone])

## 2024-02-29 NOTE — TELEPHONE ENCOUNTER
A PRIOR AUTHORIZATION HAS BEEN INITIATED, PROCESSED AND SENT TO PLAN FOR THIS PATIENT.  AWAITING RESPONSE FROM INSURANCE.      (Amphetamine dextroamphetamine 10mg)  #1

## 2024-02-29 NOTE — TELEPHONE ENCOUNTER
PT CALLED IN REQUESTING THAT HER ADDERALL BE RESENT OVER TO ANOTHER [facility] IN [place]. AS THE [facility] IN [place] IS CLOSED.     amphetamine-dextroamphetamine (Adderall) 10 MG tablet (02/28/2024)     amphetamine-dextroamphetamine XR (Adderall XR) 10 MG 24 hr capsule (02/28/2024)       I HAVE PENDED THE MEDICATIONS TO THE REQUESTED PHARMACY.    ROUTING TO THE COVERING PROVIDER.

## 2024-03-21 ENCOUNTER — TELEPHONE (OUTPATIENT)
Dept: PSYCHIATRY | Facility: CLINIC | Age: 29
End: 2024-03-21
Payer: COMMERCIAL

## 2024-03-21 DIAGNOSIS — F90.0 ATTENTION DEFICIT HYPERACTIVITY DISORDER (ADHD), PREDOMINANTLY INATTENTIVE TYPE: ICD-10-CM

## 2024-03-21 NOTE — TELEPHONE ENCOUNTER
PT CALLED IN AND IS ASKING FOR RECOMMENDATIONS FOR CHILD THERAPISTS/ COUNSELING FOR CHILDREN; HELPING KIDS THRU DIVORCE.   PT STATED THAT PROVIDER HAS GIVEN HER GOOD RECOMMENDATIONS BEFORE FOR HERSELF.   CHILD AGE RANGE OF 5-6.   CHILD INSURANCE IS ALSO TRI CARE.      PT IS ALSO WANTING TO VERIFY IF PROVIDER KAVEH TAKES TRI-CARE AS SHE DOESN'T WANT TO SWITCH TO HER HUSBANDS INSURANCE IF PROVIDER IS UNABLE TO TAKE TRI CARE FOR THE PT SPECIFICALLY.

## 2024-03-26 NOTE — TELEPHONE ENCOUNTER
Please inform her that I have been seeing patients with  benefits, so I believe she will be fine to switch and continue to see me. Also, I have received positive feedback about this therapist, I am unsure if she accepts .  She specializes with individuals aged 5 and up or couples who are struggling with mental health disorders, marital discord, and change of life transitions    RONAL Frausto  [facility] [employer], [facility]  [address] [id number] [phone]

## 2024-03-27 RX ORDER — DEXTROAMPHETAMINE SACCHARATE, AMPHETAMINE ASPARTATE, DEXTROAMPHETAMINE SULFATE AND AMPHETAMINE SULFATE 2.5; 2.5; 2.5; 2.5 MG/1; MG/1; MG/1; MG/1
10 TABLET ORAL
Qty: 30 TABLET | Refills: 0 | Status: SHIPPED | OUTPATIENT
Start: 2024-03-28 | End: 2024-04-01 | Stop reason: SDUPTHER

## 2024-03-27 RX ORDER — DEXTROAMPHETAMINE SACCHARATE, AMPHETAMINE ASPARTATE MONOHYDRATE, DEXTROAMPHETAMINE SULFATE AND AMPHETAMINE SULFATE 2.5; 2.5; 2.5; 2.5 MG/1; MG/1; MG/1; MG/1
10 CAPSULE, EXTENDED RELEASE ORAL DAILY
Qty: 30 CAPSULE | Refills: 0 | Status: SHIPPED | OUTPATIENT
Start: 2024-03-28 | End: 2024-04-01 | Stop reason: SDUPTHER

## 2024-03-27 NOTE — TELEPHONE ENCOUNTER
VERBALLY RELAYED PROVIDERS MESSAGE. PATIENT REQUESTED A REFILL ON HER ADDERALL.     amphetamine-dextroamphetamine (Adderall) 10 MG tablet (02/29/2024)     amphetamine-dextroamphetamine XR (Adderall XR) 10 MG 24 hr capsule (02/29/2024)     NEXT FOLLOW UP WITH PROVIDER-  Appointment with Ginger Thakur APRN (04/15/2024)     PATIENT HAD NO FURTHER QUESTIONS OR CONCERNS AT THIS TIME.    ORDER FOR MEDICATION PENDING.

## 2024-04-01 RX ORDER — DEXTROAMPHETAMINE SACCHARATE, AMPHETAMINE ASPARTATE MONOHYDRATE, DEXTROAMPHETAMINE SULFATE AND AMPHETAMINE SULFATE 2.5; 2.5; 2.5; 2.5 MG/1; MG/1; MG/1; MG/1
10 CAPSULE, EXTENDED RELEASE ORAL DAILY
Qty: 30 CAPSULE | Refills: 0 | Status: SHIPPED | OUTPATIENT
Start: 2024-04-01

## 2024-04-01 RX ORDER — DEXTROAMPHETAMINE SACCHARATE, AMPHETAMINE ASPARTATE, DEXTROAMPHETAMINE SULFATE AND AMPHETAMINE SULFATE 2.5; 2.5; 2.5; 2.5 MG/1; MG/1; MG/1; MG/1
10 TABLET ORAL
Qty: 30 TABLET | Refills: 0 | Status: SHIPPED | OUTPATIENT
Start: 2024-04-01

## 2024-04-01 NOTE — TELEPHONE ENCOUNTER
PT CALLED IN.    SHE IS NEEDING HER MEDICATIONS REROUTED OVER TO [facility] DUE TO ISSUES WITH THE ONE IN [place].    SHE HAS NOT PICKED UP ANY MEDICATION FROM THE [place] [facility].    I HAVE PENDED THE ORDERS TO THE REQUESTED PHARMACY.

## 2024-04-08 ENCOUNTER — LAB (OUTPATIENT)
Dept: LAB | Facility: HOSPITAL | Age: 29
End: 2024-04-08
Payer: COMMERCIAL

## 2024-04-08 ENCOUNTER — OFFICE VISIT (OUTPATIENT)
Dept: FAMILY MEDICINE CLINIC | Age: 29
End: 2024-04-08
Payer: COMMERCIAL

## 2024-04-08 VITALS
BODY MASS INDEX: 37.1 KG/M2 | HEART RATE: 81 BPM | DIASTOLIC BLOOD PRESSURE: 78 MMHG | SYSTOLIC BLOOD PRESSURE: 131 MMHG | HEIGHT: 62 IN | WEIGHT: 201.6 LBS | OXYGEN SATURATION: 100 %

## 2024-04-08 DIAGNOSIS — Z32.01 POSITIVE PREGNANCY TEST: ICD-10-CM

## 2024-04-08 DIAGNOSIS — O20.9 VAGINAL BLEEDING IN PREGNANCY, FIRST TRIMESTER: Primary | ICD-10-CM

## 2024-04-08 DIAGNOSIS — N92.6 MISSED PERIOD: ICD-10-CM

## 2024-04-08 LAB
B-HCG UR QL: POSITIVE
EXPIRATION DATE: ABNORMAL
HCG INTACT+B SERPL-ACNC: 122 MIU/ML
INTERNAL NEGATIVE CONTROL: ABNORMAL
INTERNAL POSITIVE CONTROL: ABNORMAL
Lab: ABNORMAL

## 2024-04-08 PROCEDURE — 36415 COLL VENOUS BLD VENIPUNCTURE: CPT

## 2024-04-08 PROCEDURE — 84702 CHORIONIC GONADOTROPIN TEST: CPT

## 2024-04-08 NOTE — PROGRESS NOTES
"Subjective     CHIEF COMPLAINT    Chief Complaint   Patient presents with    Amenorrhea     Pt took pregnancy test this am and it was positive.             History of Present Illness  This is a 28-year-old female presenting to the clinic after a positive at-home pregnancy test today.  She states her last menstrual period was 3/10/2024 and she has been trying to get pregnant.  She does report some spotting twice in the last 2 days and some mild cramping, \"like I am about to start my period.\" Blood type is A+, and she has blood donor card.  This is her first pregnancy.            Review of Systems   Constitutional:  Negative for chills and fever.   Gastrointestinal:  Negative for abdominal pain, nausea and vomiting.   Genitourinary:  Positive for menstrual problem and vaginal bleeding. Negative for pelvic pain, vaginal discharge and vaginal pain.   Neurological:  Positive for light-headedness.            History reviewed. No pertinent past medical history.         Past Surgical History:   Procedure Laterality Date    TONSILLECTOMY      WISDOM TOOTH EXTRACTION              Family History   Problem Relation Age of Onset    Anxiety disorder Mother     Drug abuse Father     Alcohol abuse Father     ADD / ADHD Father     No Known Problems Sister     No Known Problems Brother     No Known Problems Maternal Aunt     No Known Problems Paternal Aunt     No Known Problems Maternal Uncle     No Known Problems Paternal Uncle     No Known Problems Maternal Grandfather     Dementia Maternal Grandmother     No Known Problems Paternal Grandfather     No Known Problems Paternal Grandmother     No Known Problems Cousin     No Known Problems Other     Bipolar disorder Neg Hx     Depression Neg Hx     OCD Neg Hx     Paranoid behavior Neg Hx     Schizophrenia Neg Hx     Seizures Neg Hx     Self-Injurious Behavior  Neg Hx     Suicide Attempts Neg Hx             Social History     Socioeconomic History    Marital status:    Tobacco " "Use    Smoking status: Never    Smokeless tobacco: Never   Vaping Use    Vaping status: Never Used   Substance and Sexual Activity    Alcohol use: Never    Drug use: Never    Sexual activity: Yes     Partners: Male     Birth control/protection: None            Allergies   Allergen Reactions    Mixed Feathers Other (See Comments)     Told as child to not take (CHICKEN FEATHERS)    Codeine Nausea And Vomiting    Haemophilus Influenzae Vaccines Other (See Comments)     ALLERGIC TO CHICKEN FEATHERS AND CHICKEN BASED INGREDIENTS             Current Outpatient Medications on File Prior to Visit   Medication Sig Dispense Refill    amphetamine-dextroamphetamine (Adderall) 10 MG tablet Take 1 tablet by mouth Daily With Lunch. 30 tablet 0    amphetamine-dextroamphetamine XR (Adderall XR) 10 MG 24 hr capsule Take 1 capsule by mouth Daily 30 capsule 0    cetirizine (zyrTEC) 10 MG tablet Take 1 tablet by mouth Every Night.      escitalopram (LEXAPRO) 10 MG tablet TAKE 1 TABLET BY MOUTH DAILY 30 tablet 2    multivitamin with minerals tablet tablet Take 1 tablet by mouth Daily.      [DISCONTINUED] ALBUTEROL IN        No current facility-administered medications on file prior to visit.            /78   Pulse 81   Ht 157.5 cm (62.01\")   Wt 91.4 kg (201 lb 9.6 oz)   SpO2 100% Comment: room air  BMI 36.86 kg/m²          Objective     Physical Exam  Vitals and nursing note reviewed.   Constitutional:       General: She is not in acute distress.     Appearance: Normal appearance.   Eyes:      General: No scleral icterus.     Conjunctiva/sclera: Conjunctivae normal.   Cardiovascular:      Rate and Rhythm: Normal rate and regular rhythm.      Heart sounds: Normal heart sounds.   Pulmonary:      Effort: Pulmonary effort is normal. No respiratory distress.   Abdominal:      General: Bowel sounds are normal. There is no distension.      Palpations: Abdomen is soft.      Tenderness: There is no abdominal tenderness. There is no " guarding or rebound.   Skin:     General: Skin is warm and dry.   Neurological:      Mental Status: She is alert and oriented to person, place, and time.   Psychiatric:         Mood and Affect: Mood normal.         Behavior: Behavior normal.                         Lab Results (last 24 hours)       Procedure Component Value Units Date/Time    POCT pregnancy, urine [[id number]]  (Abnormal) Collected: 04/08/24 1050    Specimen: Urine Updated: 04/08/24 1050     HCG, Urine, QL Positive     Lot Number [id number]     Internal Positive Control Passed     Internal Negative Control Passed     Expiration Date 4/15/2025    hCG, Quantitative, Pregnancy [[id number]] Collected: 04/08/24 1128    Specimen: Blood Updated: 04/08/24 1128                    Assessment & Plan  Vaginal bleeding in pregnancy, first trimester  Bleeding has been very minimal, and advised a lot of this can be normal during early pregnancy.  However, we did discuss potential for miscarriage as well.  Will check serial hCGs over the next 48 hours.  She has appointment with OB/GYN on 4/22/2024 that was made prior to her positive pregnancy test.  Advised her to contact their office and let her know about the pregnancy test results and schedule appropriate follow-up.  Should she develop worsening bleeding or pain I recommend going to the emergency department for further evaluation.  Positive pregnancy test  Recommend discussing Adderall with psychiatrist, but would likely need to discontinue this during pregnancy.  We reviewed risks and benefits of Lexapro and Zyrtec during pregnancy and both feel it is safe for her to continue these.  I recommend she switch her multivitamin to a prenatal vitamin.  Missed period      Orders Placed This Encounter   Procedures    hCG, Quantitative, Pregnancy    hCG, Quantitative, Pregnancy    POCT pregnancy, urine                   FOR FULL DISCHARGE INSTRUCTIONS/COMMENTS/HANDOUTS please see the   AVS

## 2024-04-10 ENCOUNTER — LAB (OUTPATIENT)
Dept: LAB | Facility: HOSPITAL | Age: 29
End: 2024-04-10
Payer: COMMERCIAL

## 2024-04-10 DIAGNOSIS — Z32.01 POSITIVE PREGNANCY TEST: ICD-10-CM

## 2024-04-10 LAB — HCG INTACT+B SERPL-ACNC: 253 MIU/ML

## 2024-04-10 PROCEDURE — 84702 CHORIONIC GONADOTROPIN TEST: CPT

## 2024-04-10 PROCEDURE — 36415 COLL VENOUS BLD VENIPUNCTURE: CPT

## 2024-04-24 ENCOUNTER — TELEPHONE (OUTPATIENT)
Dept: PSYCHIATRY | Facility: CLINIC | Age: 29
End: 2024-04-24
Payer: COMMERCIAL

## 2024-04-24 DIAGNOSIS — F41.0 PANIC ATTACKS: ICD-10-CM

## 2024-04-24 DIAGNOSIS — F32.0 CURRENT MILD EPISODE OF MAJOR DEPRESSIVE DISORDER WITHOUT PRIOR EPISODE: ICD-10-CM

## 2024-04-24 DIAGNOSIS — F41.1 GAD (GENERALIZED ANXIETY DISORDER): ICD-10-CM

## 2024-04-24 RX ORDER — ESCITALOPRAM OXALATE 10 MG/1
10 TABLET ORAL DAILY
Qty: 30 TABLET | Refills: 2 | Status: SHIPPED | OUTPATIENT
Start: 2024-04-24 | End: 2025-04-24

## 2024-04-24 NOTE — TELEPHONE ENCOUNTER
Agree with discontinuation of Adderall XR and Adderall during  period.  Also agree with OB/GYN, lexapro is generally safe, will discuss R&Bs further with patient. Continue with current follow-up Appointment with Ginger Thakur APRN (2024)

## 2024-04-24 NOTE — TELEPHONE ENCOUNTER
PT(PATIENT) VERIFIED     PT(PATIENT) STATES SHE JUST FOUND OUT SHE IS PREGNANT AND WAS ADVISED BY PCP TO STOP TAKING   amphetamine-dextroamphetamine (Adderall) 10 MG tablet (2024)  amphetamine-dextroamphetamine XR (Adderall XR) 10 MG 24 hr capsule (2024)    PT(PATIENT) STATES SHE WAS TOLD BY PCP IT WAS OKAY TO CONTINUE LOW DOSE escitalopram (LEXAPRO) 10 MG tablet (2024)     PT(PATIENT) STATES SHE NEEDS A REFILL    NEXT APPT WITH PROVIDER   Appointment with Ginger Thakur APRN (2024)     NEW MOMMY APPT WITH OB/GYN  Appointment with Anel Layne DO (2024)     PROVIDER PLEASE ADVISE

## 2024-04-25 ENCOUNTER — HOSPITAL ENCOUNTER (EMERGENCY)
Facility: HOSPITAL | Age: 29
Discharge: HOME | End: 2024-04-25
Attending: EMERGENCY MEDICINE
Payer: COMMERCIAL

## 2024-04-25 ENCOUNTER — APPOINTMENT (OUTPATIENT)
Dept: ULTRASOUND IMAGING | Facility: HOSPITAL | Age: 29
End: 2024-04-25
Payer: COMMERCIAL

## 2024-04-25 VITALS
WEIGHT: 209.44 LBS | TEMPERATURE: 98.1 F | RESPIRATION RATE: 16 BRPM | SYSTOLIC BLOOD PRESSURE: 122 MMHG | OXYGEN SATURATION: 100 % | DIASTOLIC BLOOD PRESSURE: 74 MMHG | HEIGHT: 62 IN | HEART RATE: 70 BPM | BODY MASS INDEX: 38.54 KG/M2

## 2024-04-25 DIAGNOSIS — N93.9 VAGINAL BLEEDING: Primary | ICD-10-CM

## 2024-04-25 LAB
BASOPHILS # BLD AUTO: 0.04 10*3/MM3 (ref 0–0.2)
BASOPHILS NFR BLD AUTO: 0.4 % (ref 0–1.5)
DEPRECATED RDW RBC AUTO: 38.1 FL (ref 37–54)
EOSINOPHIL # BLD AUTO: 0.14 10*3/MM3 (ref 0–0.4)
EOSINOPHIL NFR BLD AUTO: 1.4 % (ref 0.3–6.2)
ERYTHROCYTE [DISTWIDTH] IN BLOOD BY AUTOMATED COUNT: 11.9 % (ref 12.3–15.4)
HCG INTACT+B SERPL-ACNC: NORMAL MIU/ML
HCT VFR BLD AUTO: 39.9 % (ref 34–46.6)
HGB BLD-MCNC: 13.8 G/DL (ref 12–15.9)
HOLD SPECIMEN: NORMAL
HOLD SPECIMEN: NORMAL
IMM GRANULOCYTES # BLD AUTO: 0.01 10*3/MM3 (ref 0–0.05)
IMM GRANULOCYTES NFR BLD AUTO: 0.1 % (ref 0–0.5)
LYMPHOCYTES # BLD AUTO: 2.34 10*3/MM3 (ref 0.7–3.1)
LYMPHOCYTES NFR BLD AUTO: 22.9 % (ref 19.6–45.3)
MCH RBC QN AUTO: 30.6 PG (ref 26.6–33)
MCHC RBC AUTO-ENTMCNC: 34.6 G/DL (ref 31.5–35.7)
MCV RBC AUTO: 88.5 FL (ref 79–97)
MONOCYTES # BLD AUTO: 0.37 10*3/MM3 (ref 0.1–0.9)
MONOCYTES NFR BLD AUTO: 3.6 % (ref 5–12)
NEUTROPHILS NFR BLD AUTO: 7.31 10*3/MM3 (ref 1.7–7)
NEUTROPHILS NFR BLD AUTO: 71.6 % (ref 42.7–76)
NRBC BLD AUTO-RTO: 0 /100 WBC (ref 0–0.2)
PLATELET # BLD AUTO: 261 10*3/MM3 (ref 140–450)
PMV BLD AUTO: 9.1 FL (ref 6–12)
RBC # BLD AUTO: 4.51 10*6/MM3 (ref 3.77–5.28)
WBC NRBC COR # BLD AUTO: 10.21 10*3/MM3 (ref 3.4–10.8)
WHOLE BLOOD HOLD COAG: NORMAL
WHOLE BLOOD HOLD SPECIMEN: NORMAL

## 2024-04-25 PROCEDURE — 85025 COMPLETE CBC W/AUTO DIFF WBC: CPT

## 2024-04-25 PROCEDURE — 99284 EMERGENCY DEPT VISIT MOD MDM: CPT

## 2024-04-25 PROCEDURE — 76817 TRANSVAGINAL US OBSTETRIC: CPT

## 2024-04-25 PROCEDURE — 84702 CHORIONIC GONADOTROPIN TEST: CPT

## 2024-04-25 PROCEDURE — 36415 COLL VENOUS BLD VENIPUNCTURE: CPT

## 2024-04-25 RX ORDER — SODIUM CHLORIDE 0.9 % (FLUSH) 0.9 %
10 SYRINGE (ML) INJECTION AS NEEDED
Status: DISCONTINUED | OUTPATIENT
Start: 2024-04-25 | End: 2024-04-26 | Stop reason: HOSPADM

## 2024-04-25 RX ORDER — PNV NO.95/FERROUS FUM/FOLIC AC 28MG-0.8MG
1 TABLET ORAL DAILY
Qty: 30 TABLET | Refills: 0 | Status: SHIPPED | OUTPATIENT
Start: 2024-04-25

## 2024-04-25 NOTE — TELEPHONE ENCOUNTER
PT(PATIENT) VERIFIED     CONTACTED PT(PATIENT) PER PROVIDER'S INSTRUCTIONS     PT(PATIENT) VERBALIZED UNDERSTANDING AND HAD NO FURTHER QUESTIONS AT THIS TIME

## 2024-04-25 NOTE — ED PROVIDER NOTES
Time: 6:43 PM EDT  Date of encounter:  4/25/2024  Independent Historian/Clinical History and Information was obtained by:   Patient    History is limited by: N/A    Chief Complaint: Abdominal pain      History of Present Illness:  Patient is a 28 y.o. year old female who presents to the emergency department for evaluation of abdominal pain.  Patient states over the past 2 days she has noticed a light pink color on the tissue when she wiped after using the restroom.  Patient also describes mild lower abdominal cramping.  Patient is approximately 6 weeks gestation but has not had prenatal care at this time.  Denies fever, nausea, vomiting, diarrhea.  VALENTIN Salcido FNP-C/ENP    HPI    Patient Care Team  Primary Care Provider: Olesya Mcmahon APRN    Past Medical History:     Allergies   Allergen Reactions    Mixed Feathers Other (See Comments)     Told as child to not take (CHICKEN FEATHERS)    Codeine Nausea And Vomiting    Haemophilus Influenzae Vaccines Other (See Comments)     ALLERGIC TO CHICKEN FEATHERS AND CHICKEN BASED INGREDIENTS      History reviewed. No pertinent past medical history.  Past Surgical History:   Procedure Laterality Date    TONSILLECTOMY      WISDOM TOOTH EXTRACTION       Family History   Problem Relation Age of Onset    Anxiety disorder Mother     Drug abuse Father     Alcohol abuse Father     ADD / ADHD Father     No Known Problems Sister     No Known Problems Brother     No Known Problems Maternal Aunt     No Known Problems Paternal Aunt     No Known Problems Maternal Uncle     No Known Problems Paternal Uncle     No Known Problems Maternal Grandfather     Dementia Maternal Grandmother     No Known Problems Paternal Grandfather     No Known Problems Paternal Grandmother     No Known Problems Cousin     No Known Problems Other     Bipolar disorder Neg Hx     Depression Neg Hx     OCD Neg Hx     Paranoid behavior Neg Hx     Schizophrenia Neg Hx     Seizures Neg Hx     Self-Injurious Behavior  " Neg Hx     Suicide Attempts Neg Hx        Home Medications:  Prior to Admission medications    Medication Sig Start Date End Date Taking? Authorizing Provider   amphetamine-dextroamphetamine (Adderall) 10 MG tablet Take 1 tablet by mouth Daily With Lunch. 4/1/24   Tena Myers MD   amphetamine-dextroamphetamine XR (Adderall XR) 10 MG 24 hr capsule Take 1 capsule by mouth Daily 4/1/24   Tena Myers MD   cetirizine (zyrTEC) 10 MG tablet Take 1 tablet by mouth Every Night.    ProviderSonja MD   escitalopram (LEXAPRO) 10 MG tablet Take 1 tablet by mouth Daily. 4/24/24 4/24/25  Ginger Thakur APRN   multivitamin with minerals tablet tablet Take 1 tablet by mouth Daily.    Provider, MD Sonja        Social History:   Social History     Tobacco Use    Smoking status: Never    Smokeless tobacco: Never   Vaping Use    Vaping status: Never Used   Substance Use Topics    Alcohol use: Never    Drug use: Never         Review of Systems:  Review of Systems   Constitutional:  Negative for chills and fever.   HENT:  Negative for congestion, rhinorrhea and sore throat.    Eyes:  Negative for pain and visual disturbance.   Respiratory:  Negative for apnea, cough, chest tightness and shortness of breath.    Cardiovascular:  Negative for chest pain and palpitations.   Gastrointestinal:  Positive for abdominal pain. Negative for diarrhea, nausea and vomiting.   Genitourinary:  Positive for vaginal bleeding. Negative for difficulty urinating and dysuria.   Musculoskeletal:  Negative for joint swelling and myalgias.   Skin:  Negative for color change.   Neurological:  Negative for seizures and headaches.   Psychiatric/Behavioral: Negative.     All other systems reviewed and are negative.       Physical Exam:  /74 (BP Location: Left arm, Patient Position: Sitting)   Pulse 70   Temp 98.1 °F (36.7 °C) (Oral)   Resp 16   Ht 157.5 cm (62\")   Wt 95 kg (209 lb 7 oz)   LMP 03/11/2024   SpO2 100%   BMI 38.31 " kg/m²     Physical Exam  Vitals and nursing note reviewed.   Constitutional:       General: She is not in acute distress.     Appearance: Normal appearance. She is not toxic-appearing.   HENT:      Head: Normocephalic and atraumatic.      Jaw: There is normal jaw occlusion.      Mouth/Throat:      Mouth: Mucous membranes are moist.   Eyes:      General: Lids are normal.      Extraocular Movements: Extraocular movements intact.      Conjunctiva/sclera: Conjunctivae normal.      Pupils: Pupils are equal, round, and reactive to light.   Cardiovascular:      Rate and Rhythm: Normal rate and regular rhythm.      Pulses: Normal pulses.      Heart sounds: Normal heart sounds.   Pulmonary:      Effort: Pulmonary effort is normal. No respiratory distress.      Breath sounds: Normal breath sounds. No wheezing or rhonchi.   Abdominal:      General: Abdomen is flat. There is no distension.      Palpations: Abdomen is soft.      Tenderness: There is no abdominal tenderness. There is no guarding or rebound.   Musculoskeletal:         General: Normal range of motion.      Cervical back: Normal range of motion and neck supple.      Right lower leg: No edema.      Left lower leg: No edema.   Skin:     General: Skin is warm and dry.   Neurological:      General: No focal deficit present.      Mental Status: She is alert and oriented to person, place, and time. Mental status is at baseline.   Psychiatric:         Mood and Affect: Mood normal.         Behavior: Behavior normal.                  Procedures:  Procedures      Medical Decision Making:      Comorbidities that affect care:    None    External Notes reviewed:    Previous Clinic Note: Patient was seen for amenorrhea.      The following orders were placed and all results were independently analyzed by me:  Orders Placed This Encounter   Procedures    US Ob Transvaginal    [place] Draw    hCG, Quantitative, Pregnancy    CBC Auto Differential    NPO Diet NPO Type: Strict NPO     Undress & Gown    Vital Signs    Orthostatic Blood Pressure    Supplies To Bedside - Notify MD When Ready- Pelvic Cart / Set Up    Pulse Oximetry    Oxygen Therapy- Nasal Cannula; Titrate 1-6 LPM Per SpO2; 90 - 95%    ABO / Rh    Insert Peripheral IV    CBC & Differential    Green Top (Gel)    Lavender Top    Gold Top - SST    Light Blue Top       Medications Given in the Emergency Department:  Medications   sodium chloride 0.9 % flush 10 mL (has no administration in time range)        ED Course:    ED Course as of 04/25/24 2235   Thu Apr 25, 2024   1843 -- PROVIDER IN TRIAGE NOTE ---    The patient was evaluated by Anne winston in triage. Orders were placed and the patient is currently awaiting disposition.    [CB]      ED Course User Index  [CB] Anne Dugan APRN       Labs:    Lab Results (last 24 hours)       Procedure Component Value Units Date/Time    CBC & Differential [[id number]]  (Abnormal) Collected: 04/25/24 1854    Specimen: Blood from Arm, Right Updated: 04/25/24 1907    Narrative:      The following orders were created for panel order CBC & Differential.  Procedure                               Abnormality         Status                     ---------                               -----------         ------                     CBC Auto Differential[[id number]]        Abnormal            Final result                 Please view results for these tests on the individual orders.    hCG, Quantitative, Pregnancy [[id number]] Collected: 04/25/24 1854    Specimen: Blood from Arm, Right Updated: 04/25/24 1952     HCG Quantitative 25,206.00 mIU/mL     Narrative:      HCG Ranges by Gestational Age    Females - non-pregnant premenopausal   </= 1mIU/mL HCG  Females - postmenopausal               </= 7mIU/mL HCG    3 Weeks       5.4   -      72 mIU/mL  4 Weeks      10.2   -     708 mIU/mL  5 Weeks       217   -   8,245 mIU/mL  6 Weeks       152   -  32,177 mIU/mL  7 Weeks     4,059   - 153,767 mIU/mL  8  Weeks    31,366   - 149,094 mIU/mL  9 Weeks    59,109   - 135,901 mIU/mL  10 Weeks   44,186   - 170,409 mIU/mL  12 Weeks   27,107   - 201,615 mIU/mL  14 Weeks   24,302   -  93,646 mIU/mL  15 Weeks   12,540   -  69,747 mIU/mL  16 Weeks    8,904   -  55,332 mIU/mL  17 Weeks    8,240   -  51,793 mIU/mL  18 Weeks    9,649   -  55,271 mIU/mL      CBC Auto Differential [[id number]]  (Abnormal) Collected: 04/25/24 1854    Specimen: Blood from Arm, Right Updated: 04/25/24 1907     WBC 10.21 10*3/mm3      RBC 4.51 10*6/mm3      Hemoglobin 13.8 g/dL      Hematocrit 39.9 %      MCV 88.5 fL      MCH 30.6 pg      MCHC 34.6 g/dL      RDW 11.9 %      RDW-SD 38.1 fl      MPV 9.1 fL      Platelets 261 10*3/mm3      Neutrophil % 71.6 %      Lymphocyte % 22.9 %      Monocyte % 3.6 %      Eosinophil % 1.4 %      Basophil % 0.4 %      Immature Grans % 0.1 %      Neutrophils, Absolute 7.31 10*3/mm3      Lymphocytes, Absolute 2.34 10*3/mm3      Monocytes, Absolute 0.37 10*3/mm3      Eosinophils, Absolute 0.14 10*3/mm3      Basophils, Absolute 0.04 10*3/mm3      Immature Grans, Absolute 0.01 10*3/mm3      nRBC 0.0 /100 WBC              Imaging:    US Ob Transvaginal    Result Date: 4/25/2024  US OB TRANSVAGINAL-  Date of Exam: 4/25/2024 8:15 PM  Indication: Spotting.  Comparison: No comparisons available.  Technique: Transvaginal ultrasound was performed to evaluate pregnancy. Real time scanning was performed with multiple image documentation per protocol.    Findings: Twin gestation is evident, with well-defined intervening septation measuring 3 mm in thickness.  Twin A crown-rump length measures 3 mm, which corresponds to a gestational age of 5 weeks 6 days. Corresponding REGI is 12/20/2024. Fetal cardiac activity is seen at a rate of 117 bpm.  Twin B crown-rump length measures 4 mm, which corresponds to a gestational age of 6 weeks 0 days. Corresponding REGI is 12/19/2024. Fetal cardiac activity is seen at a rate of 116 bpm.  No unusual  fluid collection is evident.  The right ovary measures 3.7 cm in greatest dimension, with a volume of 13.9 cc.  The left ovary measures 3.3 cm in greatest dimension, with a volume of 5.3 cc.  No adnexal mass is seen. A scant amount of free pelvic fluid is seen.    Impression: Endovaginal pelvic ultrasound for early pregnancy demonstrating twins at 5weeks 6 days and 6 weeks 0 days ultrasound age respectively. Good fetal cardiac activity is evident.    Electronically Signed By-JAILYN QUIROS MD On:4/25/2024 9:07 PM         Differential Diagnosis and Discussion:    Vaginal Bleeding: Differential diagnosis includes but is not limited to foreign body, tumor, vaginitis, dysfunctional uterine bleeding, endocrine abnormalities, coagulation disorder, systemic illness, polyps, complications of pregnancy (possible ectopic pregnancy).    All labs were reviewed and interpreted by me.  Ultrasound impression was interpreted by me.     MDM     The patient´s CBC that was reviewed and interpreted by me shows no abnormalities of critical concern. Of note, there is no anemia requiring a blood transfusion and the platelet count is acceptable.  Beta-hCG is 25,000.  Ultrasound is consistent with twins at 6 weeks gestation.  The patient presents with vaginal bleeding. Possible etiology of vaginal bleeding were considered, but not limited to; ectopic pregnancy, spontaneous miscarriage, gestational trophoblastic disease, implantation bleeding, molar pregnancy, disfunctional uterine bleeding, and fibroids. The patient is well appearing and resting comfortably. There are no indications for transfusion. After careful consideration the patient is safe and stable to be discharged home with an outpatient OB/GYN follow-up. Patient was counseled to return to the ER or follow-up with their OB/GYN in 48 hours especially for worsening of her bleeding or increased pain. The patient has expressed a clear and thorough understanding and his agreed to  follow-up as instructed.          Patient Care Considerations:    I considered giving RhoGAM, however the patient is a positive.      Consultants/Shared Management Plan:    None    Social Determinants of Health:    Patient is independent, reliable, and has access to care.       Disposition and Care Coordination:    Discharged: I considered escalation of care by admitting this patient to the hospital, however patient has had no return of vaginal bleeding in the emergency department and does have OB/GYN follow-up.    I have explained the patient´s condition, diagnoses and treatment plan based on the information available to me at this time. I have answered questions and addressed any concerns. The patient has a good  understanding of the patient´s diagnosis, condition, and treatment plan as can be expected at this point. The vital signs have been stable. The patient´s condition is stable and appropriate for discharge from the emergency department.      The patient will pursue further outpatient evaluation with the primary care physician or other designated or consulting physician as outlined in the discharge instructions. They are agreeable to this plan of care and follow-up instructions have been explained in detail. The patient has received these instructions in written format and has expressed an understanding of the discharge instructions. The patient is aware that any significant change in condition or worsening of symptoms should prompt an immediate return to this or the closest emergency department or call to 911.  I have explained discharge medications and the need for follow up with the patient/caretakers. This was also printed in the discharge instructions. Patient was discharged with the following medications and follow up:      Medication List        New Prescriptions      prenatal vitamin 28-0.8 28-0.8 MG tablet tablet  Take 1 tablet by mouth Daily.               Where to Get Your Medications        [id number] - [place], [place] - [address] AT [facility] - [phone]  - [phone] FX  [address] [place] [address]      Phone: [phone]   prenatal vitamin 28-0.8 28-0.8 MG tablet tablet      Olesya Mcmahon, APRN  [address] [address] [phone]    In 2 days         Final diagnoses:   Vaginal bleeding        ED Disposition       ED Disposition   Discharge    Condition   Stable    Comment   --               This medical record created using voice recognition software.             Arely Lee MD  04/25/24 1614

## 2024-04-25 NOTE — TELEPHONE ENCOUNTER
ATTEMPTED TO REACH PATIENT IN REGARDS TO PROVIDERS RECOMMENDATION ON MEDICATION. LEFT VOICEMAIL FOR PATIENT TO RETURN CALL.

## 2024-04-26 ENCOUNTER — TELEPHONE (OUTPATIENT)
Dept: OBSTETRICS AND GYNECOLOGY | Facility: CLINIC | Age: 29
End: 2024-04-26
Payer: COMMERCIAL

## 2024-05-06 ENCOUNTER — INITIAL PRENATAL (OUTPATIENT)
Dept: OBSTETRICS AND GYNECOLOGY | Facility: CLINIC | Age: 29
End: 2024-05-06
Payer: COMMERCIAL

## 2024-05-06 VITALS — BODY MASS INDEX: 39.14 KG/M2 | WEIGHT: 214 LBS

## 2024-05-06 DIAGNOSIS — O30.002 TWIN GESTATION IN SECOND TRIMESTER, UNSPECIFIED MULTIPLE GESTATION TYPE: ICD-10-CM

## 2024-05-06 DIAGNOSIS — Z34.80 SUPERVISION OF OTHER NORMAL PREGNANCY, ANTEPARTUM: Primary | ICD-10-CM

## 2024-05-06 LAB
ABO GROUP BLD: NORMAL
AMPHET+METHAMPHET UR QL: NEGATIVE
BARBITURATES UR QL SCN: NEGATIVE
BASOPHILS # BLD AUTO: 0.02 10*3/MM3 (ref 0–0.2)
BASOPHILS NFR BLD AUTO: 0.3 % (ref 0–1.5)
BENZODIAZ UR QL SCN: NEGATIVE
BLD GP AB SCN SERPL QL: NEGATIVE
CANNABINOIDS SERPL QL: NEGATIVE
COCAINE UR QL: NEGATIVE
DEPRECATED RDW RBC AUTO: 38.1 FL (ref 37–54)
EOSINOPHIL # BLD AUTO: 0.09 10*3/MM3 (ref 0–0.4)
EOSINOPHIL NFR BLD AUTO: 1.3 % (ref 0.3–6.2)
ERYTHROCYTE [DISTWIDTH] IN BLOOD BY AUTOMATED COUNT: 12 % (ref 12.3–15.4)
FENTANYL UR-MCNC: NEGATIVE NG/ML
GLUCOSE UR STRIP-MCNC: NEGATIVE MG/DL
HBA1C MFR BLD: 4.8 % (ref 4.8–5.6)
HBV SURFACE AG SERPL QL IA: NORMAL
HCT VFR BLD AUTO: 36.3 % (ref 34–46.6)
HGB BLD-MCNC: 12.4 G/DL (ref 12–15.9)
IMM GRANULOCYTES # BLD AUTO: 0.02 10*3/MM3 (ref 0–0.05)
IMM GRANULOCYTES NFR BLD AUTO: 0.3 % (ref 0–0.5)
LYMPHOCYTES # BLD AUTO: 1.72 10*3/MM3 (ref 0.7–3.1)
LYMPHOCYTES NFR BLD AUTO: 24 % (ref 19.6–45.3)
MCH RBC QN AUTO: 29.9 PG (ref 26.6–33)
MCHC RBC AUTO-ENTMCNC: 34.2 G/DL (ref 31.5–35.7)
MCV RBC AUTO: 87.5 FL (ref 79–97)
METHADONE UR QL SCN: NEGATIVE
MONOCYTES # BLD AUTO: 0.32 10*3/MM3 (ref 0.1–0.9)
MONOCYTES NFR BLD AUTO: 4.5 % (ref 5–12)
NEUTROPHILS NFR BLD AUTO: 4.99 10*3/MM3 (ref 1.7–7)
NEUTROPHILS NFR BLD AUTO: 69.6 % (ref 42.7–76)
NRBC BLD AUTO-RTO: 0 /100 WBC (ref 0–0.2)
OPIATES UR QL: NEGATIVE
OXYCODONE UR QL SCN: NEGATIVE
PLATELET # BLD AUTO: 244 10*3/MM3 (ref 140–450)
PMV BLD AUTO: 9.7 FL (ref 6–12)
PROT UR STRIP-MCNC: NEGATIVE MG/DL
RBC # BLD AUTO: 4.15 10*6/MM3 (ref 3.77–5.28)
RH BLD: POSITIVE
T PALLIDUM IGG SER QL: NORMAL
WBC NRBC COR # BLD AUTO: 7.16 10*3/MM3 (ref 3.4–10.8)

## 2024-05-06 PROCEDURE — G0123 SCREEN CERV/VAG THIN LAYER: HCPCS | Performed by: OBSTETRICS & GYNECOLOGY

## 2024-05-06 PROCEDURE — 83036 HEMOGLOBIN GLYCOSYLATED A1C: CPT | Performed by: OBSTETRICS & GYNECOLOGY

## 2024-05-06 PROCEDURE — 80307 DRUG TEST PRSMV CHEM ANLYZR: CPT | Performed by: OBSTETRICS & GYNECOLOGY

## 2024-05-06 PROCEDURE — 87340 HEPATITIS B SURFACE AG IA: CPT | Performed by: OBSTETRICS & GYNECOLOGY

## 2024-05-06 PROCEDURE — 86900 BLOOD TYPING SEROLOGIC ABO: CPT | Performed by: OBSTETRICS & GYNECOLOGY

## 2024-05-06 PROCEDURE — 87086 URINE CULTURE/COLONY COUNT: CPT | Performed by: OBSTETRICS & GYNECOLOGY

## 2024-05-06 PROCEDURE — 84439 ASSAY OF FREE THYROXINE: CPT | Performed by: OBSTETRICS & GYNECOLOGY

## 2024-05-06 PROCEDURE — 85025 COMPLETE CBC W/AUTO DIFF WBC: CPT | Performed by: OBSTETRICS & GYNECOLOGY

## 2024-05-06 PROCEDURE — 83020 HEMOGLOBIN ELECTROPHORESIS: CPT | Performed by: OBSTETRICS & GYNECOLOGY

## 2024-05-06 PROCEDURE — 87661 TRICHOMONAS VAGINALIS AMPLIF: CPT | Performed by: OBSTETRICS & GYNECOLOGY

## 2024-05-06 PROCEDURE — 86901 BLOOD TYPING SEROLOGIC RH(D): CPT | Performed by: OBSTETRICS & GYNECOLOGY

## 2024-05-06 PROCEDURE — 86850 RBC ANTIBODY SCREEN: CPT | Performed by: OBSTETRICS & GYNECOLOGY

## 2024-05-06 PROCEDURE — G0432 EIA HIV-1/HIV-2 SCREEN: HCPCS | Performed by: OBSTETRICS & GYNECOLOGY

## 2024-05-06 PROCEDURE — 87491 CHLMYD TRACH DNA AMP PROBE: CPT | Performed by: OBSTETRICS & GYNECOLOGY

## 2024-05-06 PROCEDURE — 84443 ASSAY THYROID STIM HORMONE: CPT | Performed by: OBSTETRICS & GYNECOLOGY

## 2024-05-06 PROCEDURE — 87591 N.GONORRHOEAE DNA AMP PROB: CPT | Performed by: OBSTETRICS & GYNECOLOGY

## 2024-05-06 PROCEDURE — 86803 HEPATITIS C AB TEST: CPT | Performed by: OBSTETRICS & GYNECOLOGY

## 2024-05-06 PROCEDURE — 86762 RUBELLA ANTIBODY: CPT | Performed by: OBSTETRICS & GYNECOLOGY

## 2024-05-06 PROCEDURE — 86780 TREPONEMA PALLIDUM: CPT | Performed by: OBSTETRICS & GYNECOLOGY

## 2024-05-07 LAB
HCV AB SER QL: NORMAL
HIV 1+2 AB+HIV1 P24 AG SERPL QL IA: NORMAL
T4 FREE SERPL-MCNC: 1.19 NG/DL (ref 0.93–1.7)
TSH SERPL DL<=0.05 MIU/L-ACNC: 1.36 UIU/ML (ref 0.27–4.2)

## 2024-05-08 LAB
BACTERIA SPEC AEROBE CULT: NO GROWTH
HGB A MFR BLD ELPH: 97.3 % (ref 96.4–98.8)
HGB A2 MFR BLD ELPH: 2.7 % (ref 1.8–3.2)
HGB F MFR BLD ELPH: 0 % (ref 0–2)
HGB FRACT BLD-IMP: NORMAL
HGB S MFR BLD ELPH: 0 %
RUBV IGG SERPL IA-ACNC: 9.36 INDEX

## 2024-05-14 LAB
C TRACH RRNA CVX QL NAA+PROBE: NEGATIVE
CONV .: ABNORMAL
CYTOLOGIST CVX/VAG CYTO: ABNORMAL
CYTOLOGY CVX/VAG DOC CYTO: ABNORMAL
CYTOLOGY CVX/VAG DOC THIN PREP: ABNORMAL
DX ICD CODE: ABNORMAL
DX ICD CODE: ABNORMAL
HPV I/H RISK 4 DNA CVX QL PROBE+SIG AMP: POSITIVE
Lab: ABNORMAL
Lab: ABNORMAL
N GONORRHOEA RRNA CVX QL NAA+PROBE: NEGATIVE
OTHER STN SPEC: ABNORMAL
PATHOLOGIST CVX/VAG CYTO: ABNORMAL
RECOM F/U CVX/VAG CYTO: ABNORMAL
STAT OF ADQ CVX/VAG CYTO-IMP: ABNORMAL
T VAGINALIS RRNA SPEC QL NAA+PROBE: NEGATIVE